# Patient Record
Sex: FEMALE | Race: WHITE | Employment: OTHER | URBAN - METROPOLITAN AREA
[De-identification: names, ages, dates, MRNs, and addresses within clinical notes are randomized per-mention and may not be internally consistent; named-entity substitution may affect disease eponyms.]

---

## 2017-01-06 ENCOUNTER — TELEPHONE (OUTPATIENT)
Dept: CASE MANAGEMENT | Age: 77
End: 2017-01-06

## 2017-01-06 NOTE — TELEPHONE ENCOUNTER
Heart Failure Care Coordinator contacted the patient by telephone to perform CHF bundle Follow Up. Spoke to Diaz Tidwell  regarding the following:  Have you been to an ER/Hospital since we last spoke: Denies    Mr. Beverly Almaraz stated Mrs. Beverly Almaraz was feeling under the weather and did not feel like participating in the call. Mr Beverly Almaraz stated they do have a PCP apt. Scheduled for Tuesday 1/10/17. Mr. Beverly Almaraz stated Mrs. Loera Citijocelynn weight is down from last week although he cannot remember the exact weight. CHF coordinator thanked Mr. Beverly Almaraz for his time and cut call short. Mr. Beverly Almaraz stated coordinator could call back next week after PCP apt. and follow up. Patient reminded that the physicians are on call 24 hours a day / 7 days a week should the patient have questions or concerns.  Patient reminded to call 911 if situation is emergent or patient feels the situation is emergent.     No Medications Reconciled at this time    Contact Information:    [address] [facility] [place] Coordinator  O [phone] [email or web address]

## 2017-03-07 ENCOUNTER — HOSPITAL ENCOUNTER (OUTPATIENT)
Dept: CT IMAGING | Age: 77
Discharge: HOME | End: 2017-03-07
Attending: SPECIALIST
Payer: MEDICARE

## 2017-03-07 DIAGNOSIS — H60.12 CELLULITIS OF LEFT EXTERNAL EAR: ICD-10-CM

## 2017-03-07 DIAGNOSIS — J38.01 PARALYSIS OF VOCAL CORDS AND LARYNX, UNILATERAL: ICD-10-CM

## 2017-03-07 DIAGNOSIS — H90.3 SENSORINEURAL HEARING LOSS, BILATERAL: ICD-10-CM

## 2017-03-07 PROCEDURE — 74011636320 HC RX REV CODE- 636/320: Performed by: SPECIALIST

## 2017-03-07 PROCEDURE — 74011250636 HC RX REV CODE- 250/636: Performed by: SPECIALIST

## 2017-03-07 PROCEDURE — 70491 CT SOFT TISSUE NECK W/DYE: CPT

## 2017-03-07 RX ORDER — SODIUM CHLORIDE 0.9 % (FLUSH) 0.9 %
10 SYRINGE (ML) INJECTION
Status: COMPLETED | OUTPATIENT
Start: 2017-03-07 | End: 2017-03-07

## 2017-03-07 RX ORDER — SODIUM CHLORIDE 9 MG/ML
50 INJECTION, SOLUTION INTRAVENOUS
Status: COMPLETED | OUTPATIENT
Start: 2017-03-07 | End: 2017-03-07

## 2017-03-07 RX ADMIN — IOPAMIDOL 100 ML: 612 INJECTION, SOLUTION INTRAVENOUS at 15:05

## 2017-03-07 RX ADMIN — Medication 10 ML: at 15:06

## 2017-03-07 RX ADMIN — SODIUM CHLORIDE 125 ML/HR: 900 INJECTION, SOLUTION INTRAVENOUS at 13:50

## 2017-10-02 ENCOUNTER — HOSPITAL ENCOUNTER (EMERGENCY)
Age: 77
Discharge: HOME | End: 2017-10-03
Attending: EMERGENCY MEDICINE
Payer: MEDICARE

## 2017-10-02 ENCOUNTER — APPOINTMENT (OUTPATIENT)
Dept: GENERAL RADIOLOGY | Age: 77
End: 2017-10-02
Attending: EMERGENCY MEDICINE
Payer: MEDICARE

## 2017-10-02 DIAGNOSIS — I50.33 ACUTE ON CHRONIC DIASTOLIC CONGESTIVE HEART FAILURE (HCC): Primary | ICD-10-CM

## 2017-10-02 DIAGNOSIS — Z91.14 NONCOMPLIANCE WITH MEDICATION REGIMEN: ICD-10-CM

## 2017-10-02 LAB
ALBUMIN SERPL-MCNC: 3.5 G/DL (ref 3.5–5)
ALBUMIN/GLOB SERPL: 0.9 {RATIO} (ref 1.1–2.2)
ALP SERPL-CCNC: 175 U/L (ref 45–117)
ALT SERPL-CCNC: 14 U/L (ref 12–78)
ANION GAP SERPL CALC-SCNC: 4 MMOL/L (ref 5–15)
AST SERPL-CCNC: 15 U/L (ref 15–37)
BASOPHILS # BLD: 0.1 K/UL (ref 0–0.1)
BASOPHILS NFR BLD: 1 % (ref 0–1)
BILIRUB SERPL-MCNC: 0.7 MG/DL (ref 0.2–1)
BNP SERPL-MCNC: 6180 PG/ML (ref 0–450)
BUN SERPL-MCNC: 16 MG/DL (ref 6–20)
BUN/CREAT SERPL: 12 (ref 12–20)
CALCIUM SERPL-MCNC: 8.8 MG/DL (ref 8.5–10.1)
CHLORIDE SERPL-SCNC: 103 MMOL/L (ref 97–108)
CK MB CFR SERPL CALC: 2.6 % (ref 0–2.5)
CK MB SERPL-MCNC: 1.2 NG/ML (ref 5–25)
CK SERPL-CCNC: 47 U/L (ref 26–192)
CO2 SERPL-SCNC: 33 MMOL/L (ref 21–32)
CREAT SERPL-MCNC: 1.36 MG/DL (ref 0.55–1.02)
EOSINOPHIL # BLD: 0.4 K/UL (ref 0–0.4)
EOSINOPHIL NFR BLD: 5 % (ref 0–7)
ERYTHROCYTE [DISTWIDTH] IN BLOOD BY AUTOMATED COUNT: 15.9 % (ref 11.5–14.5)
GLOBULIN SER CALC-MCNC: 4.1 G/DL (ref 2–4)
GLUCOSE SERPL-MCNC: 130 MG/DL (ref 65–100)
HCT VFR BLD AUTO: 42.9 % (ref 35–47)
HGB BLD-MCNC: 13 G/DL (ref 11.5–16)
INR PPP: 2.9 (ref 0.9–1.1)
LYMPHOCYTES # BLD: 1.3 K/UL (ref 0.8–3.5)
LYMPHOCYTES NFR BLD: 15 % (ref 12–49)
MCH RBC QN AUTO: 28.6 PG (ref 26–34)
MCHC RBC AUTO-ENTMCNC: 30.3 G/DL (ref 30–36.5)
MCV RBC AUTO: 94.3 FL (ref 80–99)
MONOCYTES # BLD: 0.8 K/UL (ref 0–1)
MONOCYTES NFR BLD: 10 % (ref 5–13)
NEUTS SEG # BLD: 5.8 K/UL (ref 1.8–8)
NEUTS SEG NFR BLD: 69 % (ref 32–75)
PLATELET # BLD AUTO: 188 K/UL (ref 150–400)
POTASSIUM SERPL-SCNC: 5.2 MMOL/L (ref 3.5–5.1)
PROT SERPL-MCNC: 7.6 G/DL (ref 6.4–8.2)
PROTHROMBIN TIME: 30 SEC (ref 9–11.1)
RBC # BLD AUTO: 4.55 M/UL (ref 3.8–5.2)
SODIUM SERPL-SCNC: 140 MMOL/L (ref 136–145)
TROPONIN I SERPL-MCNC: <0.04 NG/ML
WBC # BLD AUTO: 8.3 K/UL (ref 3.6–11)

## 2017-10-02 PROCEDURE — 93005 ELECTROCARDIOGRAM TRACING: CPT

## 2017-10-02 PROCEDURE — 99285 EMERGENCY DEPT VISIT HI MDM: CPT

## 2017-10-02 PROCEDURE — 71010 XR CHEST PORT: CPT

## 2017-10-02 PROCEDURE — 94640 AIRWAY INHALATION TREATMENT: CPT

## 2017-10-02 PROCEDURE — 74011250636 HC RX REV CODE- 250/636: Performed by: EMERGENCY MEDICINE

## 2017-10-02 PROCEDURE — 85610 PROTHROMBIN TIME: CPT | Performed by: EMERGENCY MEDICINE

## 2017-10-02 PROCEDURE — 84484 ASSAY OF TROPONIN QUANT: CPT | Performed by: EMERGENCY MEDICINE

## 2017-10-02 PROCEDURE — 36415 COLL VENOUS BLD VENIPUNCTURE: CPT | Performed by: EMERGENCY MEDICINE

## 2017-10-02 PROCEDURE — 96374 THER/PROPH/DIAG INJ IV PUSH: CPT

## 2017-10-02 PROCEDURE — 82550 ASSAY OF CK (CPK): CPT | Performed by: EMERGENCY MEDICINE

## 2017-10-02 PROCEDURE — 80053 COMPREHEN METABOLIC PANEL: CPT | Performed by: EMERGENCY MEDICINE

## 2017-10-02 PROCEDURE — 77030029684 HC NEB SM VOL KT MONA -A

## 2017-10-02 PROCEDURE — 74011000250 HC RX REV CODE- 250: Performed by: EMERGENCY MEDICINE

## 2017-10-02 PROCEDURE — 83880 ASSAY OF NATRIURETIC PEPTIDE: CPT | Performed by: EMERGENCY MEDICINE

## 2017-10-02 PROCEDURE — 85025 COMPLETE CBC W/AUTO DIFF WBC: CPT | Performed by: EMERGENCY MEDICINE

## 2017-10-02 RX ORDER — FUROSEMIDE 10 MG/ML
40 INJECTION INTRAMUSCULAR; INTRAVENOUS
Status: COMPLETED | OUTPATIENT
Start: 2017-10-02 | End: 2017-10-02

## 2017-10-02 RX ORDER — IPRATROPIUM BROMIDE AND ALBUTEROL SULFATE 2.5; .5 MG/3ML; MG/3ML
3 SOLUTION RESPIRATORY (INHALATION) ONCE
Status: COMPLETED | OUTPATIENT
Start: 2017-10-02 | End: 2017-10-02

## 2017-10-02 RX ADMIN — IPRATROPIUM BROMIDE AND ALBUTEROL SULFATE 3 ML: .5; 3 SOLUTION RESPIRATORY (INHALATION) at 20:15

## 2017-10-02 RX ADMIN — FUROSEMIDE 40 MG: 10 INJECTION, SOLUTION INTRAMUSCULAR; INTRAVENOUS at 21:15

## 2017-10-02 NOTE — ED TRIAGE NOTES
Pt has a history of CHF, a fib, HTN, and kidney disease. Pt reports that she slid out of the bed because she could not stand. Pt on the floor for an unknown length of time. Pt had wheezing per EMS from across the room. Pt is non-ambulatory. Pt can stand with assistance. Pt lives at home.  should be on the way. Pt has lymphademy in her legs. 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 Pt is a DNR, per EMS. Pt getting albuterol/atrovent breathing treatment. Pt does not have home oxygen.

## 2017-10-02 NOTE — ED NOTES
Attempted to help clean pt up. Some feces removed from pt's right side. Limited visibility to note any skin breakdown. Pt unable to roll very far.

## 2017-10-02 NOTE — ED NOTES
Per EMS, they tried to roll the patient over to get a sheet under her to help her up and they were unable to.

## 2017-10-03 VITALS
SYSTOLIC BLOOD PRESSURE: 110 MMHG | OXYGEN SATURATION: 98 % | HEART RATE: 109 BPM | DIASTOLIC BLOOD PRESSURE: 65 MMHG | HEIGHT: 70 IN | RESPIRATION RATE: 17 BRPM | WEIGHT: 293 LBS | BODY MASS INDEX: 41.95 KG/M2 | TEMPERATURE: 97.9 F

## 2017-10-03 LAB
ATRIAL RATE: 150 BPM
CALCULATED R AXIS, ECG10: -118 DEGREES
CALCULATED T AXIS, ECG11: 84 DEGREES
DIAGNOSIS, 93000: NORMAL
Q-T INTERVAL, ECG07: 348 MS
QRS DURATION, ECG06: 90 MS
QTC CALCULATION (BEZET), ECG08: 464 MS
VENTRICULAR RATE, ECG03: 107 BPM

## 2017-10-03 NOTE — ED NOTES
Bedside and Verbal shift change report given to JAROD/Elliot Curry 1106 (oncoming nurse) by Jaskaran Edge (offgoing nurse). Report included the following information SBAR, Kardex, ED Summary, [facility] and Recent Results. Assuming care of pt.

## 2017-10-03 NOTE — PROGRESS NOTES
CM Consult for discharge planning:    CM saw patient and her  in ED to discuss discharge needs and planning. 69 y.o. female with PMhx significant for CHF, CKD, arthritis, HTN, and lymphedema who presents via EMS to the ED with cc of gradually worsening SOB and a cough. Per pt, she presents to the ED today s/p a fall and evaluation for it alongside the aforementioned. Per pt, she was moving from her wheelchair to the \"potty chair\" when she slipped down onto the floor, urinating on, and defecating herself. Patient has had increased difficulty with ambulation and pivoting to [facility] over last couple of months and has become more and more immobile. Patient last saw PCP in August and cannot remember last appointment with cardiology or nephrology. She states she has not been taking her \"fluid pills\" because she has had so much trouble going to the bathroom. Patient was provided with options for increased personal care and Dr. Regan De La O has agreed with order for home health for OT/PT safety evaluation and nursing for congestive heart failure management. Patient has been offered [place] of choice and has chosen [facility]. Referral sent via [employer]. CM spoke to Dr. Nitza Krueger nurse (no nurse navigator in office) regarding patient's plan of care hand-off. Patient will need hospital bed and bariatric lift for assistance with home management. Patient's  provided with list of home health providers, SNF's, personal care private pay providers, [employer], caregiver websites for assistance with finding AL facility if needed. Patient and  will also plan to apply for Medicaid and/or review fiances for caregiver options. Palliative Care information provided for discussion with Dr. Regan De La O. Care Management Interventions  PCP Verified by CM: Yes (Dr. Lizz Lindsey)  Mode of Transport at Discharge:  Other (see comment) (Patient has motorized wheelchair, w/c van and ramp at home -  and patient  declined EMS stretcher transport)  Transition of Care Consult (CM Consult): Discharge Planning (List of resources provided to patient and her  - see notes)  Brot Signup: No  Discharge Durable Medical Equipment: No (Patient has motorized w/c, BSC, rollator and home ramp)  Physical Therapy Consult: No  Occupational Therapy Consult: No  Speech Therapy Consult: No  Current Support Network: Lives with Spouse, Own Home (One story home w/ 2 steps(ramp) to enter.   2 children live close by but may be unreliable w/ help)  Confirm Follow Up Transport: Wheelchair Joana Ban (Family has w/c Norma )  Plan discussed with Pt/Family/Caregiver: Yes (Patient and  discussed options)  Freedom of Choice Offered: Yes (Patient refused self-pay placement and requests to return home)  Discharge Location  Discharge Placement: Home with family assistance (Requested home health order from PCP - he will consider home health after office visit on Friday, 10/6.)    Curtis Almazan RN, BSN, ACM  ED Case Manager  [phone]

## 2017-10-03 NOTE — ED NOTES
Bedside shift change report given to Ignacia Alarcon (oncoming nurse) by John Salter (offgoing nurse). Report included the following information SBAR, ED Summary, MAR and Recent Results.

## 2017-10-03 NOTE — ED NOTES
Per  Sarina Ha RN pt became hypoxic while conversing and dropped to 77% on room air. Pt was placed on 2L NC and SpO2 came up to 92%. Pt taken off O2 again and remained at 89-90% while resting.

## 2017-10-03 NOTE — DISCHARGE INSTRUCTIONS
Heart Failure: Care Instructions  Your Care Instructions    Heart failure occurs when your heart does not pump as much blood as the body needs. Failure does not mean that the heart has stopped pumping but rather that it is not pumping as well as it should. Over time, this causes fluid buildup in your lungs and other parts of your body. Fluid buildup can cause shortness of breath, fatigue, swollen ankles, and other problems. By taking medicines regularly, reducing sodium (salt) in your diet, checking your weight every day, and making lifestyle changes, you can feel better and live longer. Follow-up care is a key part of your treatment and safety. Be sure to make and go to all appointments, and call your doctor if you are having problems. It's also a good idea to know your test results and keep a list of the medicines you take. How can you care for yourself at home? Medicines  · Be safe with medicines. Take your medicines exactly as prescribed. Call your doctor if you think you are having a problem with your medicine. · Do not take any vitamins, over-the-counter medicine, or herbal products without talking to your doctor first. Marie Meiers not take ibuprofen (Advil or Motrin) and naproxen (Aleve) without talking to your doctor first. They could make your heart failure worse. · You may be taking some of the following medicine. ¨ Beta-blockers can slow heart rate, decrease blood pressure, and improve your condition. Taking a beta-blocker may lower your chance of needing to be hospitalized. ¨ Angiotensin-converting enzyme inhibitors (ACEIs) reduce the heart's workload, lower blood pressure, and reduce swelling. Taking an ACEI may lower your chance of needing to be hospitalized again. ¨ Angiotensin II receptor blockers (ARBs) work like ACEIs. Your doctor may prescribe them instead of ACEIs. ¨ Diuretics, also called water pills, reduce swelling.   ¨ Potassium supplements replace this important mineral, which is sometimes lost with diuretics. ¨ Aspirin and other blood thinners prevent blood clots, which can cause a stroke or heart attack. You will get more details on the specific medicines your doctor prescribes. Diet  · Your doctor may suggest that you limit sodium to 2,000 milligrams (mg) a day or less. That is less than 1 teaspoon of salt a day, including all the salt you eat in cooking or in packaged foods. People get most of their sodium from processed foods. Fast food and restaurant meals also tend to be very high in sodium. · Ask your doctor how much liquid you can drink each day. You may have to limit liquids. Weight  · Weigh yourself without clothing at the same time each day. Record your weight. Call your doctor if you have a sudden weight gain, such as more than 2 to 3 pounds in a day or 5 pounds in a week. (Your doctor may suggest a different range of weight gain.) A sudden weight gain may mean that your heart failure is getting worse. Activity level  · Start light exercise (if your doctor says it is okay). Even if you can only do a small amount, exercise will help you get stronger, have more energy, and manage your weight and your stress. Walking is an easy way to get exercise. Start out by walking a little more than you did before. Bit by bit, increase the amount you walk. · When you exercise, watch for signs that your heart is working too hard. You are pushing yourself too hard if you cannot talk while you are exercising. If you become short of breath or dizzy or have chest pain, stop, sit down, and rest.  · If you feel \"wiped out\" the day after you exercise, walk slower or for a shorter distance until you can work up to a better pace. · Get enough rest at night. Sleeping with 1 or 2 pillows under your upper body and head may help you breathe easier. Lifestyle changes  · Do not smoke. Smoking can make a heart condition worse.  If you need help quitting, talk to your doctor about stop-smoking programs and medicines. These can increase your chances of quitting for good. Quitting smoking may be the most important step you can take to protect your heart. · Limit alcohol to 2 drinks a day for men and 1 drink a day for women. Too much alcohol can cause health problems. · Avoid getting sick from colds and the flu. Get a pneumococcal vaccine shot. If you have had one before, ask your doctor whether you need another dose. Get a flu shot each year. If you must be around people with colds or the flu, wash your hands often. When should you call for help? Call 911 if you have symptoms of sudden heart failure such as:  · You have severe trouble breathing. · You cough up pink, foamy mucus. · You have a new irregular or rapid heartbeat. Call your doctor now or seek immediate medical care if:  · You have new or increased shortness of breath. · You are dizzy or lightheaded, or you feel like you may faint. · You have sudden weight gain, such as more than 2 to 3 pounds in a day or 5 pounds in a week. (Your doctor may suggest a different range of weight gain.)  · You have increased swelling in your legs, ankles, or feet. · You are suddenly so tired or weak that you cannot do your usual activities. Watch closely for changes in your health, and be sure to contact your doctor if:  · You develop new symptoms. Where can you learn more? Go to http://ke-tawanna.info/. Enter W542 in the search box to learn more about \"Heart Failure: Care Instructions. \"  Current as of: April 3, 2017  Content Version: 11.3  © 4304-4066 [employer] Care instructions adapted under license by [employer] (which disclaims liability or warranty for this information). If you have questions about a medical condition or this instruction, always ask your healthcare professional. [address] any warranty or liability for your use of this information.

## 2017-10-03 NOTE — ED PROVIDER NOTES
[address]  EMERGENCY DEPARTMENT HISTORY AND PHYSICAL EXAM       Date of Service: 10/2/2017   Patient Name: Juan Soriano   YOB: 1940  Medical Record Number: [id number]    History of Presenting Illness     Chief Complaint   Patient presents with    Shortness of Breath      History Provided By:  patient    Additional History:   Juan Soriano is a 69 y.o. female with PMhx significant for CHF, CKD, arthritis, HTN, and lymphedema who presents via EMS to the ED with cc of gradually worsening SOB and a cough. Per pt, she presents to the ED today s/p a fall and evaluation for it alongside the aforementioned. Per pt, she was moving from her wheelchair to the \"potty chair\" when she slipped down onto the floor, urinating on, and defecating herself. Pt notes EMS arrived for assistance and they noted her SOB to be worse with active wheezing. Pt states her SOB has been present over the past three weeks with gradual worsening. Pt states they attempted to treat her SOB with her 's NC at home at 2L with mild relief. Pt notes an intermittent dry cough and wheezing with her SOB which have been increased from baseline. Of note, pt informs she lives a sedentary lifestyle and does not ambulate. Pt states she is supposed to be on 6 Lasix pills daily but has completely stopped taking them x three weeks secondary to having difficulty moving to urinate every several minutes. Pt informs she is on Coumadin. Pt specifically denies any nausea, vomiting, fevers, chills, abdominal pain, or chest pain. Social Hx: -  Tobacco, -  EtOH, -  Illicit Drugs    There are no other complaints, changes or physical findings at this time.     Primary Care Provider: Eva Jackson MD     Past History     Past Medical History:   Past Medical History:   Diagnosis Date    Arrhythmia     A Fib    Arthritis     Atrial fibrillation ([facility]     Cancer ([facility]     left breast cancer    Cardiomegaly     CKD (chronic kidney disease) stage 3, GFR 30-59 ml/min     judith    Clostridium difficile infection     Congestive [facility] failure, unspecified [facility]     COPD     Diarrhea     /constipation    Dyspepsia and other specified disorders of function of stomach     Elevated antinuclear antibody (MARYAM) level     Endocrine disease     hypothyroidism    GERD (gastroesophageal reflux disease)     Hearing loss     Heart failure (HCC)     Hypertension     Hypertensive heart disease with heart failure (HCC)     diastolic dysfunction    Joint pain     Leg swelling     Long term (current) use of anticoagulants     Lymphedema     Morbid obesity (HCC)     Obesity, unspecified     Other ill-defined conditions     lymph edema    Pleural effusion     s/p VATS    Shortness of breath     Thyroid disease     Unspecified sleep apnea     Does not use machine      Past Surgical History:   Past Surgical History:   Procedure Laterality Date    BREAST SURGERY PROCEDURE UNLISTED  13    LEFT BREAST MASTECTOMY SENTINEL NODE BIOPSY      HX BREAST BIOPSY      left    HX BREAST BIOPSY      fibroids removed right breast    HX  SECTION      times 2    HX KNEE ARTHROSCOPY      left knee     HX MODIFIED RADICAL MASTECTOMY  2013    BREAST MASTECTOMY MODIFIED RADICAL performed by Jaquelin Katz MD at [facility] MAIN OR    HX OTHER SURGICAL      pleural effusion    HX RHINOPLASTY      HX DOMI AND BSO      HX TONSILLECTOMY        Family History:   Family History   Problem Relation Age of Onset    Lung Disease Mother      Emphysema    Heart Disease Mother     Hypertension Mother     Cancer Father      Lung Cancer    Heart Disease Father     Hypertension Father     Cancer Paternal Aunt      colon      Social History:   Social History   Substance Use Topics    Smoking status: Never Smoker    Smokeless tobacco: Never Used    Alcohol use No      Allergies:    Allergies   Allergen Reactions    Latex Rash    Grass Pollen-[place], Standard Unknown (comments)    Sulfa (Sulfonamide Antibiotics) Unknown (comments)       Review of Systems   Review of Systems   Constitutional: Negative for chills, fatigue and fever. Eyes: Negative. Respiratory: Positive for cough, shortness of breath and wheezing. Cardiovascular: Negative for chest pain and leg swelling. Gastrointestinal: Negative for abdominal pain, blood in stool, constipation, diarrhea, nausea and vomiting. Endocrine: Negative. Genitourinary: Negative for difficulty urinating and dysuria. Musculoskeletal: Negative. Skin: Negative for rash. Allergic/Immunologic: Negative. Neurological: Negative for weakness and numbness. Hematological: Negative. Psychiatric/Behavioral: Negative. Physical Exam  Physical Exam   Constitutional: She is oriented to person, place, and time. She appears well-developed and well-nourished. No distress. Hoarse voice   HENT:   Head: Normocephalic and atraumatic. Mouth/Throat: Oropharynx is clear and moist.   Eyes: Conjunctivae and EOM are normal.   Neck: Neck supple. No JVD present. No tracheal deviation present. Cardiovascular: Intact distal pulses. An irregularly irregular rhythm present. Tachycardia present. Exam reveals no gallop and no friction rub. No murmur heard. Pulmonary/Chest: Effort normal. No stridor. No respiratory distress. She has wheezes (diffuse, BL, expiratory). Abdominal: Soft. Bowel sounds are normal. She exhibits no distension and no mass. There is no tenderness. There is no guarding. Musculoskeletal: Normal range of motion. She exhibits no tenderness. No deformity; 3+ pitting edema   Neurological: She is alert and oriented to person, place, and time. She has normal strength. No focal deficits   Skin: Skin is warm, dry and intact. No rash noted. Chronic, hardened skin changes to BLE   Psychiatric: She has a normal mood and affect.  Her behavior is normal. Judgment and thought content normal.   Nursing note and vitals reviewed. Medical Decision Making   I am the first provider for this patient. I reviewed the vital signs, available nursing notes, past medical history, past surgical history, family history and social history. Old Medical Records: Old medical records. Provider Notes:   Pt with a hx of CHF, has been noncompliant with her medications due to the difficulties of having to go to the bathroom. Pt with progressively worsening SOB, but patient has no increased O2 requirement at this time and is speaking in full sentences. Will check labs, pro-bnp, cardiac enzymes, ekg, CXR. DDx includes chf exacerbation, acs, arrhythmia, pneumonia, anemia. ED Course:  6:50 PM   Initial assessment performed. The patients presenting problems have been discussed, and they are in agreement with the care plan formulated and outlined with them. I have encouraged them to ask questions as they arise throughout their visit. Progress Notes:     Progress Note:   9:52 PM  Updated pt on returned results. In no respiratory distress; 97% O2 on RA. Written by CHYNA Hollins, as dictated by Tenzin Scott DO.     CONSULT NOTE:   10:38 PM  Tenzin Scott DO spoke with Dr. Forrest Lay,  Specialty: Hospitalist  Discussed pt's hx, disposition, and available diagnostic and imaging results. Reviewed care plans. Consultant agrees with plans as outlined. Not eligible for admission. Written by CHYNA Hollins, as dictated by Tenzin Scott DO. Progress Note:   11:59 PM  Updated pt on the further progression of care plan including case management in the morning. Pt expresses her understanding and agreement with items outline. Written by CHYNA Hollins, as dictated by Tenzin Scott DO. SIGN OUT:  12:04 AM  Patient's presentation, labs/imaging and plan of care was reviewed with Santa Dai MD as part of sign out.   They will follow up with case management in the AM as part of the plan discussed with the patient. Santa Dai MD's assistance in completion of this plan is greatly appreciated but it should be noted that I will be the provider of record for this patient. Patrick Rois DO    SIGN OUT:  7:44 AM  Patient's presentation, labs/imaging and plan of care was reviewed with Hiram Richard. Kaylee Thomas MD as part of sign out. They will dispo as part of the plan discussed with the patient. Hiram Richard. Kaylee Thomas MD's assistance in completion of this plan is greatly appreciated but it should be noted that I will be the provider of record for this patient. This note is prepared by Bharath Borden, acting as Scribe for Santa Cruz MD.    Krish Walker MD: The scribe's documentation has been prepared under my direction and personally reviewed by me in its entirety. I confirm that the note above accurately reflects all work, treatment, procedures, and medical decision making performed by me. Diagnostic Study Results     Radiologic Studies -  The following have been ordered and reviewed:  CXR Results  (Last 48 hours)               10/02/17 2036  XR CHEST PORT Final result    Impression:  IMPRESSION:       Mild pulmonary edema. No left pleural effusion. Left lung base atelectasis   versus edema versus pneumonia. Consider PA and lateral chest radiographs when   the patient can better tolerate. Narrative:  EXAM:  XR CHEST PORT       INDICATION:  Found down, wheezing. CHF. COMPARISON: Chest views on 12/2/2016. TECHNIQUE: Semiupright portable chest AP view       FINDINGS: Cardiac monitoring wires overlie the thorax. Cardiomegaly is   unchanged. Pulmonary vasculature is mildly prominent. Reticular interstitial opacities indicate interstitial pulmonary edema. Left   lung base opacity likely represents a combination of pleural effusion and   atelectasis versus edema versus pneumonia.         Surgical clips indicate left axillary dissection. Bones are osteopenic. Vital Signs-Reviewed the patient's vital signs. Patient Vitals for the past 12 hrs:   Pulse Resp BP SpO2   10/03/17 0612 (!) 109 17 - 98 %   10/03/17 0515 (!) 114 16 110/65 -   10/03/17 0500 (!) 113 17 123/58 -   10/03/17 0445 (!) 115 14 108/54 -     Medications Given in the ED:  Medications   albuterol-ipratropium (DUO-NEB) 2.5 MG-0.5 MG/3 ML (3 mL Nebulization Given 10/2/17 2015)   furosemide (LASIX) injection 40 mg (40 mg IntraVENous Given 10/2/17 2115)     Pulse Oximetry Analysis - Normal 100% on 2L     Cardiac Monitor:   Rate: 103  Rhythm: A-fib with RVR    EKG interpretation: (Preliminary) 1843  Rhythm: a-fib with RVR with PVC/PAV; and irregular. Rate (approx.): 107; Axis: right axis deviation; WA interval: none; QRS interval: normal ; ST/T wave: no ST changes;   Written by Marquis Vaughn ED Scribe, as dictated by Edilberto Degroot DO    Diagnosis   Clinical Impression:   1. Acute on chronic diastolic congestive heart failure ([address]    2. Noncompliance with medication regimen         Plan:  1:   Follow-up Information     Follow up With Details Comments [address], [place] Schedule an appointment as soon as possible for a visit  [address]  [id number]  [phone]      Your PCP Schedule an appointment as soon as possible for a visit      Derrick Velzi MD Go to [address] [place] [address]. [address] [place] [address]  [phone]          2:   Discharge Medication List as of 10/3/2017 11:22 AM      CONTINUE these medications which have NOT CHANGED    Details   levothyroxine (SYNTHROID) 112 mcg tablet Take 1 Tab by mouth Daily (before breakfast). , Print, Disp-30 Tab, R-0      metoprolol tartrate (LOPRESSOR) 50 mg tablet Take 1 Tab by mouth two (2) times a day., Print, Disp-60 Tab, R-0      allopurinol (ZYLOPRIM) 100 mg tablet Take 100 mg by mouth daily. , Historical Med      warfarin (COUMADIN) 5 mg tablet Take 5 mg by mouth nightly. m/tu/th/sat take 0.5 tablets for total of 2.5 mg, Historical Med      FUROSEMIDE PO Take 120 mg by mouth two (2) times a day., Historical Med      POTASSIUM CHLORIDE (KLOR-CON PO) Take 20 mEq by mouth two (2) times a day., Historical Med      FERROUS SULFATE, DRIED (IRON, DRIED, PO) Take 65 mg by mouth two (2) times a day., Historical Med      LACTOBACILLUS RHAMNOSUS GG (PROBIOTIC PO) Take  by mouth. 15 billion , Historical Med      venlafaxine (EFFEXOR) 75 mg tablet Take 75 mg by mouth two (2) times a day., Historical Med      loratadine (CLARITIN) 10 mg tablet Take 10 mg by mouth daily. , Historical Med      gabapentin (NEURONTIN) 400 mg capsule Take 400 mg by mouth nightly., Historical Med      pantoprazole (PROTONIX) 40 mg tablet Take 40 mg by mouth daily. , Historical Med           Return to ED if Worse    Disposition Note:  DISCHARGE NOTE:  11:35 AM  The patient is ready for discharge. The patients signs, symptoms, diagnosis, and instructions for discharge have been discussed and the pt has conveyed their understanding. The patient is to follow up as recommended or return to the ER should their symptoms worsen. Plan has been discussed and patient has conveyed their agreement.    _______________________________   Attestations: This note is prepared by Elkin Mcintosh, acting as Scribe for Liang Butler DO. Liang Butler DO: The scribe's documentation has been prepared under my direction and personally reviewed by me in its entirety.  I confirm that the note above accurately reflects all work, treatment, procedures, and medical decision making performed by me.  _______________________________

## 2017-10-06 ENCOUNTER — HOSPITAL ENCOUNTER (INPATIENT)
Age: 77
LOS: 10 days | Discharge: NURSING, REHAB OR LONG-TERM CARE | DRG: 291 | End: 2017-10-16
Attending: EMERGENCY MEDICINE | Admitting: INTERNAL MEDICINE
Payer: MEDICARE

## 2017-10-06 ENCOUNTER — APPOINTMENT (OUTPATIENT)
Dept: GENERAL RADIOLOGY | Age: 77
DRG: 291 | End: 2017-10-06
Attending: EMERGENCY MEDICINE
Payer: MEDICARE

## 2017-10-06 DIAGNOSIS — J90 PLEURAL EFFUSION: Primary | ICD-10-CM

## 2017-10-06 DIAGNOSIS — E87.70 HYPERVOLEMIA, UNSPECIFIED HYPERVOLEMIA TYPE: ICD-10-CM

## 2017-10-06 PROBLEM — I50.33 ACUTE ON CHRONIC DIASTOLIC HEART FAILURE (HCC): Status: ACTIVE | Noted: 2017-10-06

## 2017-10-06 LAB
ALBUMIN SERPL-MCNC: 3.4 G/DL (ref 3.5–5)
ALBUMIN/GLOB SERPL: 0.9 {RATIO} (ref 1.1–2.2)
ALP SERPL-CCNC: 135 U/L (ref 45–117)
ALT SERPL-CCNC: 15 U/L (ref 12–78)
ANION GAP SERPL CALC-SCNC: 9 MMOL/L (ref 5–15)
AST SERPL-CCNC: 17 U/L (ref 15–37)
BASOPHILS # BLD: 0 K/UL (ref 0–0.1)
BASOPHILS NFR BLD: 1 % (ref 0–1)
BILIRUB SERPL-MCNC: 1.2 MG/DL (ref 0.2–1)
BNP SERPL-MCNC: 6383 PG/ML (ref 0–450)
BUN SERPL-MCNC: 13 MG/DL (ref 6–20)
BUN/CREAT SERPL: 10 (ref 12–20)
CALCIUM SERPL-MCNC: 8.7 MG/DL (ref 8.5–10.1)
CHLORIDE SERPL-SCNC: 103 MMOL/L (ref 97–108)
CO2 SERPL-SCNC: 29 MMOL/L (ref 21–32)
CREAT SERPL-MCNC: 1.25 MG/DL (ref 0.55–1.02)
EOSINOPHIL # BLD: 0.5 K/UL (ref 0–0.4)
EOSINOPHIL NFR BLD: 7 % (ref 0–7)
ERYTHROCYTE [DISTWIDTH] IN BLOOD BY AUTOMATED COUNT: 15.8 % (ref 11.5–14.5)
GLOBULIN SER CALC-MCNC: 3.9 G/DL (ref 2–4)
GLUCOSE SERPL-MCNC: 106 MG/DL (ref 65–100)
HCT VFR BLD AUTO: 41.5 % (ref 35–47)
HGB BLD-MCNC: 13 G/DL (ref 11.5–16)
INR PPP: 2 (ref 0.9–1.1)
LYMPHOCYTES # BLD: 1.1 K/UL (ref 0.8–3.5)
LYMPHOCYTES NFR BLD: 14 % (ref 12–49)
MCH RBC QN AUTO: 28.8 PG (ref 26–34)
MCHC RBC AUTO-ENTMCNC: 31.3 G/DL (ref 30–36.5)
MCV RBC AUTO: 92 FL (ref 80–99)
MONOCYTES # BLD: 0.8 K/UL (ref 0–1)
MONOCYTES NFR BLD: 10 % (ref 5–13)
NEUTS SEG # BLD: 5.4 K/UL (ref 1.8–8)
NEUTS SEG NFR BLD: 68 % (ref 32–75)
PLATELET # BLD AUTO: 199 K/UL (ref 150–400)
POTASSIUM SERPL-SCNC: 4.3 MMOL/L (ref 3.5–5.1)
PROT SERPL-MCNC: 7.3 G/DL (ref 6.4–8.2)
PROTHROMBIN TIME: 20.8 SEC (ref 9–11.1)
RBC # BLD AUTO: 4.51 M/UL (ref 3.8–5.2)
SODIUM SERPL-SCNC: 141 MMOL/L (ref 136–145)
TROPONIN I SERPL-MCNC: <0.04 NG/ML
WBC # BLD AUTO: 7.9 K/UL (ref 3.6–11)

## 2017-10-06 PROCEDURE — 74011000250 HC RX REV CODE- 250: Performed by: EMERGENCY MEDICINE

## 2017-10-06 PROCEDURE — 36415 COLL VENOUS BLD VENIPUNCTURE: CPT | Performed by: EMERGENCY MEDICINE

## 2017-10-06 PROCEDURE — 84484 ASSAY OF TROPONIN QUANT: CPT | Performed by: EMERGENCY MEDICINE

## 2017-10-06 PROCEDURE — 96374 THER/PROPH/DIAG INJ IV PUSH: CPT

## 2017-10-06 PROCEDURE — 85610 PROTHROMBIN TIME: CPT | Performed by: EMERGENCY MEDICINE

## 2017-10-06 PROCEDURE — 80053 COMPREHEN METABOLIC PANEL: CPT | Performed by: EMERGENCY MEDICINE

## 2017-10-06 PROCEDURE — 85025 COMPLETE CBC W/AUTO DIFF WBC: CPT | Performed by: EMERGENCY MEDICINE

## 2017-10-06 PROCEDURE — 71010 XR CHEST PORT: CPT

## 2017-10-06 PROCEDURE — 93005 ELECTROCARDIOGRAM TRACING: CPT

## 2017-10-06 PROCEDURE — 83880 ASSAY OF NATRIURETIC PEPTIDE: CPT | Performed by: EMERGENCY MEDICINE

## 2017-10-06 PROCEDURE — 65660000000 HC RM CCU STEPDOWN

## 2017-10-06 PROCEDURE — 99284 EMERGENCY DEPT VISIT MOD MDM: CPT

## 2017-10-06 RX ORDER — WARFARIN 2.5 MG/1
2.5 TABLET ORAL
Status: ON HOLD | COMMUNITY
End: 2017-10-07

## 2017-10-06 RX ORDER — BUMETANIDE 0.25 MG/ML
1 INJECTION INTRAMUSCULAR; INTRAVENOUS
Status: COMPLETED | OUTPATIENT
Start: 2017-10-06 | End: 2017-10-06

## 2017-10-06 RX ORDER — WARFARIN SODIUM 5 MG/1
5 TABLET ORAL
Status: ON HOLD | COMMUNITY
End: 2017-10-07

## 2017-10-06 RX ORDER — ERGOCALCIFEROL 1.25 MG/1
50000 CAPSULE ORAL
COMMUNITY
End: 2019-11-15

## 2017-10-06 RX ORDER — GUAIFENESIN 600 MG/1
600 TABLET, EXTENDED RELEASE ORAL 2 TIMES DAILY
COMMUNITY

## 2017-10-06 RX ORDER — ACETAMINOPHEN 325 MG/1
650 TABLET ORAL
Status: DISCONTINUED | OUTPATIENT
Start: 2017-10-06 | End: 2017-10-16 | Stop reason: HOSPADM

## 2017-10-06 RX ADMIN — BUMETANIDE 1 MG: 0.25 INJECTION INTRAMUSCULAR; INTRAVENOUS at 21:56

## 2017-10-06 NOTE — IP AVS SNAPSHOT
[address] 
[phone] Patient: Lilibeth Gray MRN: [id number] FAX:0/28/7619 You are allergic to the following Allergen Reactions Latex Rash Grass Pollen-[place], Standard Unknown (comments) Sulfa (Sulfonamide Antibiotics) Unknown (comments) Recent Documentation Height Weight Breastfeeding? BMI OB Status Smoking Status 1.753 m (!) 166.6 kg No 54.24 kg/m2 Hysterectomy Never Smoker Emergency Contacts Name Discharge Info Relation Home Work Mobile Guanako Elizabeth DISCHARGE CAREGIVER [3] Spouse [3] [phone] About your hospitalization You were admitted on:  October 6, 2017 You last received care in the:  [facility] 4 IMCU 2 You were discharged on:  October 16, 2017 Unit phone number:  [phone] Why you were hospitalized Your primary diagnosis was:  Acute On Chronic Diastolic Heart Failure (Hcc) Providers Seen During Your Hospitalizations Provider Role Specialty Primary office phone Maile Olmedo DO Attending Provider Emergency Medicine [phone] Bill Brasher MD Attending Provider Internal Medicine [phone] Supriya Gonzales MD Attending Provider Hospitalist [phone] John Paul Foley MD Attending Provider Internal Medicine [phone] Kendra Magdaleno MD Attending Provider Internal Medicine [phone] Xi Wild MD Attending Provider Internal Medicine [phone] Your Primary Care Physician (PCP) Primary Care Physician Office Phone Office Fax Phyllis Tanner [phone] Follow-up Information Follow up With Details Comments Contact Info Chelle Garcia MD In 2 weeks Discharge follow up  [address] [place] [address] 
[phone] Cosme Lackey MD  week discharge follow up  [address] [facility] 
[address] [facility] 
[phone] [address] [address] [facility]   [address] [place] [address] [facility] 
[phone] Current Discharge Medication List  
  
START taking these medications Dose & Instructions Dispensing Information Comments Morning Noon Evening Bedtime  
 carvedilol 12.5 mg tablet Commonly known as:  Cole Banda Your last dose was: Your next dose is:    
   
   
 Dose:  12.5 mg Take 1 Tab by mouth two (2) times daily (with meals). Quantity:  60 Tab Refills:  1  
     
   
   
   
  
 isosorbide mononitrate ER 30 mg tablet Commonly known as:  IMDUR Your last dose was: Your next dose is:    
   
   
 Dose:  30 mg Take 1 Tab by mouth daily. Quantity:  30 Tab Refills:  1  
     
   
   
   
  
 nystatin powder Commonly known as:  MYCOSTATIN Your last dose was: Your next dose is:    
   
   
 Apply  to affected area three (3) times daily. APPLY TO bilateral groins and under both breasts  Indications: CUTANEOUS CANDIDIASIS Quantity:  1 Bottle Refills:  0  
     
   
   
   
  
 polyethylene glycol 17 gram packet Commonly known as:  Zonia Jackson Your last dose was: Your next dose is:    
   
   
 Dose:  17 g Take 1 Packet by mouth daily. Quantity:  30 Packet Refills:  1  
     
   
   
   
  
 spironolactone 25 mg tablet Commonly known as:  ALDACTONE Your last dose was: Your next dose is:    
   
   
 Dose:  25 mg Take 1 Tab by mouth daily. Quantity:  30 Tab Refills:  1 CONTINUE these medications which have CHANGED Dose & Instructions Dispensing Information Comments Morning Noon Evening Bedtime  
 furosemide 80 mg tablet Commonly known as:  LASIX What changed:   
- medication strength 
- how much to take - when to take this Your last dose was:     
   
Your next dose is:    
   
   
 Dose:  80 mg  
 Take 1 Tab by mouth two (2) times a day. Quantity:  60 Tab Refills:  1 CONTINUE these medications which have NOT CHANGED Dose & Instructions Dispensing Information Comments Morning Noon Evening Bedtime  
 allopurinol 100 mg tablet Commonly known as:  Krause Better Your last dose was: Your next dose is:    
   
   
 Dose:  100 mg Take 100 mg by mouth daily. Refills:  0 CLARITIN 10 mg tablet Generic drug:  loratadine Your last dose was: Your next dose is:    
   
   
 Dose:  10 mg Take 10 mg by mouth daily. Refills:  0  
     
   
   
   
  
 ergocalciferol 50,000 unit capsule Commonly known as:  ERGOCALCIFEROL Your last dose was: Your next dose is:    
   
   
 Dose:  51209 Units Take 50,000 Units by mouth. Refills:  0  
     
   
   
   
  
 gabapentin 400 mg capsule Commonly known as:  NEURONTIN Your last dose was: Your next dose is:    
   
   
 Dose:  400 mg Take 400 mg by mouth nightly. Refills:  0 IRON (DRIED) PO Your last dose was: Your next dose is:    
   
   
 Dose:  65 mg Take 65 mg by mouth two (2) times a day. Refills:  0 KLOR-CON PO Your last dose was: Your next dose is:    
   
   
 Dose:  20 mEq Take 20 mEq by mouth two (2) times a day. Refills:  0  
     
   
   
   
  
 levothyroxine 112 mcg tablet Commonly known as:  SYNTHROID Your last dose was: Your next dose is:    
   
   
 Dose:  112 mcg Take 1 Tab by mouth Daily (before breakfast). Quantity:  30 Tab Refills:  0 MUCINEX 600 mg ER tablet Generic drug:  guaiFENesin ER Your last dose was: Your next dose is:    
   
   
 Dose:  600 mg Take 600 mg by mouth two (2) times a day. Refills:  0 PROBIOTIC PO Your last dose was: Your next dose is: Take  by mouth. 15 billion Refills:  0 PROTONIX 40 mg tablet Generic drug:  pantoprazole Your last dose was: Your next dose is:    
   
   
 Dose:  40 mg Take 40 mg by mouth daily. Refills:  0  
     
   
   
   
  
 venlafaxine 75 mg tablet Commonly known as:  [facility] Your last dose was: Your next dose is:    
   
   
 Dose:  75 mg Take 75 mg by mouth two (2) times a day. Refills:  0  
     
   
   
   
  
 * warfarin 2.5 mg tablet Commonly known as:  COUMADIN Your last dose was: Your next dose is:    
   
   
 Dose:  2.5 mg Take 2.5 mg by mouth four (4) days a week. (2.5 mg on Ank-Ddu-Dotzi-Sat; 5 mg on Tues-Fri-Sun) Refills:  0  
     
   
   
   
  
 * warfarin 5 mg tablet Commonly known as:  COUMADIN Your last dose was: Your next dose is:    
   
   
 Dose:  5 mg Take 5 mg by mouth three (3) days a week. (2.5 mg on Mon-Wed-Thurs-Sat; 5 mg on Tues-Fri-Sun) Refills:  0  
     
   
   
   
  
 * Notice: This list has 2 medication(s) that are the same as other medications prescribed for you. Read the directions carefully, and ask your doctor or other care provider to review them with you. STOP taking these medications   
 metoprolol tartrate 50 mg tablet Commonly known as:  LOPRESSOR  
   
  
  
ASK your doctor about these medications Dose & Instructions Dispensing Information Comments Morning Noon Evening Bedtime  
 docusate sodium 50 mg capsule Commonly known as:  Lino Lucero Your last dose was: Your next dose is:    
   
   
 Dose:  50 mg Take 50 mg by mouth two (2) times daily as needed for Constipation. Refills:  0 Where to Get Your Medications Information on where to get these meds will be given to you by the nurse or doctor. ! Ask your nurse or doctor about these medications carvedilol 12.5 mg tablet  
 furosemide 80 mg tablet  
 isosorbide mononitrate ER 30 mg tablet  
 nystatin powder  
 polyethylene glycol 17 gram packet  
 spironolactone 25 mg tablet Discharge Instructions Discharging provider: Babita aMriano MD 
 
Primary care provider: Shalini Richard MD 
 
 
 
 
FINAL [address] [facility] COURSE: 
 
78-year-old woman with a past medical history significant for bilateral lower extremity edema, atrialfibrillation, obstructive sleep apnea, COPD, hypothyroidism, hypertension, depression, chronic kidney disease stage 3, morbid obesity, admitted for acute on chronic diastolic HF. Acute on chronic diastolic CHF NYHA IV on admission NYHA II at discharge - bumex 1mg IV bid changed to Lasix 80mg BID at discharge - c/w coreg, imdur and Aldactone - EF 45 %. There was possible moderate hypokinesis of the apical wall(s). - card's following 
- 10/13 d/c  Metolazone 
  
Left mod pleural effusion - improved s/p thoracentesis 
- 10/9 Cxr: with improvement 
- 10/11CXR 10/11: no improvement 
- 10/11 Vit K 2.5mg X 1 now, check INR in 6hr 
- 10/12 US guided thoracentesis, Cx Negative for 4 days 
   
A.fib 
- on coumadin and coreg 
- 10/12 restart coumdain, INR 1.7 at discharge, check daily INR until 2-3  
  
Sleep apnea 
- pt intolerant of BiPAP or CPAP 
   
Morbid obesity 
- encouraged lifestyle modification 
   
CKD-3 
- baseline Cr 1.7 
- 10/12 1.59 
- 10/13 1.70 
- 10/15  1.83   
-nephrology following 
-will have to accept higher creatinine to achieve better volume status - f/u with  at outpt 
  
Hypokalemia and hypo magnesemia: corrected 
  
Wound care for LE lymphedema with small ulcer. Wound care  
   
COPD: nebs and home meds. HTN coreg, lasix, aldactone FOLLOW-UP CARE RECOMMENDATIONS: 
 
APPOINTMENTS: 
Follow-up Information Follow up With Details Comments Contact Info Rochelle Stafford MD In 2 weeks Discharge follow up  [address] [place] [address] [phone] Paul Perez MD  week discharge follow up  [address] [facility]  
[address] [facility] 83. [phone] It is very important that you keep follow-up appointment(s). Bring discharge papers, medication list (and/or medication bottles) to follow-up appointments for review by outpatient provider(s). FOLLOW-UP TESTS RECOMMENDED:  
 
ONGOING TREATMENT PLAN:  
 
- DAILY INR UNTIL INR 2-3 
- REPEAT CHEM 8 IN 3 DAYS, SEND RESULTS TO DR.TRUDY FLYNN'S OFFICE PENDING TEST RESULTS: 
At the time of discharge the following test results are still pending: NONE Please review these results as they become available. Specific symptoms to watch for: chest pain, shortness of breath, fever, chills, nausea, vomiting, diarrhea, change in mentation, falling, weakness, bleeding. DIET:  Cardiac Diet ACTIVITY:  Activity as tolerated and PT/OT Eval and Treat WOUND CARE:  
Left lower leg wound- Every other day on ODD days, cleanse with normal saline, wipe wound bed clean and dry, apply a piece of Xeroform gauze that has been folded in half, cover with foam dressing. Moisturize leg with Aloe Vesta after foam dressing applied. 
  
Left posterior thigh wound- Every 12 hours cleanse with soap and water, blot dry, apply a thick coat of Sensicare Protective Barrier with zinc oxide. 
  
Aloe Vesta to bilateral lower legs every 12 hours 
  
Specialty bed: Compella ordered via [facility]. Use only flat sheet and one incontinence pad. Please call Chani Benson if not delivered. EQUIPMENT needed:  NONE INCIDENTAL FINDINGS:  NONE 
 
GOALS OF CARE: 
X  Eventual return to home/independent/assisted living Long term SNF Hospice No rehospitalization Patient condition at discharge:  
Functional status Poor X  Deconditioned Independent Cognition X  Lucid Forgetful (some sensescence) Dementia Catheters/lines (plus indication) Godinez PICC   
  PEG Code status X  Full code DNR Chloe Calderon . . . . . . . . . . . . . . . . . . . . . . . . . . . . . . . . . . . . . . . . . . . . . . . . . . . . . . . . . . . . . . . . . . . . . . Chloe Calderon CHRONIC MEDICAL CONDITIONS: 
Problem List as of 10/16/2017  Date Reviewed: 10/7/2017 Codes Class Noted - Resolved * (Principal)Acute on chronic diastolic heart failure (HCC) ICD-10-CM: I50.33 ICD-9-CM: 428.33  10/6/2017 - Present A-fib [facility]) ICD-10-CM: I48.91 
ICD-9-CM: 427.31  12/2/2016 - Present Physical deconditioning ICD-10-CM: R53.81 ICD-9-CM: 799.3  3/19/2014 - Present Breast CA ([facility] ICD-10-CM: C50.919 ICD-9-CM: 174.9  7/15/2013 - Present Recurrent epistaxis ICD-10-CM: R04.0 ICD-9-CM: 784.7  10/1/2012 - Present Diastolic CHF, chronic (HCC) ICD-10-CM: I50.32 
ICD-9-CM: 428.32, 428.0  10/1/2012 - Present Atrial fibrillation [facility]) ICD-10-CM: I48.91 
ICD-9-CM: 427.31  10/1/2012 - Present CKD (chronic kidney disease) stage 3, GFR 30-59 ml/min ICD-10-CM: N18.3 ICD-9-CM: 585.3  10/1/2012 - Present Hypovolemia ICD-10-CM: E86.1 ICD-9-CM: 276.52  10/1/2012 - Present Hypokalemia ICD-10-CM: E87.6 ICD-9-CM: 276.8  10/1/2012 - Present History of complete heart block ICD-10-CM: Z86.79 
ICD-9-CM: V12.59  10/1/2012 - Present History of Clostridium difficile infection ICD-10-CM: Z86.19 ICD-9-CM: V12.09  10/1/2012 - Present Diastolic CHF, acute on chronic (HCC) ICD-10-CM: I50.33 ICD-9-CM: 428.33, 428.0  5/4/2012 - Present Hypokalemia ICD-10-CM: E87.6 ICD-9-CM: 276.8  5/4/2012 - Present CKD (chronic kidney disease) stage 3, GFR 30-59 ml/min (Chronic) ICD-10-CM: N18.3 ICD-9-CM: 585.3  5/4/2012 - Present Sepsis(995.91) ICD-10-CM: A41.9 ICD-9-CM: 995.91  5/3/2012 - Present Intestinal infection due to Clostridium difficile ICD-10-CM: A04.72 
ICD-9-CM: 008.45  5/3/2012 - Present Complete heart block (HCC) ICD-10-CM: I44.2 ICD-9-CM: 426.0  4/5/2012 - Present ARF (acute renal failure) ([facility]) ICD-10-CM: N17.9 ICD-9-CM: 584.9  3/25/2012 - Present Diarrhea ICD-10-CM: R19.7 ICD-9-CM: 787.91  3/25/2012 - Present Anemia ICD-10-CM: D64.9 ICD-9-CM: 285.9  3/19/2012 - Present Acute on chronic renal failure (HCC) ICD-10-CM: N17.9, N18.9 ICD-9-CM: 584.9, 585.9  3/16/2012 - Present Unspecified hypothyroidism ICD-10-CM: E03.9 ICD-9-CM: 244.9  3/16/2012 - Present Chronic airway obstruction, not elsewhere classified ICD-10-CM: J44.9 ICD-9-CM: 852  3/16/2012 - Present Hy[facility] ICD-10-CM: I11.0 ICD-9-CM: 402.91, 428.9  Unknown - Present Overview Signed 1/1/2011  7:37 PM by Alexandre Huber MD  
  diastolic dysfunction Pleural effusion ICD-10-CM: J90 ICD-9-CM: 511.9  Unknown - Present Overview Signed 1/1/2011  7:37 PM by Alexandre Huber MD  
  s/p VATS Elevated antinuclear antibody (MARYAM) level ICD-10-CM: R76.8 ICD-9-CM: 795.79  Unknown - Present Other dyspnea and respiratory abnormality ICD-10-CM: R06.09, R09.89 ICD-9-CM: 786.09  Unknown - Present Other lymphedema ICD-10-CM: I89.0 ICD-9-CM: 457.1  Unknown - Present Morbid obesity with BMI of 50.0-59.9, adult (HCC) ICD-10-CM: E66.01, Z68.43 
ICD-9-CM: 278.01, V85.43  Unknown - Present Lymphedema (Chronic) ICD-10-CM: I89.0 ICD-9-CM: 457.1  Unknown - Present Atrial fibrillation (HCC) (Chronic) ICD-10-CM: I48.91 
ICD-9-CM: 427.31  Unknown - Present Discharge Orders None [facility] Announcement We are excited to announce that we are making your provider's discharge notes available to you in [employer].   You will see these notes when they are completed and signed by the physician that discharged you from your recent hospital stay. If you have any questions or concerns about any information you see in [employer], please call the Health Information Department where you were seen or reach out to your Primary Care Provider for more information about your plan of care. Introducing [facility]! Dear Halle Hood: Thank you for requesting a [employer] account. Our records indicate that you already have an active [employer] account. You can access your account anytime at [email or web address] [email or web address] Did you know that you can access your hospital and ER discharge instructions at any time in [employer]? You can also review all of your test results from your hospital stay or ER visit. Additional Information If you have questions, please visit the Frequently Asked Questions section of the [employer] website at [email or web address] Remember, [employer] is NOT to be used for urgent needs. For medical emergencies, dial 911. Now available from your iPhone and Android! General Information Please provide this summary of care documentation to your next provider. Patient Signature:  ____________________________________________________________ Date:  ____________________________________________________________  
  
Fer Bush Provider Signature:  ____________________________________________________________ Date:  ____________________________________________________________

## 2017-10-07 ENCOUNTER — APPOINTMENT (OUTPATIENT)
Dept: CT IMAGING | Age: 77
DRG: 291 | End: 2017-10-07
Attending: INTERNAL MEDICINE
Payer: MEDICARE

## 2017-10-07 LAB
ALBUMIN SERPL-MCNC: 3.3 G/DL (ref 3.5–5)
ALBUMIN/GLOB SERPL: 0.9 {RATIO} (ref 1.1–2.2)
ALP SERPL-CCNC: 130 U/L (ref 45–117)
ALT SERPL-CCNC: 13 U/L (ref 12–78)
ANION GAP SERPL CALC-SCNC: 9 MMOL/L (ref 5–15)
AST SERPL-CCNC: 17 U/L (ref 15–37)
BASOPHILS # BLD: 0 K/UL (ref 0–0.1)
BASOPHILS NFR BLD: 0 % (ref 0–1)
BILIRUB SERPL-MCNC: 1.6 MG/DL (ref 0.2–1)
BUN SERPL-MCNC: 12 MG/DL (ref 6–20)
BUN/CREAT SERPL: 9 (ref 12–20)
CALCIUM SERPL-MCNC: 8.8 MG/DL (ref 8.5–10.1)
CHLORIDE SERPL-SCNC: 102 MMOL/L (ref 97–108)
CK MB CFR SERPL CALC: 2.6 % (ref 0–2.5)
CK MB SERPL-MCNC: 1.5 NG/ML (ref 5–25)
CK SERPL-CCNC: 58 U/L (ref 26–192)
CO2 SERPL-SCNC: 29 MMOL/L (ref 21–32)
CREAT SERPL-MCNC: 1.29 MG/DL (ref 0.55–1.02)
EOSINOPHIL # BLD: 0.5 K/UL (ref 0–0.4)
EOSINOPHIL NFR BLD: 6 % (ref 0–7)
ERYTHROCYTE [DISTWIDTH] IN BLOOD BY AUTOMATED COUNT: 15.9 % (ref 11.5–14.5)
GLOBULIN SER CALC-MCNC: 3.6 G/DL (ref 2–4)
GLUCOSE BLD STRIP.AUTO-MCNC: 92 MG/DL (ref 65–100)
GLUCOSE BLD STRIP.AUTO-MCNC: 98 MG/DL (ref 65–100)
GLUCOSE SERPL-MCNC: 90 MG/DL (ref 65–100)
HCT VFR BLD AUTO: 40.4 % (ref 35–47)
HGB BLD-MCNC: 12.4 G/DL (ref 11.5–16)
LYMPHOCYTES # BLD: 1.2 K/UL (ref 0.8–3.5)
LYMPHOCYTES NFR BLD: 15 % (ref 12–49)
MAGNESIUM SERPL-MCNC: 1.8 MG/DL (ref 1.6–2.4)
MCH RBC QN AUTO: 28.7 PG (ref 26–34)
MCHC RBC AUTO-ENTMCNC: 30.7 G/DL (ref 30–36.5)
MCV RBC AUTO: 93.5 FL (ref 80–99)
MONOCYTES # BLD: 0.9 K/UL (ref 0–1)
MONOCYTES NFR BLD: 11 % (ref 5–13)
NEUTS SEG # BLD: 5.4 K/UL (ref 1.8–8)
NEUTS SEG NFR BLD: 68 % (ref 32–75)
PHOSPHATE SERPL-MCNC: 3 MG/DL (ref 2.6–4.7)
PLATELET # BLD AUTO: 192 K/UL (ref 150–400)
POTASSIUM SERPL-SCNC: 3.8 MMOL/L (ref 3.5–5.1)
PROT SERPL-MCNC: 6.9 G/DL (ref 6.4–8.2)
RBC # BLD AUTO: 4.32 M/UL (ref 3.8–5.2)
SERVICE CMNT-IMP: NORMAL
SERVICE CMNT-IMP: NORMAL
SODIUM SERPL-SCNC: 140 MMOL/L (ref 136–145)
TROPONIN I SERPL-MCNC: <0.04 NG/ML
TSH SERPL DL<=0.05 MIU/L-ACNC: 5.4 UIU/ML (ref 0.36–3.74)
WBC # BLD AUTO: 8 K/UL (ref 3.6–11)

## 2017-10-07 PROCEDURE — 84100 ASSAY OF PHOSPHORUS: CPT | Performed by: INTERNAL MEDICINE

## 2017-10-07 PROCEDURE — 74011000250 HC RX REV CODE- 250: Performed by: INTERNAL MEDICINE

## 2017-10-07 PROCEDURE — 97161 PT EVAL LOW COMPLEX 20 MIN: CPT

## 2017-10-07 PROCEDURE — 36415 COLL VENOUS BLD VENIPUNCTURE: CPT | Performed by: INTERNAL MEDICINE

## 2017-10-07 PROCEDURE — 83735 ASSAY OF MAGNESIUM: CPT | Performed by: INTERNAL MEDICINE

## 2017-10-07 PROCEDURE — 84484 ASSAY OF TROPONIN QUANT: CPT | Performed by: INTERNAL MEDICINE

## 2017-10-07 PROCEDURE — 84443 ASSAY THYROID STIM HORMONE: CPT | Performed by: INTERNAL MEDICINE

## 2017-10-07 PROCEDURE — 94640 AIRWAY INHALATION TREATMENT: CPT

## 2017-10-07 PROCEDURE — 65660000000 HC RM CCU STEPDOWN

## 2017-10-07 PROCEDURE — 74011250637 HC RX REV CODE- 250/637: Performed by: INTERNAL MEDICINE

## 2017-10-07 PROCEDURE — 82962 GLUCOSE BLOOD TEST: CPT

## 2017-10-07 PROCEDURE — 80053 COMPREHEN METABOLIC PANEL: CPT | Performed by: INTERNAL MEDICINE

## 2017-10-07 PROCEDURE — 82550 ASSAY OF CK (CPK): CPT | Performed by: INTERNAL MEDICINE

## 2017-10-07 PROCEDURE — 74011250636 HC RX REV CODE- 250/636: Performed by: INTERNAL MEDICINE

## 2017-10-07 PROCEDURE — 85025 COMPLETE CBC W/AUTO DIFF WBC: CPT | Performed by: INTERNAL MEDICINE

## 2017-10-07 PROCEDURE — 93306 TTE W/DOPPLER COMPLETE: CPT | Performed by: INTERNAL MEDICINE

## 2017-10-07 PROCEDURE — 97530 THERAPEUTIC ACTIVITIES: CPT

## 2017-10-07 RX ORDER — ONDANSETRON 2 MG/ML
4 INJECTION INTRAMUSCULAR; INTRAVENOUS
Status: DISCONTINUED | OUTPATIENT
Start: 2017-10-07 | End: 2017-10-16 | Stop reason: HOSPADM

## 2017-10-07 RX ORDER — METOLAZONE 5 MG/1
5 TABLET ORAL ONCE
Status: COMPLETED | OUTPATIENT
Start: 2017-10-07 | End: 2017-10-07

## 2017-10-07 RX ORDER — CARVEDILOL 12.5 MG/1
12.5 TABLET ORAL 2 TIMES DAILY WITH MEALS
Status: DISCONTINUED | OUTPATIENT
Start: 2017-10-07 | End: 2017-10-09

## 2017-10-07 RX ORDER — PANTOPRAZOLE SODIUM 40 MG/1
40 TABLET, DELAYED RELEASE ORAL DAILY
Status: DISCONTINUED | OUTPATIENT
Start: 2017-10-07 | End: 2017-10-16 | Stop reason: HOSPADM

## 2017-10-07 RX ORDER — ALLOPURINOL 100 MG/1
100 TABLET ORAL DAILY
Status: DISCONTINUED | OUTPATIENT
Start: 2017-10-07 | End: 2017-10-16 | Stop reason: HOSPADM

## 2017-10-07 RX ORDER — METOPROLOL TARTRATE 50 MG/1
50 TABLET ORAL 2 TIMES DAILY
Status: DISCONTINUED | OUTPATIENT
Start: 2017-10-07 | End: 2017-10-07

## 2017-10-07 RX ORDER — WARFARIN 7.5 MG/1
7.5 TABLET ORAL ONCE
Status: COMPLETED | OUTPATIENT
Start: 2017-10-07 | End: 2017-10-07

## 2017-10-07 RX ORDER — VENLAFAXINE 37.5 MG/1
75 TABLET ORAL 2 TIMES DAILY
Status: DISCONTINUED | OUTPATIENT
Start: 2017-10-07 | End: 2017-10-16 | Stop reason: HOSPADM

## 2017-10-07 RX ORDER — SODIUM CHLORIDE 0.9 % (FLUSH) 0.9 %
5-10 SYRINGE (ML) INJECTION EVERY 8 HOURS
Status: DISCONTINUED | OUTPATIENT
Start: 2017-10-07 | End: 2017-10-16 | Stop reason: HOSPADM

## 2017-10-07 RX ORDER — DOCUSATE SODIUM 50 MG/5ML
50 LIQUID ORAL
Status: DISCONTINUED | OUTPATIENT
Start: 2017-10-07 | End: 2017-10-16 | Stop reason: HOSPADM

## 2017-10-07 RX ORDER — LEVOTHYROXINE SODIUM 112 UG/1
112 TABLET ORAL
Status: DISCONTINUED | OUTPATIENT
Start: 2017-10-07 | End: 2017-10-16 | Stop reason: HOSPADM

## 2017-10-07 RX ORDER — BUMETANIDE 0.25 MG/ML
2 INJECTION INTRAMUSCULAR; INTRAVENOUS 2 TIMES DAILY
Status: DISCONTINUED | OUTPATIENT
Start: 2017-10-07 | End: 2017-10-11

## 2017-10-07 RX ORDER — FUROSEMIDE 10 MG/ML
80 INJECTION INTRAMUSCULAR; INTRAVENOUS EVERY 12 HOURS
Status: DISCONTINUED | OUTPATIENT
Start: 2017-10-07 | End: 2017-10-07

## 2017-10-07 RX ORDER — GUAIFENESIN 600 MG/1
600 TABLET, EXTENDED RELEASE ORAL 2 TIMES DAILY
Status: DISCONTINUED | OUTPATIENT
Start: 2017-10-07 | End: 2017-10-16 | Stop reason: HOSPADM

## 2017-10-07 RX ORDER — WARFARIN 2.5 MG/1
2.5 TABLET ORAL
COMMUNITY

## 2017-10-07 RX ORDER — GABAPENTIN 400 MG/1
400 CAPSULE ORAL
Status: DISCONTINUED | OUTPATIENT
Start: 2017-10-07 | End: 2017-10-16 | Stop reason: HOSPADM

## 2017-10-07 RX ORDER — NYSTATIN 100000 [USP'U]/G
POWDER TOPICAL 3 TIMES DAILY
Status: DISCONTINUED | OUTPATIENT
Start: 2017-10-07 | End: 2017-10-16 | Stop reason: HOSPADM

## 2017-10-07 RX ORDER — IPRATROPIUM BROMIDE AND ALBUTEROL SULFATE 2.5; .5 MG/3ML; MG/3ML
3 SOLUTION RESPIRATORY (INHALATION)
Status: DISCONTINUED | OUTPATIENT
Start: 2017-10-07 | End: 2017-10-16 | Stop reason: HOSPADM

## 2017-10-07 RX ORDER — SODIUM CHLORIDE 0.9 % (FLUSH) 0.9 %
5-10 SYRINGE (ML) INJECTION AS NEEDED
Status: DISCONTINUED | OUTPATIENT
Start: 2017-10-07 | End: 2017-10-16 | Stop reason: HOSPADM

## 2017-10-07 RX ORDER — POTASSIUM CHLORIDE 750 MG/1
20 TABLET, FILM COATED, EXTENDED RELEASE ORAL 2 TIMES DAILY
Status: DISCONTINUED | OUTPATIENT
Start: 2017-10-07 | End: 2017-10-09

## 2017-10-07 RX ORDER — LORATADINE 10 MG/1
10 TABLET ORAL DAILY
Status: DISCONTINUED | OUTPATIENT
Start: 2017-10-07 | End: 2017-10-16 | Stop reason: HOSPADM

## 2017-10-07 RX ORDER — IPRATROPIUM BROMIDE AND ALBUTEROL SULFATE 2.5; .5 MG/3ML; MG/3ML
3 SOLUTION RESPIRATORY (INHALATION)
Status: COMPLETED | OUTPATIENT
Start: 2017-10-07 | End: 2017-10-07

## 2017-10-07 RX ORDER — LOSARTAN POTASSIUM 25 MG/1
12.5 TABLET ORAL DAILY
Status: DISCONTINUED | OUTPATIENT
Start: 2017-10-07 | End: 2017-10-09

## 2017-10-07 RX ORDER — WARFARIN SODIUM 5 MG/1
5 TABLET ORAL
COMMUNITY

## 2017-10-07 RX ADMIN — POTASSIUM CHLORIDE 20 MEQ: 750 TABLET, FILM COATED, EXTENDED RELEASE ORAL at 09:51

## 2017-10-07 RX ADMIN — LOSARTAN POTASSIUM 12.5 MG: 25 TABLET ORAL at 12:31

## 2017-10-07 RX ADMIN — NITROGLYCERIN 0.5 INCH: 20 OINTMENT TOPICAL at 17:18

## 2017-10-07 RX ADMIN — WARFARIN SODIUM 7.5 MG: 7.5 TABLET ORAL at 17:17

## 2017-10-07 RX ADMIN — POTASSIUM CHLORIDE 20 MEQ: 750 TABLET, FILM COATED, EXTENDED RELEASE ORAL at 17:16

## 2017-10-07 RX ADMIN — GABAPENTIN 400 MG: 400 CAPSULE ORAL at 06:11

## 2017-10-07 RX ADMIN — NYSTATIN: 100000 POWDER TOPICAL at 03:08

## 2017-10-07 RX ADMIN — BUMETANIDE 2 MG: 0.25 INJECTION INTRAMUSCULAR; INTRAVENOUS at 17:20

## 2017-10-07 RX ADMIN — NYSTATIN: 100000 POWDER TOPICAL at 21:04

## 2017-10-07 RX ADMIN — IPRATROPIUM BROMIDE AND ALBUTEROL SULFATE 3 ML: .5; 3 SOLUTION RESPIRATORY (INHALATION) at 20:34

## 2017-10-07 RX ADMIN — GUAIFENESIN 600 MG: 600 TABLET, EXTENDED RELEASE ORAL at 09:51

## 2017-10-07 RX ADMIN — LEVOTHYROXINE SODIUM 112 MCG: 112 TABLET ORAL at 06:11

## 2017-10-07 RX ADMIN — Medication 10 ML: at 21:02

## 2017-10-07 RX ADMIN — VENLAFAXINE 75 MG: 37.5 TABLET ORAL at 09:50

## 2017-10-07 RX ADMIN — GUAIFENESIN 600 MG: 600 TABLET, EXTENDED RELEASE ORAL at 21:02

## 2017-10-07 RX ADMIN — CARVEDILOL 12.5 MG: 12.5 TABLET, FILM COATED ORAL at 17:16

## 2017-10-07 RX ADMIN — Medication 10 ML: at 09:50

## 2017-10-07 RX ADMIN — NYSTATIN: 100000 POWDER TOPICAL at 15:37

## 2017-10-07 RX ADMIN — Medication 10 ML: at 15:37

## 2017-10-07 RX ADMIN — METOPROLOL TARTRATE 50 MG: 50 TABLET ORAL at 09:51

## 2017-10-07 RX ADMIN — VENLAFAXINE 75 MG: 37.5 TABLET ORAL at 17:17

## 2017-10-07 RX ADMIN — Medication 10 ML: at 17:20

## 2017-10-07 RX ADMIN — LORATADINE 10 MG: 10 TABLET ORAL at 09:50

## 2017-10-07 RX ADMIN — NITROGLYCERIN 0.5 INCH: 20 OINTMENT TOPICAL at 23:03

## 2017-10-07 RX ADMIN — FUROSEMIDE 80 MG: 10 INJECTION, SOLUTION INTRAMUSCULAR; INTRAVENOUS at 09:50

## 2017-10-07 RX ADMIN — METOLAZONE 5 MG: 5 TABLET ORAL at 12:32

## 2017-10-07 RX ADMIN — ALLOPURINOL 100 MG: 100 TABLET ORAL at 09:51

## 2017-10-07 RX ADMIN — PANTOPRAZOLE SODIUM 40 MG: 40 TABLET, DELAYED RELEASE ORAL at 09:51

## 2017-10-07 RX ADMIN — NYSTATIN: 100000 POWDER TOPICAL at 09:51

## 2017-10-07 RX ADMIN — Medication 1 CAPSULE: at 09:50

## 2017-10-07 RX ADMIN — NITROGLYCERIN 0.5 INCH: 20 OINTMENT TOPICAL at 12:32

## 2017-10-07 RX ADMIN — Medication 10 ML: at 06:11

## 2017-10-07 RX ADMIN — IPRATROPIUM BROMIDE AND ALBUTEROL SULFATE 3 ML: .5; 3 SOLUTION RESPIRATORY (INHALATION) at 02:00

## 2017-10-07 RX ADMIN — GABAPENTIN 400 MG: 400 CAPSULE ORAL at 21:02

## 2017-10-07 NOTE — PROGRESS NOTES
Pharmacist Note  Warfarin Dosing  Consult provided for this 69 y. o.female to manage warfarin for Atrial Fibrillation    INR Goal: 2 - 3    Home regimen/ tablet size: 2.5 mg on Hir-Jci-Tiykl-Sat; 5 mg on Tues-Fri-Sun    Drugs that may increase INR: None  Drugs that may decrease INR: None  Other current anticoagulants/ drugs that may increase bleeding risk: SNRI-venlafaxine  Risk factors: Age > 65  Daily INR ordered: YES    Recent Labs      10/07/17   0611  10/06/17   2151   HGB  12.4  13.0   INR   --   2.0*     Date               INR                  Dose  10/6  2  --   10/7  --  7.5 mg                                                                                 Assessment/ Plan: Will order warfarin 7.5 mg PO x 1 dose as patient missed last night's dose. Patient usually takes 5 mg on Friday and 2.5 mg on Saturday. Pharmacy will continue to monitor daily and adjust therapy as indicated. Please contact the pharmacist at  for outpatient recommendations if needed.

## 2017-10-07 NOTE — ROUTINE PROCESS
TRANSFER - OUT REPORT:    Verbal report given to RN (name) on Jose Pillai  being transferred to CVSU (unit) for routine progression of care       Report consisted of patients Situation, Background, Assessment and   Recommendations(SBAR). Information from the following report(s) SBAR, ED Summary, Intake/Output, MAR and Cardiac Rhythm Afib was reviewed with the receiving nurse. Lines:   Peripheral IV 10/06/17 Right Antecubital (Active)   Site Assessment Clean, dry, & intact 10/6/2017  9:50 PM   Phlebitis Assessment 0 10/6/2017  9:50 PM   Infiltration Assessment 0 10/6/2017  9:50 PM   Dressing Status Clean, dry, & intact 10/6/2017  9:50 PM   Dressing Type Tape;Topical skin adhesive;Transparent 10/6/2017  9:50 PM   Hub Color/Line Status Green;Flushed;Patent 10/6/2017  9:50 PM   Action Taken Blood drawn 10/6/2017  9:50 PM        Opportunity for questions and clarification was provided.       Patient transported with:   Monitor  O2 @ 3 liters  Registered Nurse

## 2017-10-07 NOTE — ED PROVIDER NOTES
HPI Comments: 69 y.o. female with extensive past medical history, please see list, significant for lymphedema, hypertensive heart disease w/ HF, HTN, A-fib, COPD, CKD, hypothyroidism, and cardiomegaly who presents from home with chief complaint of mild SOB. Pt reports progressively worsening SOB for 1 week - worse with any exertion. She was seen for similar sx at [facility] ED 4 nights ago. She was recommended ED evaluation for admission during her follow up appointment today. See scanned letter from her PCP, Dr Gabbi Alvarez, requesting admission and noting that her  cannot care for her. Pt notes she has not taken her diuretic for 3-4 days, but has been taking all her other regular medications. Pt is on Coumadin. Pt denies chest pain and productive cough. There are no other acute medical concerns at this time. Noncompliant with diuretics bc it is difficult for her to urinate on a toilet. No vomiting or headache. Seen at [facility] several days ago - DC home. Note written by Jannell Severe, Scribe, as dictated by Regina Fair DO 7:44 PM    The history is provided by the patient.         Past Medical History:   Diagnosis Date    Arrhythmia     A Fib    Arthritis     Atrial fibrillation (HCC)     Cancer (HCC)     left breast cancer    Cardiomegaly     CKD (chronic kidney disease) stage 3, GFR 30-59 ml/min     judith    Clostridium difficile infection     Congestive heart failure, unspecified ([address]     COPD     Diarrhea     /constipation    Dyspepsia and other specified disorders of function of stomach     Elevated antinuclear antibody (MARYAM) level     Endocrine disease     hypothyroidism    GERD (gastroesophageal reflux disease)     Hearing loss     Heart failure (HCC)     Hypertension     Hypertensive heart disease with heart failure (HCC)     diastolic dysfunction    Joint pain     Leg swelling     Long term (current) use of anticoagulants     Lymphedema     Morbid obesity ([address]     Obesity, unspecified     Other ill-defined conditions     lymph edema    Pleural effusion     s/p VATS    Shortness of breath     Thyroid disease     Unspecified sleep apnea     Does not use machine       Past Surgical History:   Procedure Laterality Date    BREAST SURGERY PROCEDURE UNLISTED  13    LEFT BREAST MASTECTOMY SENTINEL NODE BIOPSY      HX BREAST BIOPSY      left    HX BREAST BIOPSY      fibroids removed right breast    HX  SECTION      times 2    HX KNEE ARTHROSCOPY      left knee     HX MODIFIED RADICAL MASTECTOMY  2013    BREAST MASTECTOMY MODIFIED RADICAL performed by Angi Tena MD at [facility] MAIN OR    HX OTHER SURGICAL      pleural effusion    HX RHINOPLASTY      HX DOMI AND BSO      HX TONSILLECTOMY           Family History:   Problem Relation Age of Onset    Lung Disease Mother      Emphysema    Heart Disease Mother     Hypertension Mother     Cancer Father      Lung Cancer    Heart Disease Father     Hypertension Father     Cancer Paternal Aunt      colon       Social History     Social History    Marital status:      Spouse name: N/A    Number of children: N/A    Years of education: N/A     Occupational History    Not on file. Social History Main Topics    Smoking status: Never Smoker    Smokeless tobacco: Never Used    Alcohol use No    Drug use: No    Sexual activity: Not on file     Other Topics Concern    Not on file     Social History Narrative    No narrative on file         ALLERGIES: Latex; Grass pollen-bermuda, standard; and Sulfa (sulfonamide antibiotics)    Review of Systems   Respiratory: Positive for shortness of breath. Negative for cough. Cardiovascular: Negative for chest pain. All other systems reviewed and are negative.       Vitals:    10/06/17 2245 10/06/17 2300 10/06/17 2315 10/06/17 2330   BP:       Pulse: (!) 101 99 95 96   Resp: 19 21 19 20   Temp:       SpO2: 96% 100% 99% 100%   Weight:       Height: Physical Exam      Constitutional: Pt is awake and alert. HENT:   Head: Normocephalic and atraumatic. Nose: Nose normal.   Mouth/Throat: Oropharynx is clear and moist. No oropharyngeal exudate. Eyes: Conjunctivae and extraocular motions are normal. Pupils are equal, round, and reactive to light. Right eye exhibits no discharge. Left eye exhibits no discharge. No scleral icterus. Neck: No tracheal deviation present. Supple neck. Cardiovascular: Normal rate, regular rhythm, normal heart sounds and intact distal pulses. Exam reveals no gallop and no friction rub. No murmur heard. Pulmonary/Chest: Inspiratory crackles and expiratory wheezes diffusely. Mild respiratory distress. Abdominal: Soft. Pt  exhibits no distension and no mass. No tenderness. Pt  has no rebound and no guarding. Musculoskeletal:   Ext: LE's extremely edematous, large consistent w/ severe lymphedema. Neurological:  Pt is alert. nonfocal neuro exam.  Skin: Skin is warm and dry. Pt  is not diaphoretic. Psychiatric:  Pt  has a normal mood and affect. Behavior is normal.     Note written by Rei Armstrong, as dictated by Jose Alejandromiarlene , DO 7:44 PM    [facility]  ED Course       Procedures    ED EKG interpretation:  Rhythm: atrial fib w/ PVC's; Rate (approx.): 100; Axis: normal; ST/T wave: no acute ST/T wave changes noted; normal intervals  Note written by Rei Armstrong, as dictated by [facility] Alford, DO 9:50 PM      cdr noted - worse effusion  D/w Dr Dorinda Bryan - he will see    Labs Reviewed   CBC WITH AUTOMATED DIFF - Abnormal; Notable for the following:        Result Value    RDW 15.8 (*)     ABS. EOSINOPHILS 0.5 (*)     All other components within normal limits   METABOLIC PANEL, COMPREHENSIVE - Abnormal; Notable for the following:     Glucose 106 (*)     Creatinine 1.25 (*)     BUN/Creatinine ratio 10 (*)     GFR est AA 51 (*)     GFR est non-AA 42 (*)     Bilirubin, total 1.2 (*)     Alk.  phosphatase 135 (*)     Albumin 3.4 (*)     A-G Ratio 0.9 (*)     All other components within normal limits   NT-PRO BNP - Abnormal; Notable for the following:     NT pro-BNP 6383 (*)     All other components within normal limits   PROTHROMBIN TIME + INR - Abnormal; Notable for the following:     INR 2.0 (*)     Prothrombin time 20.8 (*)     All other components within normal limits   TROPONIN I   SAMPLES BEING HELD

## 2017-10-07 NOTE — PROGRESS NOTES
Problem: Mobility Impaired (Adult and Pediatric)  Goal: *Acute Goals and Plan of Care (Insert Text)  Physical Therapy Goals  Initiated 10/7/2017  1. Patient will move from supine to sit and sit to supine , scoot up and down and roll side to side in bed with moderate assistance within 7 day(s). 2. Patient will transfer from bed to chair and chair to bed with maximal assistance using the least restrictive device within 7 day(s). 3. Patient will perform sit to stand with maximal assistance within 7 day(s). PHYSICAL THERAPY EVALUATION  Patient: Lupe Farrell (29 y.o. female)  Date: 10/7/2017  Primary Diagnosis: Acute on chronic diastolic heart failure [facility])        Precautions:   Fall      ASSESSMENT :  Based on the objective data described below, the patient presents with significantly decreased independence with mobility limited by poor respiratory status, decreased strength/ROM, severe lymphedema in B LEs. Patient reports fairly sedentary lifestyle and transfers to bedside commode with assist of family. Patient able to achieve EOB with mod A and use of bed rails. Knee pain also limiting factor. Patient interested in improving strength and mobility and she will benefit from rehab (SNF) at d/c. Patient will benefit from skilled intervention to address the above impairments.   Patients rehabilitation potential is considered to be Fair  Factors which may influence rehabilitation potential include:   [ ]         None noted  [ ]         Mental ability/status  [X]         Medical condition  [ ]         Home/family situation and support systems  [ ]         Safety awareness  [ ]         Pain tolerance/management  [ ]         Other:        PLAN :  Recommendations and Planned Interventions:  [X]           Bed Mobility Training             [ ]    Neuromuscular Re-Education  [X]           Transfer Training                   [ ]    Orthotic/Prosthetic Training  [ ]           Gait Training                         [ ] Modalities  [X]           Therapeutic Exercises           [ ]    Edema Management/Control  [ ]           Therapeutic Activities            [ ]    Patient and Family Training/Education  [ ]           Other (comment):     Frequency/Duration: Patient will be followed by physical therapy  3 times a week to address goals. Discharge Recommendations: Skilled Nursing Facility  Further Equipment Recommendations for Discharge: tbd       SUBJECTIVE:   Patient stated Id go to rehab.       OBJECTIVE DATA SUMMARY:   HISTORY:    Past Medical History:   Diagnosis Date    Arrhythmia       A Fib    Arthritis      Atrial fibrillation (HCC)      Cancer (HCC)       left breast cancer    Cardiomegaly      CKD (chronic kidney disease) stage 3, GFR 30-59 ml/min       judith    Clostridium difficile infection      Congestive heart failure, unspecified ([address]      COPD      Diarrhea       /constipation    Dyspepsia and other specified disorders of function of stomach      Elevated antinuclear antibody (MARYAM) level      Endocrine disease       hypothyroidism    GERD (gastroesophageal reflux disease)      Hearing loss      Heart failure (HCC)      Hypertension      Hypertensive heart disease with heart failure (HCC)       diastolic dysfunction    Joint pain      Leg swelling      Long term (current) use of anticoagulants      Lymphedema      Morbid obesity (HCC)      Obesity, unspecified      Other ill-defined conditions       lymph edema    Pleural effusion       s/p VATS    Shortness of breath      Thyroid disease      Unspecified sleep apnea       Does not use machine     Past Surgical History:   Procedure Laterality Date    BREAST SURGERY PROCEDURE UNLISTED   13     LEFT BREAST MASTECTOMY SENTINEL NODE BIOPSY      HX BREAST BIOPSY         left    HX BREAST BIOPSY         fibroids removed right breast    HX  SECTION         times 2    HX KNEE ARTHROSCOPY         left knee     HX MODIFIED RADICAL MASTECTOMY   9/19/2013     BREAST MASTECTOMY MODIFIED RADICAL performed by Isela Whitehead MD at [facility] MAIN OR    HX OTHER SURGICAL         pleural effusion    HX RHINOPLASTY        HX DOMI AND BSO        HX TONSILLECTOMY         Prior Level of Function/Home Situation: very limited mobility. Previous lymphedema therapy. Personal factors and/or comorbidities impacting plan of care:      Home Situation  Home Environment: Private residence  # Steps to Enter: 0  One/Two Story Residence: One story  Living Alone: No  Support Systems: Spouse/Significant Other/Partner, Family member(s)  Patient Expects to be Discharged to[de-identified] Rehabilitation facility  Current DME Used/Available at Home: Commode, bedside     EXAMINATION/PRESENTATION/DECISION MAKING:   Critical Behavior:              Hearing: Auditory  Auditory Impairment: Hard of hearing, bilateral, Hearing aid(s) (at home)  Hearing Aids/Status: At home     Range Of Motion:  AROM: Grossly decreased, non-functional           PROM: Grossly decreased, non-functional           Strength:    Strength: Generally decreased, functional                    Tone & Sensation:   Tone: Abnormal              Sensation: Impaired               Coordination:  Coordination: Generally decreased, functional  Vision:      Functional Mobility:  Bed Mobility:              Transfers:                             Balance:   Sitting: Intact; With support  Ambulation/Gait Training:                                                     Functional Measure:  Timed up and go:      Timed Get Up And Go Test: 0 (unable)      Timed Up and Go and G-code impairment scale:  Percentage of Impairment CH     0%    CI     1-19% CJ     20-39% CK     40-59% CL     60-79% CM     80-99% CN      100%   Timed   Score 0-56 10 11-12 13-14 15-16 17-18 19 20          < than 10 seconds=Normal  Greater then 13.5 seconds (in elderly)=Increased fall risk   Caitlin Cool [place] M.  Predicting the probability for falls in community dwelling older adults using the Timed Up and Go Test. Phys Ther. 2000;80:896-903. G codes: In compliance with CMSs Claims Based Outcome Reporting, the following G-code set was chosen for this patient based on their primary functional limitation being treated: The outcome measure chosen to determine the severity of the functional limitation was the TUG with a score of 0/unable which was correlated with the impairment scale. · Mobility - Walking and Moving Around:               - CURRENT STATUS:    CN - 100% impaired, limited or restricted               - GOAL STATUS:           CM - 80%-99% impaired, limited or restricted               - D/C STATUS:                       ---------------To be determined---------------      Physical Therapy Evaluation Charge Determination   History Examination Presentation Decision-Making   HIGH Complexity :3+ comorbidities / personal factors will impact the outcome/ POC  MEDIUM Complexity : 3 Standardized tests and measures addressing body structure, function, activity limitation and / or participation in recreation  LOW Complexity : Stable, uncomplicated  HIGH Complexity : FOTO score of 1- 25       Based on the above components, the patient evaluation is determined to be of the following complexity level: LOW      Pain:  Pain Scale 1: Numeric (0 - 10)  Pain Intensity 1: 0              Activity Tolerance:   Limited by resp status  Please refer to the flowsheet for vital signs taken during this treatment.   After treatment:   [ ]         Patient left in no apparent distress sitting up in chair  [X]         Patient left in no apparent distress in bed  [X]         Call bell left within reach  [X]         Nursing notified  [ ]         Caregiver present  [ ]         Bed alarm activated      COMMUNICATION/EDUCATION:   The patients plan of care was discussed with: Registered Nurse.  [X]         Fall prevention education was provided and the patient/caregiver indicated understanding. [X]         Patient/family have participated as able in goal setting and plan of care. [X]         Patient/family agree to work toward stated goals and plan of care. [ ]         Patient understands intent and goals of therapy, but is neutral about his/her participation. [ ]         Patient is unable to participate in goal setting and plan of care.      Thank you for this referral.  Acosta Hernandez, PT , DPT   Time Calculation: 18 mins

## 2017-10-07 NOTE — CONSULTS
[facility]         [address] [facility] [address], [place] [address]   [phone] fax [phone]    Name: Matias Cutler  [id number]  10/7/2017 10:23 AM    Assessment/Plan:  1. Acute on chronic diastolic CHF. NYHA Class IV on admission. Not able to do 6mwt. Not taking diuretics, fluid overloaded, edema. -ON Lasix 80IV, I/O 1L neg x 24h, will increase  -change to bumex, give metolazone today, add ntg and spironolactone  -change metoprolol to coreg  -proBNP 6383, trop neg  2. Morbid obesity Body mass index is 59.51 kg/(m^2). diet, weight loss  3. Afib not rate controlled, titrate up bblocker, optimize volume  -cont coumadin  -[id number]  4. CODP obestiy hypoventilation  5. HTN titrate agents today  6. CKD stage III  watch with diuresis  7. SOB multifactorial with obesity, copd, afib diastolic chf    gAdmit Date: 10/6/2017     Admit Diagnosis: Acute on chronic diastolic heart failure [facility])  Primary Care Physician:Eva Jackson MD     Attending Provider: Lidya Perez MD  CC/REASON FOR CONSULT:dCHF    Subjective:     Matias Cutler is a 69 y.o. female admitted for Acute on chronic diastolic heart failure ([facility] [address] Patient complains of  Increasing sob x 1 week. Does not walk. Increased edema, not able to say how much. Feels better after diuresis overnight. Not takign diuretics pta. bp uncontrolled, rate uncontrolled. Probably needs NH placement. Review of Symptoms:  A comprehensive review of systems was negative except for that written in the HPI.     Previous treatment/evaluation includes echocardiogram .  Cardiac risk factors: family history, dyslipidemia, diabetes mellitus, obesity, sedentary life style, hypertension, stress, post-menopausal.    Past Medical History:   Diagnosis Date    Arrhythmia     A Fib    Arthritis     Atrial fibrillation ([facility] [address] [facility]     Cancer (HCC)     left breast cancer    Cardiomegaly     CKD (chronic kidney disease) stage 3, GFR 30-59 ml/min     judith    Clostridium difficile infection     [address]estive heart failure, unspecified ([facility] [address] [facility]     COPD     Diarrhea     /constipation    Dyspepsia and other specified disorders of function of stomach     Elevated antinuclear antibody (MARYAM) level     Endocrine disease     hypothyroidism    GERD (gastroesophageal reflux disease)     Hearing loss     Heart failure (HCC)     Hypertension     Hypertensive heart disease with heart failure (HCC)     diastolic dysfunction    Joint pain     Leg swelling     Long term (current) use of anticoagulants     Lymphedema     Morbid obesity (HCC)     Obesity, unspecified     Other ill-defined conditions     lymph edema    Pleural effusion     s/p VATS    Shortness of breath     Thyroid disease     Unspecified sleep apnea     Does not use machine     Past Surgical History:   Procedure Laterality Date    BREAST SURGERY PROCEDURE UNLISTED  13    LEFT BREAST MASTECTOMY SENTINEL NODE BIOPSY      HX BREAST BIOPSY      left    HX BREAST BIOPSY      fibroids removed right breast    HX  SECTION      times 2    HX KNEE ARTHROSCOPY      left knee     HX MODIFIED RADICAL MASTECTOMY  2013    BREAST MASTECTOMY MODIFIED RADICAL performed by [facility] MA[facility] OR    HX OTHER SURGICAL      pleural effusion    HX RHINOPLASTY      HX DOMI AND BSO      HX TONSILLECTOMY       Current Facility-Administered Medications   Medication Dose Route Frequency    nystatin (MYCOSTATIN) 100,000 unit/gram powder   Topical TID    allopurinol (ZYLOPRIM) tablet 100 mg  100 mg Oral DAILY    docusate (COLACE) 50 mg/5 mL oral liquid 50 mg  50 mg Oral BID PRN    gabapentin (NEURONTIN) capsule 400 mg  400 mg Oral QHS    guaiFENesin ER (MUCINEX) tablet 600 mg  600 mg Oral BID    lactobac ac& pc-s.therm-b.anim (PHI Q/RISAQUAD)  1 Cap Oral DAILY    levothyroxine (SYNTHROID) tablet 112 mcg  112 mcg Oral ACB    loratadine (CLARITIN) tablet 10 mg  10 mg Oral DAILY    metoprolol tartrate (LOPRESSOR) tablet 50 mg  50 mg Oral BID    pantoprazole (PROTONIX) tablet 40 mg  40 mg Oral DAILY    potassium chloride SR (KLOR-CON 10) tablet 20 mEq  20 mEq Oral BID    venlafaxine (EFFEXOR) tablet 75 mg  75 mg Oral BID    sodium chloride (NS) flush 5-10 mL  5-10 mL IntraVENous Q8H    sodium chloride (NS) flush 5-10 mL  5-10 mL IntraVENous PRN    ondansetron (ZOFRAN) injection 4 mg  4 mg IntraVENous Q4H PRN    albuterol-ipratropium (DUO-NEB) 2.5 MG-0.5 MG/3 ML  3 mL Nebulization Q4H PRN    furosemide (LASIX) injection 80 mg  80 mg IntraVENous Q12H    acetaminophen (TYLENOL) tablet 650 mg  650 mg Oral Q4H PRN       Allergies   Allergen Reactions    Latex Rash    Grass Pollen-[place], Standard Unknown (comments)    Sulfa (Sulfonamide Antibiotics) Unknown (comments)      Family History   Problem Relation Age of Onset    Lung Disease Mother      Emphysema    Heart Disease Mother     Hypertension Mother     Cancer Father      Lung Cancer    Heart Disease Father     Hypertension Father     Cancer Paternal Aunt      colon      Social History     Social History    Marital status:      Spouse name: N/A    Number of children: N/A    Years of education: N/A     Social History Main Topics    Smoking status: Never Smoker    Smokeless tobacco: Never Used    Alcohol use No    Drug use: No    Sexual activity: Not on file     Other Topics Concern    Not on file     Social History Narrative    No narrative on file          Objective:      Physical Exam  Vitals:    10/07/17 0115 10/07/17 0201 10/07/17 0339 10/07/17 0736   BP: (!) 164/110  138/82 135/73   Pulse: (!) 103  97 (!) 104   Resp: 24  22 22   Temp: 97.5 °F (36.4 °C)  97.6 °F (36.4 °C) 97.7 °F (36.5 °C)   SpO2: 98% 100% 100% 95%   Weight: (!) 403 lb (182.8 kg)      Height: 5' 9\" (1.753 m)          General:  Alert, cooperative, no distress, appears stated age. Eyes:  Conjunctivae/corneas clear. PERRL, EOMs intact. Ears:  Normal external ear canals both ears. Nose:  No drainage or sinus tenderness. Mouth/Throat: Moist mucous membranes. Dentition normal.    Neck: Symmetrical, trachea midline, no carotid bruit and no JVD. Back:   No CVA tenderness   Lungs:   Clear to auscultation bilaterally. Heart:  Regular rate and rhythm, S1, S2 normal, no murmur, click, rub or gallop. Abdomen:   Soft, non-tender. Bowel sounds normal. No masses,  No organomegaly. Extremities: Extremities  atraumatic, no cyanosis or edema. Vascular: 2+ and symmetric UE/LE bilat   Skin: Skin color normal. No rashes or lesions   Lymph nodes: No Lymphadenopathy   Neurologic: CNII-XII intact. Normal strength        Telemetry: AFIB  ECG: atrial fibrillation, rate 110  Echocardiogram: Not done    Data Review:     Recent Labs      10/07/17   0611  10/06/17   2151   CPK  58   --    CKMB  1.5   --    TROIQ  <0.04  <0.04     Recent Labs      10/07/17   0611  10/06/17   2151   NA  140  141   K  3.8  4.3   CL  102  103   CO2  29  29   BUN  12  13   CREA  1.29*  1.25*   GLU  90  106*   PHOS  3.0   --    CA  8.8  8.7     Recent Labs      10/07/17   0611  10/06/17   2151   WBC  8.0  7.9   HGB  12.4  13.0   HCT  40.4  41.5   PLT  192  199     Recent Labs      10/07/17   0611  10/06/17   2151   PTP   --   20.8*   INR   --   2.0*   SGOT  17 17   AP  130*  135*     No results for input(s): CHOL, LDLC in the last 72 hours. No lab exists for component: TGL, HDLC,  HBA1C  Recent Labs      10/07/17   0611   TSH  5.40*     Thank you very much for this referral. I appreciate the opportunity to participate in this patient's care.       Ethel Verma MD  CC:Eva Jackson MD

## 2017-10-07 NOTE — PROGRESS NOTES
78-year-old woman with a past medical history significant for bilateral lower extremity edema, atrialfibrillation, obstructive sleep apnea, COPD, hypothyroidism, hypertension, depression, chronic kidney disease stage 3, morbid obesity, admitted for acute on chronic diastolic HF. Feels significantly better compared to last night. Denies chest pain, sob, abdominal pain. No acute events overnight. Acute on chronic diastolic CHF: Cardiology on consults. Changed her lasix to Bumex and metolazone. On Coreg, Nitro, spiranolactone. A.fib; on coumadin and coreg    Morbid obesity    CKD-3 : monitor renal function    COPD, HTN     DVT PPX on coumadin.     PE:    Gen: Not in acute distress  HEENT: atraumatic, normocephalic, moist mucous membranes  CVS: S1, S2 with in normal limits, tachy  RS; clear to auscultate  GI : obese, NT, ND bowel sounds present   Extremities: LE edematous, lymph edema, erythematous  Neuro Alert and oriented x3

## 2017-10-07 NOTE — H&P
[address] [facility] [address], [address]   HISTORY AND PHYSICAL       Name:  Yisel Hubre   MR#:  [id number]   :  1940   Account #:  [de-identified]        Date of Adm:  10/06/2017       PRIMARY CARE PHYSICIAN: Dr. Sky Rogers. SOURCE OF INFORMATION: The patient and the patient's spouse,   who was present at the bedside. CHIEF COMPLAINT: Shortness of breath. HISTORY OF PRESENT ILLNESS: This is a 78-year-old woman with   a past medical history significant for bilateral lower extremity edema,   atrial fibrillation, obstructive sleep apnea, COPD, hypothyroidism,   hypertension, depression, chronic kidney disease stage 3, morbid   obesity, who was in her usual state of health until about a week ago   when the patient developed shortness of breath which is progressive   and getting worse. The shortness of breath is with little or no exertion,   not associated with fever, cough or chills. The patient also denies   associated chest pain. The patient was seen 4 days ago at [facility]. She went for follow up with her primary care   physician today, she was sent from the primary care physician's office   to the emergency room to be evaluated for admission because of the   shortness of breath. It was also reported that the patient required   considerable care at home, which the  cannot provide. Primary care physician was requesting admission on that basis as well. When the patient arrived at the emergency room, chest x-ray was   obtained. The chest x-ray shows new left pleural effusion. The patient   has elevated BNP level. She was referred to the hospitalist service for   evaluation for admission. The patient has history of diastolic   congestive heart failure compliance with a diuretic at home, has not   been very good because it is difficult for the patient to get to the   bathroom after taking Lasix.     PAST MEDICAL HISTORY: Atrial fibrillation, left breast cancer, chronic   kidney disease, diastolic congestive heart failure, COPD,   hypothyroidism, hypertension, morbid obesity, bilateral lymphedema,   obstructive sleep apnea. ALLERGIES: THE PATIENT IS ALLERGIC TO SULFA. MEDICATIONS    1. Allopurinol 100 mg daily. 2. Colace 50 mg twice daily. 3. Ferrous sulfate one tablet twice daily. 4. Lasix 120 mg twice daily. 5. Neurontin 400 mg daily at bedtime. 6. Mucinex 600 mg twice daily. 7. Synthroid 112 mcg daily. 8. Claritin 10 mg daily. 9. Lopressor 50 mg twice daily. 10. Protonix 40 mg daily. 11. Potassium chloride 20 mEq twice daily. 12. Effexor 75 mg twice daily. 13. Coumadin, dosage as directed. FAMILY HISTORY: This was reviewed. Her mother had lung disease   and heart disease. Father had lung cancer and heart disease. PAST SURGICAL HISTORY: This is significant for left breast   mastectomy,  section, left knee arthroscopy, tonsillectomy. SOCIAL HISTORY: No history of alcohol or tobacco abuse. REVIEW OF SYSTEMS   HEAD, EYES, EARS, NOSE AND THROAT: No headache, no   dizziness, no blurring of vision, no photophobia. RESPIRATORY SYSTEM: This is positive for shortness of breath and   cough. No hemoptysis. CARDIOVASCULAR SYSTEM: This is positive for orthopnea. No   chest pain, no palpitation. GASTROINTESTINAL SYSTEM: No nausea or vomiting. No diarrhea,   no constipation. GENITOURINARY: No dysuria, no urgency, and no frequency. All   other systems are reviewed and they are negative. PHYSICAL EXAMINATION   GENERAL APPEARANCE: The patient appeared ill, in moderate   distress. VITAL SIGNS: On arrival at the emergency room, temperature 97.6,   pulse 115, respiratory rate 28, blood pressure 144/88, oxygen   saturation 96% on oxygen. HEAD: Normocephalic, atraumatic. EYES: Normal eye movements. No redness, no drainage, no   discharge. EARS: Normal external ears with no obvious drainage.    NOSE: No deformity, no drainage. MOUTH AND THROAT: No visible oral lesions. NECK: Neck is supple. Mild JVD, no thyromegaly. CHEST: Bilateral basilar crackles and few expiratory wheezing. HEART: Normal S1 and S2, irregularly irregular. No clinically   appreciable murmur. ABDOMEN: Soft, obese, nontender, normal bowel sounds. CENTRAL NERVOUS SYSTEM: Alert, oriented x3. Hearing   impairment noted. No gross focal neurological deficits. EXTREMITIES:   Bilateral lower extremity lymphedema noted. MUSCULOSKELETAL:   Bilateral lower extremity lymphedema noted. SKIN: Redness and swelling of the bilateral lower extremity   lymphedema noted. PSYCHIATRY: Normal mood but flat affect. LYMPHATIC: No cervical lymphadenopathy. DIAGNOSTIC DATA: Chest x-ray shows left pleural effusion. LABORATORY DATA: Chemistry - Sodium 141, potassium 4.3,   chloride 103, CO2 29, glucose 106, BUN 13, creatinine 1.25, calcium   8.7, bilirubin total 1.2, ALT 15, AST 17, alkaline phosphatase 135, total   protein 7.3, albumin level 3.4, globulin at 3.9. Cardiac profile: Troponin   less than 0.04. ProBNP 6383. Coagulation profile: INR 2.0, PT 20.8. Hematology: WBC 7.9, hemoglobin 13.0, hematocrit 41.5, platelets   705. ASSESSMENT    1. Acute on chronic diastolic congestive heart failure. 2. Bilateral lower extremity lymphedema. 3. Persistent atrial fibrillation. 4. Obstructive sleep apnea. 5. Chronic obstructive pulmonary disease. 6. Left pleural effusion. 7. Hypothyroidism. 8. Hypertension. 9. Depression. 10. Morbid obesity. 11. Chronic kidney disease, stage 3. PLAN    1. Acute on chronic diastolic congestive heart failure. We will admit the   patient for further evaluation and treatment. We will start the patient on   Lasix. We will obtain an echocardiogram to determine the ejection   fraction. We will check cardiac markers.  Cardiology consult will   requested to assist in evaluation and treatment of acute on chronic   diastolic congestive heart failure. 2. Bilateral lower extremity lymphedema. Wound care consult will be   requested. We will continue supportive therapy. 3. Persistent atrial fibrillation, will obtain an EKG, will resume   preadmission medication including Coumadin for anticoagulation. 4. Obstructive sleep apnea. The patient cannot tolerate CPAP or   BiPAP, will continue with supplemental oxygen. 5. Chronic obstructive pulmonary disease. We will resume her   preadmission medication including supplemental oxygen. 6. Left pleural effusion, will obtain a CT scan of the chest for further   evaluation of the pleural effusion. The patient may require a pulmonary   consult to assist in evaluation and treatment of the left pleural effusion. 7. Hypothyroidism, will continue with Synthroid, will check a TSH level. 8. Hypertension. We will resume her preadmission medication. Monitor   the patient's blood pressure closely. 9. Depression. We will continue with home medications. 10. Morbid obesity. Dietary consult will be requested for dietary   counseling. 11. Chronic kidney disease, stage 3, will monitor the patient's renal   function. 12. Generalized weakness. We will request physical   therapy/occupational therapy evaluation and treatment. OTHER ISSUES    CODE STATUS: THE PATIENT IS A FULL CODE. The patient is already on Coumadin with therapeutic INR. Because of   that, there is no need for deep venous thrombosis prophylaxis with   Lovenox.               Alba Leal MD      RE / RACHAEL   D:  10/07/2017   03:52   T:  10/07/2017   05:57   Job #:  [id number]

## 2017-10-07 NOTE — PROGRESS NOTES
Problem: Falls - Risk of  Goal: *Absence of Falls  Document Jacob Fall Risk and appropriate interventions in the flowsheet.   Outcome: Progressing Towards Goal  Fall Risk Interventions:

## 2017-10-07 NOTE — PROGRESS NOTES
TRANSFER - IN REPORT:    Verbal report received from ED on Mei Norton  being received from ED for routine progression of care      Report consisted of patients Situation, Background, Assessment and   Recommendations(SBAR). Information from the following report(s) SBAR, Intake/Output, MAR and Recent Results was reviewed with the receiving nurse. Opportunity for questions and clarification was provided. Assessment completed upon patients arrival to unit and care assumed.

## 2017-10-07 NOTE — PROGRESS NOTES
Admission Medication Reconciliation:    Information obtained from:  provided medication chart, patient, RX Query    Significant PMH/Disease States:   Past Medical History:   Diagnosis Date    Arrhythmia     A Fib    Arthritis     Atrial fibrillation (HCC)     Cancer (HCC)     left breast cancer    Cardiomegaly     CKD (chronic kidney disease) stage 3, GFR 30-59 ml/min     judith    Clostridium difficile infection     Congestive heart failure, unspecified ([facility]     COPD     Diarrhea     /constipation    Dyspepsia and other specified disorders of function of stomach     Elevated antinuclear antibody (MARYAM) level     Endocrine disease     hypothyroidism    GERD (gastroesophageal reflux disease)     Hearing loss     Heart failure (HCC)     Hypertension     Hypertensive heart disease with heart failure (HCC)     diastolic dysfunction    Joint pain     Leg swelling     Long term (current) use of anticoagulants     Lymphedema     Morbid obesity (HCC)     Obesity, unspecified     Other ill-defined conditions     lymph edema    Pleural effusion     s/p VATS    Shortness of breath     Thyroid disease     Unspecified sleep apnea     Does not use machine       Chief Complaint for this Admission:  SOB    Allergies:  Latex; Grass pollen-bermuda, standard; and Sulfa (sulfonamide antibiotics)    Prior to Admission Medications:   Prior to Admission Medications   Prescriptions Last Dose Informant Patient Reported? Taking? FERROUS SULFATE, DRIED (IRON, DRIED, PO) 10/6/2017 at am  Yes Yes   Sig: Take 65 mg by mouth two (2) times a day. FUROSEMIDE PO 10/5/2017 at pm  Yes Yes   Sig: Take 120 mg by mouth two (2) times a day. LACTOBACILLUS RHAMNOSUS GG (PROBIOTIC PO) 10/6/2017 at am  Yes Yes   Sig: Take  by mouth. 15 billion    POTASSIUM CHLORIDE (KLOR-CON PO) 10/5/2017 at pm  Yes Yes   Sig: Take 20 mEq by mouth two (2) times a day.    allopurinol (ZYLOPRIM) 100 mg tablet 10/6/2017 at am  Yes Yes Sig: Take 100 mg by mouth daily. docusate sodium (COLACE) 50 mg capsule   Yes Yes   Sig: Take 50 mg by mouth two (2) times daily as needed for Constipation. ergocalciferol (ERGOCALCIFEROL) 50,000 unit capsule 10/2/2017 at am  Yes Yes   Sig: Take 50,000 Units by mouth.   gabapentin (NEURONTIN) 400 mg capsule 10/5/2017 at pm Self Yes Yes   Sig: Take 400 mg by mouth nightly. guaiFENesin ER (MUCINEX) 600 mg ER tablet 10/6/2017 at am  Yes Yes   Sig: Take 600 mg by mouth two (2) times a day. levothyroxine (SYNTHROID) 112 mcg tablet 10/6/2017 at am  No Yes   Sig: Take 1 Tab by mouth Daily (before breakfast). loratadine (CLARITIN) 10 mg tablet 10/6/2017 at am Self Yes Yes   Sig: Take 10 mg by mouth daily. metoprolol tartrate (LOPRESSOR) 50 mg tablet 10/6/2017 at am  No Yes   Sig: Take 1 Tab by mouth two (2) times a day. pantoprazole (PROTONIX) 40 mg tablet 10/6/2017 at am  Yes Yes   Sig: Take 40 mg by mouth daily. venlafaxine (EFFEXOR) 75 mg tablet 10/6/2017 at am  Yes Yes   Sig: Take 75 mg by mouth two (2) times a day. warfarin (COUMADIN) 2.5 mg tablet 10/5/2017 at pm  Yes Yes   Sig: Take 2.5 mg by mouth every Monday, Thursday, Saturday. 2.5 mg: Monday, Wednesday, Thursday, Saturday   warfarin (COUMADIN) 2.5 mg tablet 10/2/2017 at pm  Yes Yes   Sig: Take 2.5 mg by mouth every Wednesday. 2.5 mg: Monday, Wednesday, Thursday, Saturday   warfarin (COUMADIN) 5 mg tablet 10/3/2017 at pm  Yes Yes   Sig: Take 5 mg by mouth every Tuesday and Friday. 5 mg: Sunday, Tuesday, Friday   warfarin (COUMADIN) 5 mg tablet 10/1/2017 at pm  Yes Yes   Sig: Take 5 mg by mouth every Sunday. 5 mg: Sunday, Tuesday, Friday      Facility-Administered Medications: None         Comments/Recommendations:    and patient are excellent historians. Allergies were reviewed and confirmed. Note:  1. Missed Wednesday Warfarin due to elevated INR (~4 per )  Schedule noted on each warfarin entry above.   2. Takes potassium with lasix    Added:  1. Docusate  2. Vitamin D2    Thank you for allowing me to participate in the care of your patient.     Eloy Mcdermott PharmD, RN #[id number]

## 2017-10-08 LAB
ANION GAP SERPL CALC-SCNC: 9 MMOL/L (ref 5–15)
ATRIAL RATE: 120 BPM
BASOPHILS # BLD: 0 K/UL (ref 0–0.1)
BASOPHILS # BLD: 0 K/UL (ref 0–0.1)
BASOPHILS NFR BLD: 0 % (ref 0–1)
BASOPHILS NFR BLD: 0 % (ref 0–1)
BUN SERPL-MCNC: 14 MG/DL (ref 6–20)
BUN/CREAT SERPL: 10 (ref 12–20)
CALCIUM SERPL-MCNC: 8.6 MG/DL (ref 8.5–10.1)
CALCULATED R AXIS, ECG10: 58 DEGREES
CALCULATED T AXIS, ECG11: 43 DEGREES
CHLORIDE SERPL-SCNC: 100 MMOL/L (ref 97–108)
CO2 SERPL-SCNC: 32 MMOL/L (ref 21–32)
CREAT SERPL-MCNC: 1.38 MG/DL (ref 0.55–1.02)
DIAGNOSIS, 93000: NORMAL
EOSINOPHIL # BLD: 0.4 K/UL (ref 0–0.4)
EOSINOPHIL # BLD: 0.5 K/UL (ref 0–0.4)
EOSINOPHIL NFR BLD: 6 % (ref 0–7)
EOSINOPHIL NFR BLD: 7 % (ref 0–7)
ERYTHROCYTE [DISTWIDTH] IN BLOOD BY AUTOMATED COUNT: 15.9 % (ref 11.5–14.5)
ERYTHROCYTE [DISTWIDTH] IN BLOOD BY AUTOMATED COUNT: 15.9 % (ref 11.5–14.5)
GLUCOSE SERPL-MCNC: 120 MG/DL (ref 65–100)
HCT VFR BLD AUTO: 37.9 % (ref 35–47)
HCT VFR BLD AUTO: 38.7 % (ref 35–47)
HGB BLD-MCNC: 11.5 G/DL (ref 11.5–16)
HGB BLD-MCNC: 12.1 G/DL (ref 11.5–16)
INR PPP: 2.3 (ref 0.9–1.1)
LYMPHOCYTES # BLD: 0.9 K/UL (ref 0.8–3.5)
LYMPHOCYTES # BLD: 1 K/UL (ref 0.8–3.5)
LYMPHOCYTES NFR BLD: 13 % (ref 12–49)
LYMPHOCYTES NFR BLD: 14 % (ref 12–49)
MAGNESIUM SERPL-MCNC: 1.6 MG/DL (ref 1.6–2.4)
MCH RBC QN AUTO: 28.3 PG (ref 26–34)
MCH RBC QN AUTO: 28.9 PG (ref 26–34)
MCHC RBC AUTO-ENTMCNC: 30.3 G/DL (ref 30–36.5)
MCHC RBC AUTO-ENTMCNC: 31.3 G/DL (ref 30–36.5)
MCV RBC AUTO: 92.4 FL (ref 80–99)
MCV RBC AUTO: 93.3 FL (ref 80–99)
MONOCYTES # BLD: 0.9 K/UL (ref 0–1)
MONOCYTES # BLD: 0.9 K/UL (ref 0–1)
MONOCYTES NFR BLD: 12 % (ref 5–13)
MONOCYTES NFR BLD: 13 % (ref 5–13)
NEUTS SEG # BLD: 4.7 K/UL (ref 1.8–8)
NEUTS SEG # BLD: 4.7 K/UL (ref 1.8–8)
NEUTS SEG NFR BLD: 67 % (ref 32–75)
NEUTS SEG NFR BLD: 68 % (ref 32–75)
PLATELET # BLD AUTO: 186 K/UL (ref 150–400)
PLATELET # BLD AUTO: 202 K/UL (ref 150–400)
POTASSIUM SERPL-SCNC: 3.5 MMOL/L (ref 3.5–5.1)
PROTHROMBIN TIME: 23.8 SEC (ref 9–11.1)
Q-T INTERVAL, ECG07: 318 MS
QRS DURATION, ECG06: 92 MS
QTC CALCULATION (BEZET), ECG08: 410 MS
RBC # BLD AUTO: 4.06 M/UL (ref 3.8–5.2)
RBC # BLD AUTO: 4.19 M/UL (ref 3.8–5.2)
SODIUM SERPL-SCNC: 141 MMOL/L (ref 136–145)
VENTRICULAR RATE, ECG03: 100 BPM
WBC # BLD AUTO: 7 K/UL (ref 3.6–11)
WBC # BLD AUTO: 7 K/UL (ref 3.6–11)

## 2017-10-08 PROCEDURE — 74011250637 HC RX REV CODE- 250/637: Performed by: INTERNAL MEDICINE

## 2017-10-08 PROCEDURE — 65660000000 HC RM CCU STEPDOWN

## 2017-10-08 PROCEDURE — 85025 COMPLETE CBC W/AUTO DIFF WBC: CPT | Performed by: HOSPITALIST

## 2017-10-08 PROCEDURE — 74011250637 HC RX REV CODE- 250/637: Performed by: HOSPITALIST

## 2017-10-08 PROCEDURE — 36415 COLL VENOUS BLD VENIPUNCTURE: CPT | Performed by: HOSPITALIST

## 2017-10-08 PROCEDURE — 83735 ASSAY OF MAGNESIUM: CPT | Performed by: HOSPITALIST

## 2017-10-08 PROCEDURE — 74011250636 HC RX REV CODE- 250/636: Performed by: INTERNAL MEDICINE

## 2017-10-08 PROCEDURE — 80048 BASIC METABOLIC PNL TOTAL CA: CPT | Performed by: HOSPITALIST

## 2017-10-08 PROCEDURE — 94640 AIRWAY INHALATION TREATMENT: CPT

## 2017-10-08 PROCEDURE — 74011000250 HC RX REV CODE- 250: Performed by: INTERNAL MEDICINE

## 2017-10-08 PROCEDURE — 77010033678 HC OXYGEN DAILY

## 2017-10-08 PROCEDURE — 85610 PROTHROMBIN TIME: CPT | Performed by: HOSPITALIST

## 2017-10-08 RX ORDER — MAGNESIUM SULFATE HEPTAHYDRATE 40 MG/ML
2 INJECTION, SOLUTION INTRAVENOUS ONCE
Status: COMPLETED | OUTPATIENT
Start: 2017-10-08 | End: 2017-10-08

## 2017-10-08 RX ORDER — METOLAZONE 5 MG/1
5 TABLET ORAL DAILY
Status: DISCONTINUED | OUTPATIENT
Start: 2017-10-08 | End: 2017-10-13

## 2017-10-08 RX ORDER — POTASSIUM CHLORIDE 750 MG/1
40 TABLET, FILM COATED, EXTENDED RELEASE ORAL
Status: COMPLETED | OUTPATIENT
Start: 2017-10-08 | End: 2017-10-08

## 2017-10-08 RX ORDER — WARFARIN SODIUM 5 MG/1
5 TABLET ORAL ONCE
Status: COMPLETED | OUTPATIENT
Start: 2017-10-08 | End: 2017-10-08

## 2017-10-08 RX ADMIN — CARVEDILOL 12.5 MG: 12.5 TABLET, FILM COATED ORAL at 09:04

## 2017-10-08 RX ADMIN — GUAIFENESIN 600 MG: 600 TABLET, EXTENDED RELEASE ORAL at 09:01

## 2017-10-08 RX ADMIN — ACETAMINOPHEN 650 MG: 325 TABLET, FILM COATED ORAL at 10:38

## 2017-10-08 RX ADMIN — PANTOPRAZOLE SODIUM 40 MG: 40 TABLET, DELAYED RELEASE ORAL at 09:03

## 2017-10-08 RX ADMIN — BUMETANIDE 2 MG: 0.25 INJECTION INTRAMUSCULAR; INTRAVENOUS at 17:46

## 2017-10-08 RX ADMIN — NITROGLYCERIN 0.5 INCH: 20 OINTMENT TOPICAL at 23:40

## 2017-10-08 RX ADMIN — IPRATROPIUM BROMIDE AND ALBUTEROL SULFATE 3 ML: .5; 3 SOLUTION RESPIRATORY (INHALATION) at 02:07

## 2017-10-08 RX ADMIN — MAGNESIUM SULFATE HEPTAHYDRATE 2 G: 40 INJECTION, SOLUTION INTRAVENOUS at 10:39

## 2017-10-08 RX ADMIN — POTASSIUM CHLORIDE 20 MEQ: 750 TABLET, FILM COATED, EXTENDED RELEASE ORAL at 17:39

## 2017-10-08 RX ADMIN — Medication 10 ML: at 05:35

## 2017-10-08 RX ADMIN — CARVEDILOL 12.5 MG: 12.5 TABLET, FILM COATED ORAL at 17:43

## 2017-10-08 RX ADMIN — ALLOPURINOL 100 MG: 100 TABLET ORAL at 09:03

## 2017-10-08 RX ADMIN — Medication 10 ML: at 15:27

## 2017-10-08 RX ADMIN — IPRATROPIUM BROMIDE AND ALBUTEROL SULFATE 3 ML: .5; 3 SOLUTION RESPIRATORY (INHALATION) at 22:42

## 2017-10-08 RX ADMIN — NYSTATIN: 100000 POWDER TOPICAL at 04:00

## 2017-10-08 RX ADMIN — POTASSIUM CHLORIDE 20 MEQ: 750 TABLET, FILM COATED, EXTENDED RELEASE ORAL at 09:01

## 2017-10-08 RX ADMIN — Medication 1 CAPSULE: at 09:01

## 2017-10-08 RX ADMIN — NITROGLYCERIN 0.5 INCH: 20 OINTMENT TOPICAL at 17:44

## 2017-10-08 RX ADMIN — IPRATROPIUM BROMIDE AND ALBUTEROL SULFATE 3 ML: .5; 3 SOLUTION RESPIRATORY (INHALATION) at 09:20

## 2017-10-08 RX ADMIN — NITROGLYCERIN 0.5 INCH: 20 OINTMENT TOPICAL at 10:38

## 2017-10-08 RX ADMIN — NYSTATIN: 100000 POWDER TOPICAL at 17:48

## 2017-10-08 RX ADMIN — GUAIFENESIN 600 MG: 600 TABLET, EXTENDED RELEASE ORAL at 22:01

## 2017-10-08 RX ADMIN — LOSARTAN POTASSIUM 12.5 MG: 25 TABLET ORAL at 09:03

## 2017-10-08 RX ADMIN — LEVOTHYROXINE SODIUM 112 MCG: 112 TABLET ORAL at 06:48

## 2017-10-08 RX ADMIN — VENLAFAXINE 75 MG: 37.5 TABLET ORAL at 17:47

## 2017-10-08 RX ADMIN — Medication 10 ML: at 22:01

## 2017-10-08 RX ADMIN — VENLAFAXINE 75 MG: 37.5 TABLET ORAL at 09:03

## 2017-10-08 RX ADMIN — GABAPENTIN 400 MG: 400 CAPSULE ORAL at 22:01

## 2017-10-08 RX ADMIN — NYSTATIN: 100000 POWDER TOPICAL at 22:01

## 2017-10-08 RX ADMIN — METOLAZONE 5 MG: 5 TABLET ORAL at 10:37

## 2017-10-08 RX ADMIN — LORATADINE 10 MG: 10 TABLET ORAL at 09:02

## 2017-10-08 RX ADMIN — WARFARIN SODIUM 5 MG: 5 TABLET ORAL at 17:43

## 2017-10-08 RX ADMIN — POTASSIUM CHLORIDE 40 MEQ: 750 TABLET, FILM COATED, EXTENDED RELEASE ORAL at 10:37

## 2017-10-08 RX ADMIN — BUMETANIDE 2 MG: 0.25 INJECTION INTRAMUSCULAR; INTRAVENOUS at 09:03

## 2017-10-08 RX ADMIN — ACETAMINOPHEN 650 MG: 325 TABLET, FILM COATED ORAL at 03:48

## 2017-10-08 NOTE — PROGRESS NOTES
Pharmacist Note  Warfarin Dosing  Consult provided for this 69 y. o.female to manage warfarin for h/o Atrial Fibrillation. INR Goal: 2 - 3    Home regimen/ tablet size: 2.5 mg on Vda-Tfb-Hxcoi-Sat; 5 mg on Tues-Fri-Sun    Drugs that may increase INR:  Allopurinol, Levothyroxine, Venlafaxine  Drugs that may decrease INR: None  Other current anticoagulants/ drugs that may increase bleeding risk: none  Risk factors: Age > 65  Daily INR ordered: YES    Recent Labs      10/08/17   0140  10/07/17   0611  10/06/17   2151   HGB  11.5  12.4  13.0   INR  2.3*   --   2.0*     Date               INR                  Dose  10/06            2.0      --   10/07  --  7.5 mg    10/08              2.3                    5 mg                                                                               Assessment/ Plan: Will order warfarin 5mg po x1 today. Pharmacy will continue to monitor daily and adjust therapy as indicated.

## 2017-10-08 NOTE — PROGRESS NOTES
Hospitalist Progress Note  Mari Foy MD  Office: [phone]        Date of Service:  10/8/2017  NAME:  Eva Vargas  :  1940  MRN:  [id number]      Admission Summary:   52-year-old woman with a past medical history significant for bilateral lower extremity edema, atrialfibrillation, obstructive sleep apnea, COPD, hypothyroidism, hypertension, depression, chronic kidney disease stage 3, morbid obesity, admitted for acute on chronic diastolic HF. Interval history / Subjective:     Feels significantly better. Denies chest pain, sob, abdominal pain. NSVT this AM.     Assessment & Plan:     Acute on chronic diastolic CHF: Diuresis as per cardiology. On Bumex and metolazone. On Coreg, Nitro, Losartan. Left mod pleural effusion on CXR: repeat imaging CXR or CT after diuresis to see improvement. If not then may need further evaluation.     A.fib: on coumadin and coreg    Sleep apnea: pt intolerant of BiPAP or CPAP.     Morbid obesity: discussed a about healthy lifestyle modification and comorbidities associated with it.     CKD-3 : monitor renal function with diuresis. monitor electrolytes and replace as needed.     COPD: nebs and home meds. HTN coreg, losartan  DVT PPX on coumadin. Care Plan discussed with: patient  Disposition: rehab after diuresis     Hospital Problems  Date Reviewed: 10/7/2017          Codes Class Noted POA    * (Principal)Acute on chronic diastolic heart failure ([facility] [address] ICD-10-CM: I50.33  ICD-9-CM: 428.33  10/6/2017 Yes                Review of Systems:   No sob, chest pain, head ache, dizziness. Vital Signs:    Last 24hrs VS reviewed since prior progress note.  Most recent are:  Visit Vitals    /80 (BP 1 Location: Right arm, BP Patient Position: At rest)    Pulse 99    Temp 97.5 °F (36.4 °C)    Resp 20    Ht 5' 9\" (1.753 m)    Wt (!) 180.9 kg (398 lb 14.4 oz)    SpO2 95%    Breastfeeding No  BMI 58.91 kg/m2         Intake/Output Summary (Last 24 hours) at 10/08/17 1301  Last data filed at 10/08/17 1229   Gross per 24 hour   Intake              400 ml   Output             4100 ml   Net            -3700 ml        Physical Examination:       Gen: Not in acute distress  HEENT: atraumatic, normocephalic, moist mucous membranes  CVS: S1, S2 with in normal limits, tachy  RS: wheezing positive  GI : obese, NT, ND bowel sounds present   Extremities: LE edematous, lymph edema, erythematous  Neuro Alert and oriented x3    Data Review:    Labs reviewed      Labs:     Recent Labs      10/08/17   0140  10/07/17   0611   WBC  7.0  8.0   HGB  11.5  12.4   HCT  37.9  40.4   PLT  186  192     Recent Labs      10/08/17   0140  10/07/17   0611  10/06/17   2151   NA  141  140  141   K  3.5  3.8  4.3   CL  100  102  103   CO2  32  29  29   BUN  14  12  13   CREA  1.38*  1.29*  1.25*   GLU  120*  90  106*   CA  8.6  8.8  8.7   MG  1.6  1.8   --    PHOS   --   3.0   --      Recent Labs      10/07/17   0611  10/06/17   2151   SGOT  17  17   ALT  13  15   AP  130*  135*   TBILI  1.6*  1.2*   TP  6.9  7.3   ALB  3.3*  3.4*   GLOB  3.6  3.9     Recent Labs      10/08/17   0140  10/06/17   2151   INR  2.3*  2.0*   PTP  23.8*  20.8*      No results for input(s): FE, TIBC, PSAT, FERR in the last 72 hours. No results found for: FOL, RBCF   No results for input(s): PH, PCO2, PO2 in the last 72 hours.   Recent Labs      10/07/17   0611  10/06/17   2151   CPK  58   --    CKNDX  2.6*   --    TROIQ  <0.04  <0.04     No results found for: CHOL, CHOLX, CHLST, CHOLV, HDL, LDL, LDLC, DLDLP, TGLX, TRIGL, TRIGP, CHHD, CHHDX  Lab Results   Component Value Date/Time    Glucose (POC) 98 10/07/2017 11:07 AM    Glucose (POC) 92 10/07/2017 06:50 AM    Glucose (POC) 169 12/05/2016 04:43 PM    Glucose (POC) 126 12/05/2016 12:15 PM    Glucose (POC) 112 12/05/2016 08:23 AM     Lab Results   Component Value Date/Time    Color DARK YELLOW 12/02/2016 05:46 PM    Appearance TURBID 12/02/2016 05:46 PM    Specific gravity 1.022 12/02/2016 05:46 PM    Specific gravity 1.015 03/16/2012 01:55 AM    pH (UA) 5.0 12/02/2016 05:46 PM    Protein 30 12/02/2016 05:46 PM    Glucose NEGATIVE  12/02/2016 05:46 PM    Ketone TRACE 12/02/2016 05:46 PM    Bilirubin NEGATIVE  07/08/2012 12:10 AM    Urobilinogen 1.0 12/02/2016 05:46 PM    Nitrites NEGATIVE  12/02/2016 05:46 PM    Leukocyte Esterase NEGATIVE  12/02/2016 05:46 PM    Epithelial cells MANY 12/02/2016 05:46 PM    Bacteria 1+ 12/02/2016 05:46 PM    WBC 0-4 12/02/2016 05:46 PM    RBC 0-5 12/02/2016 05:46 PM         Medications Reviewed:     Current Facility-Administered Medications   Medication Dose Route Frequency    metOLazone (ZAROXOLYN) tablet 5 mg  5 mg Oral DAILY    warfarin (COUMADIN) tablet 5 mg  5 mg Oral ONCE    nystatin (MYCOSTATIN) 100,000 unit/gram powder   Topical TID    allopurinol (ZYLOPRIM) tablet 100 mg  100 mg Oral DAILY    docusate (COLACE) 50 mg/5 mL oral liquid 50 mg  50 mg Oral BID PRN    gabapentin (NEURONTIN) capsule 400 mg  400 mg Oral QHS    guaiFENesin ER (MUCINEX) tablet 600 mg  600 mg Oral BID    lactobac ac& pc-s.therm-b.anim (PHI Q/RISAQUAD)  1 Cap Oral DAILY    levothyroxine (SYNTHROID) tablet 112 mcg  112 mcg Oral ACB    loratadine (CLARITIN) tablet 10 mg  10 mg Oral DAILY    pantoprazole (PROTONIX) tablet 40 mg  40 mg Oral DAILY    potassium chloride SR (KLOR-CON 10) tablet 20 mEq  20 mEq Oral BID    venlafaxine (EFFEXOR) tablet 75 mg  75 mg Oral BID    sodium chloride (NS) flush 5-10 mL  5-10 mL IntraVENous Q8H    sodium chloride (NS) flush 5-10 mL  5-10 mL IntraVENous PRN    ondansetron (ZOFRAN) injection 4 mg  4 mg IntraVENous Q4H PRN    albuterol-ipratropium (DUO-NEB) 2.5 MG-0.5 MG/3 ML  3 mL Nebulization Q4H PRN    carvedilol (COREG) tablet 12.5 mg  12.5 mg Oral BID WITH MEALS    bumetanide (BUMEX) injection 2 mg  2 mg IntraVENous BID    nitroglycerin (NITROBID) 2 % ointment 0.5 Inch  0.5 Inch Topical Q6H    losartan (COZAAR) tablet 12.5 mg  12.5 mg Oral DAILY    warfarin - pharmacy to dose   Other Rx Dosing/Monitoring    acetaminophen (TYLENOL) tablet 650 mg  650 mg Oral Q4H PRN     ______________________________________________________________________  EXPECTED LENGTH OF STAY: - - -  ACTUAL LENGTH OF STAY:          2                 Mayuri Chavez MD

## 2017-10-08 NOTE — PROGRESS NOTES
Kristine Jacobson Cardiology Consult         [facility], [address] [facility] [address] 200, [facility]   [phone] fax [phone]    Name: Mei Norton  1940 [id number]  10/8/2017 10:23 AM    Had 7 beat NSVT today. remains afib. Neg just one L after bumex, metolazone. Will repeat metolazone today. Assessment/Plan:  1. Acute on chronic diastolic CHF. NYHA Class IV on admission. Not able to do 6mwt. Not taking diuretics, fluid overloaded, edema. -ON Lasix 80IV, I/O 1L neg x 24h, will increase  -change to bumex, give metolazone today, add ntg and spironolactone  -change metoprolol to coreg  -proBNP 6383, trop neg  2. Morbid obesity Body mass index is 58.91 kg/(m^2). diet, weight loss  3. Afib not rate controlled, titrate up bblocker, optimize volume  -cont coumadin  -[id number]  4. CODP obestiy hypoventilation  5. HTN titrate agents today  6. CKD stage III  watch with diuresis  7. SOB multifactorial with obesity, copd, afib diastolic chf    gAdmit Date: 10/6/2017     Admit Diagnosis: Acute on chronic diastolic heart failure [facility])  Primary Care Physician:Eva Jackson MD     Attending Provider: Warden Shreya MD  CC/REASON FOR CONSULT:dCF    Subjective:     Mei Norton is a 69 y.o. female admitted for Acute on chronic diastolic heart failure ([facility] [address] Patient complains of  Increasing sob x 1 week. Does not walk. Increased edema, not able to say how much. Feels better after diuresis overnight. Not takign diuretics pta. bp uncontrolled, rate uncontrolled. Probably needs NH placement. Review of Symptoms:  A comprehensive review of systems was negative except for that written in the HPI.     Previous treatment/evaluation includes echocardiogram .  Cardiac risk factors: family history, dyslipidemia, diabetes mellitus, obesity, sedentary life style, hypertension, stress, post-menopausal.    Past Medical History:   Diagnosis Date    Arrhythmia     A Fib    Arthritis     Atrial fibrillation ([facility]     [facility] left [facility] cancer    Cardiomegaly     CKD (chronic kidney disease) stage 3, GFR 30-59 ml/min     judith    Clostridium difficile infection     Congestive heart failure, unspecified ([facility]     COPD     Diarrhea     /constipation    Dyspepsia and other specified disorders of function of stomach     Elevated antinuclear antibody (MARYAM) level     Endocrine disease     hypothyroidism    GERD (gastroesophageal reflux disease)     Hearing loss     Heart failure (HCC)     Hypertension     Hypertensive heart disease with heart failure (HCC)     diastolic dysfunction    Joint pain     Leg swelling     Long term (current) use of anticoagulants     Lymphedema     Morbid obesity (HCC)     Obesity, unspecified     Other ill-defined conditions     lymph edema    Pleural effusion     s/p VATS    Shortness of breath     Thyroid disease     Unspecified sleep apnea     Does not use machine     Past Surgical History:   Procedure Laterality Date    BREAST SURGERY PROCEDURE UNLISTED  13    LEFT BREAST MASTECTOMY SENTINEL NODE BIOPSY      HX BREAST BIOPSY      left    HX BREAST BIOPSY      fibroids removed right breast    HX  SECTION      times 2    HX KNEE ARTHROSCOPY      left knee     HX MODIFIED RADICAL MASTECTOMY  2013    BREAST MASTECTOMY MODIFIED RADICAL performed by Angi Tena MD at [facility] MAIN OR    HX OTHER SURGICAL      pleural effusion    HX RHINOPLASTY      HX DOMI AND BSO      HX TONSILLECTOMY       Current Facility-Administered Medications   Medication Dose Route Frequency    nystatin (MYCOSTATIN) 100,000 unit/gram powder   Topical TID    allopurinol (ZYLOPRIM) tablet 100 mg  100 mg Oral DAILY    docusate (COLACE) 50 mg/5 mL oral liquid 50 mg  50 mg Oral BID PRN    gabapentin (NEURONTIN) capsule 400 mg  400 mg Oral QHS    guaiFENesin ER (MUCINEX) tablet 600 mg  600 mg Oral BID    lactobac ac& pc-s.therm-b.anim (PHI Q/RISAQUAD)  1 Cap Oral DAILY    levothyroxine (SYNTHROID) tablet 112 mcg  112 mcg Oral ACB    loratadine (CLARITIN) tablet 10 mg  10 mg Oral DAILY    pantoprazole (PROTONIX) tablet 40 mg  40 mg Oral DAILY    potassium chloride SR (KLOR-CON 10) tablet 20 mEq  20 mEq Oral BID    venlafaxine (EFFEXOR) tablet 75 mg  75 mg Oral BID    sodium chloride (NS) flush 5-10 mL  5-10 mL IntraVENous Q8H    sodium chloride (NS) flush 5-10 mL  5-10 mL IntraVENous PRN    ondansetron (ZOFRAN) injection 4 mg  4 mg IntraVENous Q4H PRN    albuterol-ipratropium (DUO-NEB) 2.5 MG-0.5 MG/3 ML  3 mL Nebulization Q4H PRN    carvedilol (COREG) tablet 12.5 mg  12.5 mg Oral BID WITH MEALS    bumetanide (BUMEX) injection 2 mg  2 mg IntraVENous BID    nitroglycerin (NITROBID) 2 % ointment 0.5 Inch  0.5 Inch Topical Q6H    losartan (COZAAR) tablet 12.5 mg  12.5 mg Oral DAILY    warfarin - pharmacy to dose   Other Rx Dosing/Monitoring    acetaminophen (TYLENOL) tablet 650 mg  650 mg Oral Q4H PRN       Allergies   Allergen Reactions    Latex Rash    Grass Pollen-[place], Standard Unknown (comments)    Sulfa (Sulfonamide Antibiotics) Unknown (comments)      Family History   Problem Relation Age of Onset    Lung Disease Mother      Emphysema    Heart Disease Mother     Hypertension Mother     Cancer Father      Lung Cancer    Heart Disease Father     Hypertension Father     Cancer Paternal Aunt      colon      Social History     Social History    Marital status:      Spouse name: N/A    Number of children: N/A    Years of education: N/A     Social History Main Topics    Smoking status: Never Smoker    Smokeless tobacco: Never Used    Alcohol use No    Drug use: No    Sexual activity: Not on file     Other Topics Concern    Not on file     Social History Narrative    No narrative on file          Objective:      Physical Exam  Vitals:    10/08/17 0533 10/08/17 0753 10/08/17 0902 10/08/17 0920   BP: 103/57 136/72 144/54    Pulse: 90 96 (!) 108 Resp:  24     Temp:  98 °F (36.7 °C)     SpO2:  95%  93%   Weight:       Height:           General:  Alert, cooperative, no distress, appears stated age. Morbidly obese   Eyes:  Conjunctivae/corneas clear. PERRL, EOMs intact. Ears:  Normal external ear canals both ears. Nose:  No drainage or sinus tenderness. Mouth/Throat: Moist mucous membranes. Dentition fair   Neck: Symmetrical, trachea midline, no carotid bruit and no JVD. Back:   No CVA tenderness   Lungs:   Clear to auscultation bilaterally. Heart:  Regular rate and rhythm, S1, S2 normal, no murmur, click, rub or gallop. Abdomen:   Soft, non-tender. Bowel sounds normal. No masses,  No organomegaly. Extremities: Extremities  4+ BLE, brawny skin changes with scaling   Vascular: 2+ and symmetric UE/LE bilat   Skin: Chronic venous stasis changes. Lymph nodes: No Lymphadenopathy   Neurologic: CNII-XII intact. Weak, nonfocal       Telemetry: AFIB  ECG: atrial fibrillation, rate 110  Echocardiogram: Not done    Data Review:     Recent Labs      10/07/17   0611  10/06/17   2151   CPK  58   --    CKMB  1.5   --    TROIQ  <0.04  <0.04     Recent Labs      10/08/17   0140  10/07/17   0611   NA  141  140   K  3.5  3.8   CL  100  102   CO2  32  29   BUN  14  12   CREA  1.38*  1.29*   GLU  120*  90   PHOS   --   3.0   CA  8.6  8.8     Recent Labs      10/08/17   0140  10/07/17   0611   WBC  7.0  8.0   HGB  11.5  12.4   HCT  37.9  40.4   PLT  186  192     Recent Labs      10/08/17   0140  10/07/17   0611  10/06/17   2151   PTP  23.8*   --   20.8*   INR  2.3*   --   2.0*   SGOT   --   17 17   AP   --   130*  135*     No results for input(s): CHOL, LDLC in the last 72 hours. No lab exists for component: TGL, HDLC,  HBA1C  Recent Labs      10/07/17   0611   TSH  5.40*     Thank you very much for this referral. I appreciate the opportunity to participate in this patient's care.       Tamara Jain MD  CC:Eva Jackson MD

## 2017-10-09 ENCOUNTER — APPOINTMENT (OUTPATIENT)
Dept: GENERAL RADIOLOGY | Age: 77
DRG: 291 | End: 2017-10-09
Attending: NURSE PRACTITIONER
Payer: MEDICARE

## 2017-10-09 LAB
ANION GAP SERPL CALC-SCNC: 8 MMOL/L (ref 5–15)
BUN SERPL-MCNC: 13 MG/DL (ref 6–20)
BUN/CREAT SERPL: 9 (ref 12–20)
CALCIUM SERPL-MCNC: 8.4 MG/DL (ref 8.5–10.1)
CHLORIDE SERPL-SCNC: 95 MMOL/L (ref 97–108)
CO2 SERPL-SCNC: 34 MMOL/L (ref 21–32)
CREAT SERPL-MCNC: 1.44 MG/DL (ref 0.55–1.02)
GLUCOSE SERPL-MCNC: 105 MG/DL (ref 65–100)
INR PPP: 2.2 (ref 0.9–1.1)
MAGNESIUM SERPL-MCNC: 1.7 MG/DL (ref 1.6–2.4)
POTASSIUM SERPL-SCNC: 3.2 MMOL/L (ref 3.5–5.1)
PROTHROMBIN TIME: 22.5 SEC (ref 9–11.1)
SODIUM SERPL-SCNC: 137 MMOL/L (ref 136–145)

## 2017-10-09 PROCEDURE — 74011250637 HC RX REV CODE- 250/637: Performed by: NURSE PRACTITIONER

## 2017-10-09 PROCEDURE — G8987 SELF CARE CURRENT STATUS: HCPCS

## 2017-10-09 PROCEDURE — 85610 PROTHROMBIN TIME: CPT | Performed by: INTERNAL MEDICINE

## 2017-10-09 PROCEDURE — G8988 SELF CARE GOAL STATUS: HCPCS

## 2017-10-09 PROCEDURE — 74011250636 HC RX REV CODE- 250/636: Performed by: NURSE PRACTITIONER

## 2017-10-09 PROCEDURE — 77030011256 HC DRSG MEPILEX <16IN NO BORD MOLN -A

## 2017-10-09 PROCEDURE — 74011250637 HC RX REV CODE- 250/637: Performed by: INTERNAL MEDICINE

## 2017-10-09 PROCEDURE — 97530 THERAPEUTIC ACTIVITIES: CPT

## 2017-10-09 PROCEDURE — 94640 AIRWAY INHALATION TREATMENT: CPT

## 2017-10-09 PROCEDURE — 71010 XR CHEST PORT: CPT

## 2017-10-09 PROCEDURE — 65660000000 HC RM CCU STEPDOWN

## 2017-10-09 PROCEDURE — 74011000250 HC RX REV CODE- 250: Performed by: INTERNAL MEDICINE

## 2017-10-09 PROCEDURE — 80048 BASIC METABOLIC PNL TOTAL CA: CPT | Performed by: INTERNAL MEDICINE

## 2017-10-09 PROCEDURE — 97535 SELF CARE MNGMENT TRAINING: CPT

## 2017-10-09 PROCEDURE — 83735 ASSAY OF MAGNESIUM: CPT | Performed by: NURSE PRACTITIONER

## 2017-10-09 PROCEDURE — 97165 OT EVAL LOW COMPLEX 30 MIN: CPT

## 2017-10-09 PROCEDURE — 36415 COLL VENOUS BLD VENIPUNCTURE: CPT | Performed by: INTERNAL MEDICINE

## 2017-10-09 PROCEDURE — 97110 THERAPEUTIC EXERCISES: CPT

## 2017-10-09 RX ORDER — CARVEDILOL 12.5 MG/1
25 TABLET ORAL 2 TIMES DAILY WITH MEALS
Status: DISCONTINUED | OUTPATIENT
Start: 2017-10-09 | End: 2017-10-14

## 2017-10-09 RX ORDER — CARVEDILOL 12.5 MG/1
25 TABLET ORAL 2 TIMES DAILY WITH MEALS
Status: DISCONTINUED | OUTPATIENT
Start: 2017-10-09 | End: 2017-10-09

## 2017-10-09 RX ORDER — MAGNESIUM SULFATE HEPTAHYDRATE 40 MG/ML
2 INJECTION, SOLUTION INTRAVENOUS ONCE
Status: COMPLETED | OUTPATIENT
Start: 2017-10-09 | End: 2017-10-09

## 2017-10-09 RX ORDER — SPIRONOLACTONE 25 MG/1
25 TABLET ORAL DAILY
Status: DISCONTINUED | OUTPATIENT
Start: 2017-10-09 | End: 2017-10-16 | Stop reason: HOSPADM

## 2017-10-09 RX ORDER — POTASSIUM CHLORIDE 750 MG/1
20 TABLET, FILM COATED, EXTENDED RELEASE ORAL
Status: DISCONTINUED | OUTPATIENT
Start: 2017-10-09 | End: 2017-10-09

## 2017-10-09 RX ORDER — POTASSIUM CHLORIDE 750 MG/1
40 TABLET, FILM COATED, EXTENDED RELEASE ORAL 2 TIMES DAILY
Status: DISCONTINUED | OUTPATIENT
Start: 2017-10-09 | End: 2017-10-10

## 2017-10-09 RX ORDER — LOSARTAN POTASSIUM 25 MG/1
25 TABLET ORAL DAILY
Status: DISCONTINUED | OUTPATIENT
Start: 2017-10-09 | End: 2017-10-15

## 2017-10-09 RX ORDER — WARFARIN SODIUM 5 MG/1
5 TABLET ORAL ONCE
Status: COMPLETED | OUTPATIENT
Start: 2017-10-09 | End: 2017-10-09

## 2017-10-09 RX ORDER — ISOSORBIDE MONONITRATE 30 MG/1
30 TABLET, EXTENDED RELEASE ORAL DAILY
Status: DISCONTINUED | OUTPATIENT
Start: 2017-10-09 | End: 2017-10-16 | Stop reason: HOSPADM

## 2017-10-09 RX ORDER — POTASSIUM CHLORIDE 14.9 MG/ML
10 INJECTION INTRAVENOUS
Status: COMPLETED | OUTPATIENT
Start: 2017-10-09 | End: 2017-10-09

## 2017-10-09 RX ADMIN — POTASSIUM CHLORIDE 10 MEQ: 200 INJECTION, SOLUTION INTRAVENOUS at 09:00

## 2017-10-09 RX ADMIN — METOLAZONE 5 MG: 5 TABLET ORAL at 10:43

## 2017-10-09 RX ADMIN — CARVEDILOL 25 MG: 12.5 TABLET, FILM COATED ORAL at 11:11

## 2017-10-09 RX ADMIN — NYSTATIN: 100000 POWDER TOPICAL at 21:44

## 2017-10-09 RX ADMIN — POTASSIUM CHLORIDE 40 MEQ: 750 TABLET, FILM COATED, EXTENDED RELEASE ORAL at 11:10

## 2017-10-09 RX ADMIN — PANTOPRAZOLE SODIUM 40 MG: 40 TABLET, DELAYED RELEASE ORAL at 10:43

## 2017-10-09 RX ADMIN — LORATADINE 10 MG: 10 TABLET ORAL at 10:39

## 2017-10-09 RX ADMIN — BUMETANIDE 2 MG: 0.25 INJECTION INTRAMUSCULAR; INTRAVENOUS at 10:44

## 2017-10-09 RX ADMIN — POTASSIUM CHLORIDE 10 MEQ: 200 INJECTION, SOLUTION INTRAVENOUS at 10:00

## 2017-10-09 RX ADMIN — LEVOTHYROXINE SODIUM 112 MCG: 112 TABLET ORAL at 06:30

## 2017-10-09 RX ADMIN — NITROGLYCERIN 0.5 INCH: 20 OINTMENT TOPICAL at 06:29

## 2017-10-09 RX ADMIN — SPIRONOLACTONE 25 MG: 25 TABLET, FILM COATED ORAL at 10:40

## 2017-10-09 RX ADMIN — POTASSIUM CHLORIDE 40 MEQ: 750 TABLET, FILM COATED, EXTENDED RELEASE ORAL at 19:49

## 2017-10-09 RX ADMIN — IPRATROPIUM BROMIDE AND ALBUTEROL SULFATE 3 ML: .5; 3 SOLUTION RESPIRATORY (INHALATION) at 10:49

## 2017-10-09 RX ADMIN — CARVEDILOL 25 MG: 12.5 TABLET, FILM COATED ORAL at 19:52

## 2017-10-09 RX ADMIN — ISOSORBIDE MONONITRATE 30 MG: 30 TABLET ORAL at 11:11

## 2017-10-09 RX ADMIN — GABAPENTIN 400 MG: 400 CAPSULE ORAL at 21:44

## 2017-10-09 RX ADMIN — GUAIFENESIN 600 MG: 600 TABLET, EXTENDED RELEASE ORAL at 21:44

## 2017-10-09 RX ADMIN — Medication 10 ML: at 21:44

## 2017-10-09 RX ADMIN — VENLAFAXINE 75 MG: 37.5 TABLET ORAL at 10:39

## 2017-10-09 RX ADMIN — ALLOPURINOL 100 MG: 100 TABLET ORAL at 10:39

## 2017-10-09 RX ADMIN — NYSTATIN: 100000 POWDER TOPICAL at 10:46

## 2017-10-09 RX ADMIN — BUMETANIDE 2 MG: 0.25 INJECTION INTRAMUSCULAR; INTRAVENOUS at 19:49

## 2017-10-09 RX ADMIN — Medication 10 ML: at 17:57

## 2017-10-09 RX ADMIN — Medication 10 ML: at 06:30

## 2017-10-09 RX ADMIN — LOSARTAN POTASSIUM 25 MG: 25 TABLET ORAL at 10:39

## 2017-10-09 RX ADMIN — WARFARIN SODIUM 5 MG: 5 TABLET ORAL at 17:57

## 2017-10-09 RX ADMIN — GUAIFENESIN 600 MG: 600 TABLET, EXTENDED RELEASE ORAL at 10:39

## 2017-10-09 RX ADMIN — NYSTATIN: 100000 POWDER TOPICAL at 17:54

## 2017-10-09 RX ADMIN — Medication 1 CAPSULE: at 10:39

## 2017-10-09 RX ADMIN — MAGNESIUM SULFATE HEPTAHYDRATE 2 G: 40 INJECTION, SOLUTION INTRAVENOUS at 11:12

## 2017-10-09 RX ADMIN — VENLAFAXINE 75 MG: 37.5 TABLET ORAL at 19:49

## 2017-10-09 NOTE — PROGRESS NOTES
Problem: Discharge Planning  Goal: *Discharge to safe environment  Outcome: Progressing Towards Goal  See cm notes.  BRANDON Mark

## 2017-10-09 NOTE — NURSE NAVIGATOR
Chart reviewed by Heart Failure Nurse Navigator. Heart Failure database completed. EF 45%. ACEi/ARB: losartan 25 mg daily. BB: coreg 25 mg, twice daily. CRT not currently indicated. NYHA Functional Class IV, on admission. Heart Failure Teach Back in Patient Education. Heart Failure Avoiding Triggers on Discharge Instructions. Cardiologist:  Dr. Nanda Nelson ([facility])    KAMRON Powers ([facility]) notified of admission.

## 2017-10-09 NOTE — PROGRESS NOTES
0730: Bedside shift change report given to Armstead Bernheim  (oncoming nurse) by Franca Das, RN  (offgoing nurse). Report included the following information SBAR, Kardex, MAR, Accordion and Recent Results. Patient had uneventful shift, no complaints of pain. 1930: Bedside shift change report given to El Morales, [address] [place] [address]  (oncoming nurse) by Armstead Bernheim  (offgoing nurse). Report included the following information SBAR, Kardex, MAR, Accordion and Recent Results.

## 2017-10-09 NOTE — PROGRESS NOTES
Problem: Patient Education: Go to Patient Education Activity  Goal: Patient/Family Education      NUTRITION      Completed  Heart Healthy diet instruction with patient and spouse. Patient's  now does all the cooking and frequently brings home takeout for dinner. Encouraged couple to endeavor to menu plan, cook more at home and to try to keep meals as simple as possible. Discussed what makes a balanced meal that incorporates lean protein, starch and vegetables. Discussed ways to limit saturated fats and trans fats; limit total cholesterol; limit amount of sodium to less than 2,000 milligrams per day; include more heart healthy fats; and include more dietary fiber daily by eating fruits, vegetables, whole grains and dried beans. Provided informational handouts to take home and answered all questions fully.         [facility] [place]

## 2017-10-09 NOTE — PROGRESS NOTES
Pharmacist Note  Warfarin Dosing  Consult provided for this 69 y. o.female to manage warfarin for h/o Atrial Fibrillation. INR Goal: 2 - 3    Home regimen/ tablet size: 2.5 mg on Fsh-Aya-Ufqvn-Sat; 5 mg on Tues-Fri-Sun    Drugs that may increase INR:  No major interactions  Drugs that may decrease INR: None  Other current anticoagulants/ drugs that may increase bleeding risk: none  Risk factors: Age > 65, Acute on chronic diastolic CHF  Daily INR ordered: YES    Recent Labs      10/09/17   0407  10/08/17   2007  10/08/17   0140  10/07/17   0611  10/06/17   2151   HGB   --   12.1  11.5  12.4  13.0   INR  2.2*   --   2.3*   --   2.0*     Date               INR                  Dose  10/06            2.0      --   10/07  --  7.5 mg    10/08              2.3                    5 mg  10/09              2.2                    5 mg                                                                               Assessment/ Plan: Will order warfarin 5mg po x1 today. Pharmacy will continue to monitor daily and adjust therapy as indicated.

## 2017-10-09 NOTE — WOUND CARE
Wound Care Note:     New consult placed by physician request for lymphedema    Chart shows:  Admitted for acute on chronic diastolic heart failure  Past Medical History:   Diagnosis Date    Arrhythmia     A Fib    Arthritis     Atrial fibrillation (HCC)     Cancer (HCC)     left breast cancer    Cardiomegaly     CKD (chronic kidney disease) stage 3, GFR 30-59 ml/min     judith    Clostridium difficile infection     [facility]     COPD     Diarrhea     /constipation    Dyspepsia and other specified disorders of function of stomach     Elevated antinuclear antibody (MARYAM) level     Endocrine disease     hypothyroidism    GERD (gastroesophageal reflux disease)     Hearing loss     Heart failure ([facility] [address]     Hypertension     Hypertensive heart disease with heart failure (HCC)     diastolic dysfunction    Joint pain     Leg swelling     Long term (current) use of anticoagulants     Lymphedema     Morbid obesity (HCC)     Obesity, unspecified     Other ill-defined conditions     lymph edema    Pleural effusion     s/p VATS    Shortness of breath     Thyroid disease     Unspecified sleep apnea     Does not use machine     WBC = 7.0 on 10/8/17  Admitted from home    Assessment:   Patient is A&O x 4, communicative, incontinent with maximum assistance needed in repositioning. Bed: Total Care Bariatric; Compella bed ordered  Patient has a Pure Wick. Diet: Cardiac  Patient reports no pain    Bilateral heels, buttocks, and sacral skin intact and with blanchable erythema. Left lateral ankle where lower leg hangs over had some blanchable erythema. 1. POA left proximal lower leg wound measures 0.4 cm x 0.5 cm x 0.1 cm, wound bed is pink, scant sero/sang drainage, juliana-wound is edematous with nodules consistent with lymphedema.     2.  POA left distal lower leg wounds is an area with two small wounds, area measures 2.7 cm x 2 cm x 0.1 cm, wound bed is pink, scant sero/sang drainage, juliana-wound is edematous with nodules consistent with lymphedema. 3.  POA posterior left thigh has a wound created when she was picked up using a holger lift at another facility, wound is linear measuring 0.5 cm x 10 cm x 0.1 cm, wound bed is pink, no drainage noted. Spoke with Dr. Bessy Meek; notified her of wounds, orders obtained. Patient's upper posterior thighs are hyperpigmented. Patient repositioned supine. Heels offloaded on pillows. Recommendations:    Left lower leg wounds- Every other day on ODD days, cleanse with normal saline, wipe wound bed clean and dry, apply a piece of Xeroform gauze that has been folded in half, cover with foam dressing. Moisturize leg with Aloe Vesta after foam dressing applied. Left posterior thigh wound- Every 12 hours cleanse with soap and water, blot dry, apply a thick coat of Sensicare Protective Barrier with zinc oxide. Aloe Vesta to bilateral lower legs every 12 hours    Specialty bed: Compella ordered via [facility]. Use only flat sheet and one incontinence pad. Please call [facility] if not delivered. Skin Care & Pressure Prevention:  Minimize layers of linen/pads under patient to optimize support surface. Turn/reposition approximately every 2 hours and offload heels.   Manage incontinence / promote continence     Discussed above plan with patient &  HUDSON Carvajal    Transition of Care: Plan to follow as needed while admitted to hospital.    Jero Gutierrez RN, BSN, Wound Care Nurse  office [phone]  pager [phone] or call  to page

## 2017-10-09 NOTE — PROGRESS NOTES
and progressing toward goals  [ ]    Not making progress toward goals and plan of care will be adjusted       PLAN:  Patient continues to benefit from skilled intervention to address the above impairments. Continue treatment per established plan of care. Discharge Recommendations:  Skilled Nursing Facility  Further Equipment Recommendations for Discharge:  None needed       SUBJECTIVE:   Patient stated Once I start going you are not going to be able to stop me from sliding, no way ever.       OBJECTIVE DATA SUMMARY:   Critical Behavior:  Neurologic State: Alert  Orientation Level: Oriented X4  Cognition: Appropriate decision making, Appropriate for age attention/concentration, Appropriate safety awareness, Follows commands  Safety/Judgement: Awareness of environment, Good awareness of safety precautions  Functional Mobility Training:  Bed Mobility:  Rolling: Total assistance (education provided; physical A)  Supine to Sit: Moderate assistance;Assist x2; Additional time;Maximum assistance (Max A only d/t pt's size)  Sit to Supine: Maximum assistance;Assist x2 (bringing B LEs onto bed)  Scooting: Moderate assistance; Additional time        Transfers:                                   Balance:  Sitting: Intact; With support  Ambulation/Gait Training:                                               Therapeutic Exercises:   Nleknb14 reps bilateral, Chair pushups(in bed)x 15 reps,               Activity Tolerance:   Good  Please refer to the flowsheet for vital signs taken during this treatment.   After treatment:   [ ]    Patient left in no apparent distress sitting up in chair  [X]    Patient left in no apparent distress in bed  [X]    Call bell left within reach  [X]    Nursing notified  [ ]    Caregiver present  [ ]    Bed alarm activated      COMMUNICATION/COLLABORATION:   The patients plan of care was discussed with: Registered Nurse     Regina Sandoval, PT   Time Calculation: 41 mins

## 2017-10-09 NOTE — PROGRESS NOTES
1930: Bedside and Verbal shift change report given to Roberto Langston RN (oncoming nurse) by Luis Miguel Mcintyre RN (offgoing nurse). Report included the following information SBAR, Kardex, Intake/Output, MAR, Recent Results and Cardiac Rhythm AFib.   0630: Partial bath completed, nystatin powder applied. Purewick, tubing and canister changed. 0730: Bedside and Verbal shift change report given to HUDSON Carvajal (oncoming nurse) by Roberto Langston RN (offgoing nurse). Report included the following information SBAR, Kardex, Intake/Output, MAR, Recent Results and Cardiac Rhythm Afib    Problem: Pressure Injury - Risk of  Goal: *Prevention of pressure ulcer  Outcome: Progressing Towards Goal    10/08/17 2015   Wound Prevention and Protection Methods   Orientation of Wound Prevention Posterior   Location of Wound Prevention Sacrum/Coccyx   Dressing Present  No   Read Only, Retired: Wound Treatment (non-mechanical)   Wound Offloading (Prevention Methods) Bed, pressure redistribution/air;Bed, pressure reduction mattress;Pillows;Repositioning;Turning         Problem: Falls - Risk of  Goal: *Absence of Falls  Document Jacob Fall Risk and appropriate interventions in the flowsheet.    Outcome: Progressing Towards Goal  Fall Risk Interventions:              Medication Interventions: Evaluate medications/consider consulting pharmacy, Patient to call before getting OOB, Teach patient to arise slowly     Elimination Interventions: Call light in reach, Toileting schedule/hourly rounds     History of Falls Interventions: Consult care management for discharge planning, Door open when patient unattended, Evaluate medications/consider consulting pharmacy, Investigate reason for fall, Room close to nurse's station

## 2017-10-09 NOTE — PROGRESS NOTES
Speech pathology  Consult received for SLP speech bedside screen. Note RN documented part of nursing dysphagia screen, however PO challenge not completed. Patient placed on regular diet. Also note order placed per protocol, and SLP requires physician's order. If MD desires formal SLP evaluation, please re-consult with physician's order for SLP eval and treat as SLP does not complete \"screenings\" only evaluations or treatments. Thank you.     Gabriele Wilson, Margie Fitzpatrick., [facility]-SLP

## 2017-10-09 NOTE — PROGRESS NOTES
Patient reviewed chart - see recent CM ED note from [facility] on 10/03/2017- patient admitted for SOB and Chronic Diastolic HF - lives with  - has a motorized w/c- ramp, potty chair that is now too small for patient, and a rollator she has not been using - gets meds from [facility] [phone]- previous referral made to [facility] however patient was readmitted to hospital before patient was opened for home care services- patient is agreeable to rehab - has requested [facility] re[facility] and CM explained patient would need to be able to tolerate 3 hours of rehab a day - will ask [facility] to screen patient - awaiting OT notes - patient left with SNF rehab facility list and asked to choose top 3 choices of facilities and encouraged her to share with her  and she stated she would prefer to have liasons from facilities see her because her  and her may not agree on choices of facilities - when questioning about finances and need for medicaid LTC - patient stated her  would be the one to ask- CM will await input from [address] [place] [address] screening at this time. BRANDON Zuniga       CM received a call from Galo Mahajan with [facility] and patient does not meet criteria for IP rehab (inabilty to tolerate 3 hours of therapy a day). Will need to pursue SNF placement for rehab. BRANDON Zuniga       Nursing staff with patient at this time - met with  outside of room - explained that patient does not meet criteria for [facility] at this time - made  aware of listing left at bedside for patient to consider-  made aware of option of medicaid LTC screening that CM would need to complete if LTC medicaid is needed within 6 months then [facility] would need to be completed-  indicated would not need medicaid at this time. BRANDON Zuniga        Care Management Interventions  PCP Verified by CM:  Yes (Dr Lee Mcneil)  Transition of Care Consult (CM Consult): SNF (Rehab vs SNF - SNF list provided)  MyChart Signup: No  Discharge Durable Medical Equipment: No  Physical Therapy Consult: Yes  Occupational Therapy Consult: Yes  Speech Therapy Consult: No  Current Support Network: Lives with Spouse, Own Home  Confirm Follow Up Transport: Family  Discharge Location  Discharge Placement: Skilled nursing facility (inpt rehab vs SNF)

## 2017-10-09 NOTE — CARDIO/PULMONARY
Cardiac Wellness: Heart Failure education folder given to Gogo Garza. Met with the pt. and her  who is involved in the education. 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 She has been chair bound for several months due to bilateral lymphedema and unable to check daily weights. Educated using teach back method. Discussed diagnosis definition and assessed patient understanding. Reviewed importance of daily weight monitoring and low sodium diet (less than 1500 mg. Daily). Encouraged to keep a food diary to monitor sodium intake at home and to read food labels. She eats Heart Healthy soup but the sodium content is 450mg per 1/2 cup. Encouraged activity and rest periods within symptom limitations and as ordered by physician. Reviewed coreg, purpose of medication, potential side effects and what to do if dose is missed. Discussed importance of reporting signs and symptoms of exacerbation and when to report them to the doctor to prevent re-hospitalization. Gogo Garza was encouraged to keep all appointments with doctor. HF teach back questions answered by patient. Gogo Garza is not a candidate for Cardiac Rehab due to immobility issues and morbid obesity limitations.         Mireya Goodman RN

## 2017-10-09 NOTE — PROGRESS NOTES
Problem: Self Care Deficits Care Plan (Adult)  Goal: *Acute Goals and Plan of Care (Insert Text)  Occupational Therapy Goals  Initiated 10/9/2017  1. Patient will perform sitting unsupported 1 min during upper body ADLs with maximal assistance within 7 day(s). 2. Patient will perform bathing upper body to thighs with setup within 7 day(s). 3. Patient will perform rolling in bed toilet transfers with maximal assistance within 7 day(s). 4. Patient will perform all aspects of toileting with maximal assistance within 7 day(s). 5. Patient will participate in upper extremity therapeutic exercise/activities with light resistance supervision/set-up for 5 minutes within 7 day(s). OCCUPATIONAL THERAPY EVALUATION  Patient: Marci Gavin (53 y.o. female)  Date: 10/9/2017  Primary Diagnosis: Acute on chronic diastolic heart failure ([facility] [address]        Precautions:   Fall      ASSESSMENT :  Based on the objective data described below, the patient presents with modified independence to max A upper body ADLs, lower body ADLs total A, bed mobility total A. ADLs limited by arthritis throughout but with pain L knee > R, sitting balance poor, girth, lymphedema B LEs (can not wear wraps until back out of the hospital), urinary incontinence, on diuretic, cardiopulmonary tolerance (3L, -130). Deaf R ear and [place] L. Patient stating maybe able to go home if has hospital bed. Instruction on benefits of rehab to increase independence, safety, will need new w/c measured, BSC, and strengthening. Patient will benefit from skilled intervention to address the above impairments.   Patients rehabilitation potential is considered to be Fair  Factors which may influence rehabilitation potential include:   [X]             None noted  [ ]             Mental ability/status  [ ]             Medical condition  [ ]             Home/family situation and support systems  [ ]             Safety awareness  [ ]             Pain tolerance/management  [ ] Other: PLAN :  Recommendations and Planned Interventions:  [X]               Self Care Training                  [X]        Therapeutic Activities  [X]               Functional Mobility Training    [ ]        Cognitive Retraining  [X]               Therapeutic Exercises           [X]        Endurance Activities  [X]               Balance Training                   [ ]        Neuromuscular Re-Education  [ ]               Visual/Perceptual Training     [X]   Home Safety Training  [X]               Patient Education                 [X]        Family Training/Education  [ ]               Other (comment):     Frequency/Duration: Patient will be followed by occupational therapy 3 times a week to address goals. Discharge Recommendations: Rehab  Further Equipment Recommendations for Discharge: TBD       SUBJECTIVE:   Patient stated Tamar Gastelum am so big.       OBJECTIVE DATA SUMMARY:   HISTORY:   Past Medical History:   Diagnosis Date    Arrhythmia       A Fib    Arthritis      Atrial fibrillation (HCC)      Cancer (HCC)       left breast cancer    Cardiomegaly      CKD (chronic kidney disease) stage 3, GFR 30-59 ml/min       judith    Clostridium difficile infection      Congestive heart failure, unspecified ([facility] [address]      COPD      Diarrhea       /constipation    Dyspepsia and other specified disorders of function of stomach      Elevated antinuclear antibody (MARYAM) level      Endocrine disease       hypothyroidism    GERD (gastroesophageal reflux disease)      Hearing loss      Heart failure (HCC)      Hypertension      Hypertensive heart disease with heart failure (HCC)       diastolic dysfunction    Joint pain      Leg swelling      Long term (current) use of anticoagulants      Lymphedema      Morbid obesity (HCC)      Obesity, unspecified      Other ill-defined conditions       lymph edema    Pleural effusion       s/p VATS    Shortness of breath      Thyroid disease      Unspecified sleep apnea       Does not use machine     Past Surgical History:   Procedure Laterality Date    BREAST SURGERY PROCEDURE UNLISTED   13     LEFT BREAST MASTECTOMY SENTINEL NODE BIOPSY      HX BREAST BIOPSY         left    HX BREAST BIOPSY         fibroids removed right breast    HX  SECTION         times 2    HX KNEE ARTHROSCOPY         left knee     HX MODIFIED RADICAL MASTECTOMY   2013     BREAST MASTECTOMY MODIFIED RADICAL performed by B[facility] at [facility] MAIN OR    HX OTHER SURGICAL         pleural effusion    HX RHINOPLASTY        HX DOMI AND BSO        HX TONSILLECTOMY            Prior Level of Function/Home Situation: Patient completes recliner<>BSC<>w/c with A from spouse, unable to always feel when she needs to urinate until it is too late, does not always make it to the [facility] in time, w/c can not fit in any of the doors, patient does not walk because Rollator no longer safe at this time, patient performs all ADLs at recliner/BSC/wheelchair level, spouse brings patient meals, last lymphedema appointment was April here at [facility] (can not wear wraps while in the hospital for cardiopulmonary issues and will need to go back to lymph clinic s/p hospitalization to restart program/re-measure for accuracy of garments fitting). Expanded or extensive additional review of patient history:      Home Situation  Home Environment: Private residence  # Steps to Enter: 0  One/Two Story Residence: One story  Living Alone: No  Support Systems: Spouse/Significant Other/Partner, Family member(s)  Patient Expects to be Discharged to[de-identified] Rehabilitation facility  Current DME Used/Available at Home: Com[employer], bedside  [X]  Right hand dominant             [ ]  Left hand dominant     EXAMINATION OF PERFORMANCE DEFICITS:  Cognitive/Behavioral Status:  Neurologic State: Alert  Orientation Level: Oriented X4  Cognition: Appropriate decision making; Appropriate for age attention/concentration; Appropriate safety awareness; Follows commands  Perception: Appears intact  Perseveration: No perseveration noted  Safety/Judgement: Awareness of environment;Good awareness of safety precautions     Skin: bubbled, red and purple B LEs     Edema: poor - hard lymphedema B LEs     Hearing: Auditory  Auditory Impairment: Hard of hearing, bilateral (L ear better than R)  Hearing Aids/Status: At home     Vision/Perceptual:                           Acuity: Impaired near vision    Corrective Lenses: Reading glasses     Range of Motion:     AROM: Generally decreased, functional (shoulder flexion 120*, elbows-digits WWDL; LEs poor)                          Strength:     Strength: Generally decreased, functional (4/5 B UEs; LEs poor)                 Coordination:  Coordination: Within functional limits (B UEs WFL; B LEs poor)  Fine Motor Skills-Upper: Left Intact; Right Intact    Gross Motor Skills-Upper: Left Intact; Right Intact     Tone & Sensation:     Tone: Normal  Sensation: Impaired (poor B LEs)                       Balance:  Sitting: Intact; With support (B hands pulling self from bed/chair position, bed rail use)     Functional Mobility and Transfers for ADLs:  Bed Mobility:  Rolling: Total assistance (education provided; physical A LE and buttock when rolling to L and then R, patient utilizing bed rail). Scooting: Total assistance (trendelenburg; physical A)  Patient instruction on benefits bed/chair position. Patient tolerated but she is not sure how long she can remain. Attempted with foot board off but not safe with one person only. Transfers: Toilet Transfer : Total assistance  Tub Transfer: Total assistance  Shower Transfer:  Total assistance     ADL Assessment:  Feeding: Modified independent     Oral Facial Hygiene/Grooming: Supervision (setup on beside tray, sitting up in bed)     Bathing: Maximum assistance (able to complete upper body with setup)     Upper Body Dressing: Maximum assistance (gown)     Lower Body Dressing: Total assistance     Toileting: Total assistance ([place], brief)                 ADL Intervention and task modifications:                                            Cognitive Retraining  Safety/Judgement: Awareness of environment;Good awareness of safety precautions     Therapeutic Exercise:     Functional Measure:  Barthel Index:      Bathin  Bladder: 0  Bowels: 0  Groomin  Dressin  Feeding: 10  Mobility: 0  Stairs: 0  Toilet Use: 0  Transfer (Bed to Chair and Back): 0  Total: 15         Barthel and G-code impairment scale:  Percentage of impairment CH  0% CI  1-19% CJ  20-39% CK  40-59% CL  60-79% CM  80-99% CN  100%   Barthel Score 0-100 100 99-80 79-60 59-40 20-39 1-19    0   Barthel Score 0-20 20 17-19 13-16 9-12 5-8 1-4 0      The Barthel ADL Index: Guidelines  1. The index should be used as a record of what a patient does, not as a record of what a patient could do. 2. The main aim is to establish degree of independence from any help, physical or verbal, however minor and for whatever reason. 3. The need for supervision renders the patient not independent. 4. A patient's performance should be established using the best available evidence. Asking the patient, friends/relatives and nurses are the usual sources, but direct observation and common sense are also important. However direct testing is not needed. 5. Usually the patient's performance over the preceding 24-48 hours is important, but occasionally longer periods will be relevant. 6. Middle categories imply that the patient supplies over 50 per cent of the effort. 7. Use of aids to be independent is allowed. Harlee Cowden., Barthel, D.W. (1604). Functional evaluation: the Barthel Index. [facility] (14)2. KARLA Wilks, Asad Kerr, Noam Melgar, Mark, [address] [place] [address] ().  Measuring the change indisability after inpatient rehabilitation; comparison of the responsiveness of the Barthel Index and Functional [place] Measure. Journal of Neurology, Neurosurgery, and Psychiatry, 66(4), 701-350. Sharon Marie, N.J.A, EFRAÍN Reddy, & Meena Dickey M.A. (2004.) Assessment of post-stroke quality of life in cost-effectiveness studies: The usefulness of the Barthel Index and the EuroQoL-5D. Quality of Life Research, 13, 423-60            G codes: In compliance with CMSs Claims Based Outcome Reporting, the following G-code set was chosen for this patient based on their primary functional limitation being treated: The outcome measure chosen to determine the severity of the functional limitation was the Barthel INdex with a score of 15/100 which was correlated with the impairment scale. · Self Care:               - CURRENT STATUS:    CM - 80%-99% impaired, limited or restricted               - GOAL STATUS:           CK - 40%-59% impaired, limited or restricted               - D/C STATUS:                       ---------------To be determined---------------        Pain:  Pain Scale 1: Numeric (0 - 10)  Pain Intensity 1: 0              Activity Tolerance:   -130  Please refer to the flowsheet for vital signs taken during this treatment. After treatment:   [X] Patient left in no apparent distress sitting up in bed/chair position  [ ] Patient left in no apparent distress in bed  [X] Call bell left within reach  [X] Nursing notified  [ ] Caregiver present  [ ] Bed alarm activated      COMMUNICATION/EDUCATION:   The patients plan of care was discussed with: Physical Therapist and Registered Nurse.  [X] Home safety education was provided and the patient/caregiver indicated understanding. [X] Patient/family have participated as able in goal setting and plan of care. [X] Patient/family agree to work toward stated goals and plan of care. [ ] Patient understands intent and goals of therapy, but is neutral about his/her participation.   [ ] Patient is unable to participate in goal setting and plan of care. This patients plan of care is appropriate for delegation to [facility].      Thank you for this referral.  Anamika Mclaughlin  Time Calculation: 37 mins

## 2017-10-09 NOTE — PROGRESS NOTES
Hospitalist Progress Note  Bhavana Alvarez MD  Office: [phone]        Date of Service:  10/9/2017  NAME:  Thomas Bond  :  1940  MRN:  [id number]      Admission Summary:   78-year-old woman with a past medical history significant for bilateral lower extremity edema, atrialfibrillation, obstructive sleep apnea, COPD, hypothyroidism, hypertension, depression, chronic kidney disease stage 3, morbid obesity, admitted for acute on chronic diastolic HF. Interval history / Subjective:   Denies chest pain, but feels little wheezy and sob, received nebs and CXR. Assessment & Plan:     Acute on chronic diastolic CHF: Diuresis as per cardiology. On Bumex and metolazone. On Coreg, Losartan, Imdur, spironolactone. Left mod pleural effusion on CXR: CXR this AM little improvement. May need CT chest after complete diuresis (H/O VATS)     A.fib: on coumadin and coreg    Sleep apnea: pt intolerant of BiPAP or CPAP, overnight pulse ox.     Morbid obesity: discussed a about healthy lifestyle modification and comorbidities associated with it.     CKD-3 : monitor renal function with diuresis. monitor electrolytes and replace as needed. Hypokalemia and hypo magnesemia: replaced    Wound care for LE lymphedema with small ulcer.     COPD: nebs and home meds. HTN coreg, losartan  DVT PPX on coumadin. Care Plan discussed with: patient  Disposition: rehab after diuresis     Hospital Problems  Date Reviewed: 10/7/2017          Codes Class Noted POA    * (Principal)Acute on chronic diastolic heart failure ([facility] [address] ICD-10-CM: I50.33  ICD-9-CM: 428.33  10/6/2017 Yes                Review of Systems:   No chest pain, head ache, dizziness. Vital Signs:    Last 24hrs VS reviewed since prior progress note.  Most recent are:  Visit Vitals    /61 (BP 1 Location: Right arm, BP Patient Position: At rest)    Pulse (!) 120    Temp 97.7 °F (36.5 °C)    Resp 20    Ht 5' 9\" (1.753 m)    Wt (!) 180.1 kg (397 lb)    SpO2 98%    Breastfeeding No    BMI 58.63 kg/m2         Intake/Output Summary (Last 24 hours) at 10/09/17 1759  Last data filed at 10/09/17 1354   Gross per 24 hour   Intake             1300 ml   Output             3000 ml   Net            -1700 ml        Physical Examination:       Gen: Not in acute distress  HEENT: atraumatic, normocephalic, moist mucous membranes  CVS: S1, S2 with in normal limits, tachy  RS: wheezing positive  GI : obese, NT, ND bowel sounds present   Extremities: LE edematous, lymph edema, erythematous, weeping  Neuro Alert and oriented x3    Data Review:    Labs reviewed      Labs:     Recent Labs      10/08/17   2007  10/08/17   0140   WBC  7.0  7.0   HGB  12.1  11.5   HCT  38.7  37.9   PLT  202  186     Recent Labs      10/09/17   0407  10/08/17   0140  10/07/17   0611   NA  137  141  140   K  3.2*  3.5  3.8   CL  95*  100  102   CO2  34*  32  29   BUN  13  14  12   CREA  1.44*  1.38*  1.29*   GLU  105*  120*  90   CA  8.4*  8.6  8.8   MG  1.7  1.6  1.8   PHOS   --    --   3.0     Recent Labs      10/07/17   0611  10/06/17   2151   SGOT  17  17   ALT  13  15   AP  130*  135*   TBILI  1.6*  1.2*   TP  6.9  7.3   ALB  3.3*  3.4*   GLOB  3.6  3.9     Recent Labs      10/09/17   0407  10/08/17   0140  10/06/17   2151   INR  2.2*  2.3*  2.0*   PTP  22.5*  23.8*  20.8*      No results for input(s): FE, TIBC, PSAT, FERR in the last 72 hours. No results found for: FOL, RBCF   No results for input(s): PH, PCO2, PO2 in the last 72 hours.   Recent Labs      10/07/17   0611  10/06/17   2151   CPK  58   --    CKNDX  2.6*   --    TROIQ  <0.04  <0.04     No results found for: CHOL, CHOLX, CHLST, CHOLV, HDL, LDL, LDLC, DLDLP, TGLX, TRIGL, TRIGP, CHHD, CHHDX  Lab Results   Component Value Date/Time    Glucose (POC) 98 10/07/2017 11:07 AM    Glucose (POC) 92 10/07/2017 06:50 AM    Glucose (POC) 169 12/05/2016 04:43 PM    Glucose (POC) 126 12/05/2016 12:15 PM    Glucose (POC) 112 12/05/2016 08:23 AM     Lab Results   Component Value Date/Time    Color DARK YELLOW 12/02/2016 05:46 PM    Appearance TURBID 12/02/2016 05:46 PM    Specific gravity 1.022 12/02/2016 05:46 PM    Specific gravity 1.015 03/16/2012 01:55 AM    pH (UA) 5.0 12/02/2016 05:46 PM    Protein 30 12/02/2016 05:46 PM    Glucose NEGATIVE  12/02/2016 05:46 PM    Ketone TRACE 12/02/2016 05:46 PM    Bilirubin NEGATIVE  07/08/2012 12:10 AM    Urobilinogen 1.0 12/02/2016 05:46 PM    Nitrites NEGATIVE  12/02/2016 05:46 PM    Leukocyte Esterase NEGATIVE  12/02/2016 05:46 PM    Epithelial cells MANY 12/02/2016 05:46 PM    Bacteria 1+ 12/02/2016 05:46 PM    WBC 0-4 12/02/2016 05:46 PM    RBC 0-5 12/02/2016 05:46 PM         Medications Reviewed:     Current Facility-Administered Medications   Medication Dose Route Frequency    spironolactone (ALDACTONE) tablet 25 mg  25 mg Oral DAILY    isosorbide mononitrate ER (IMDUR) tablet 30 mg  30 mg Oral DAILY    potassium chloride SR (KLOR-CON 10) tablet 40 mEq  40 mEq Oral BID    losartan (COZAAR) tablet 25 mg  25 mg Oral DAILY    influenza vaccine 2017-18 (3 yrs+)(PF) (FLUZONE QUAD/FLUARIX QUAD) injection 0.5 mL  0.5 mL IntraMUSCular PRIOR TO DISCHARGE    carvedilol (COREG) tablet 25 mg  25 mg Oral BID WITH MEALS    metOLazone (ZAROXOLYN) tablet 5 mg  5 mg Oral DAILY    nystatin (MYCOSTATIN) 100,000 unit/gram powder   Topical TID    allopurinol (ZYLOPRIM) tablet 100 mg  100 mg Oral DAILY    docusate (COLACE) 50 mg/5 mL oral liquid 50 mg  50 mg Oral BID PRN    gabapentin (NEURONTIN) capsule 400 mg  400 mg Oral QHS    guaiFENesin ER (MUCINEX) tablet 600 mg  600 mg Oral BID    lactobac ac& pc-s.therm-b.anim (PHI Q/RISAQUAD)  1 Cap Oral DAILY    levothyroxine (SYNTHROID) tablet 112 mcg  112 mcg Oral ACB    loratadine (CLARITIN) tablet 10 mg  10 mg Oral DAILY    pantoprazole (PROTONIX) tablet 40 mg  40 mg Oral DAILY    venlafaxine (EFFEXOR) tablet 75 mg  75 mg Oral BID    sodium chloride (NS) flush 5-10 mL  5-10 mL IntraVENous Q8H    sodium chloride (NS) flush 5-10 mL  5-10 mL IntraVENous PRN    ondansetron (ZOFRAN) injection 4 mg  4 mg IntraVENous Q4H PRN    albuterol-ipratropium (DUO-NEB) 2.5 MG-0.5 MG/3 ML  3 mL Nebulization Q4H PRN    bumetanide (BUMEX) injection 2 mg  2 mg IntraVENous BID    warfarin - pharmacy to dose   Other Rx Dosing/Monitoring    acetaminophen (TYLENOL) tablet 650 mg  650 mg Oral Q4H PRN     ______________________________________________________________________  EXPECTED LENGTH OF STAY: 4d 12h  ACTUAL LENGTH OF STAY:          3                 Lidya Perez MD

## 2017-10-09 NOTE — PROGRESS NOTES
[facility] Daily Progress Note          [address] [facility], [address]   [phone] fax [phone]    Name: Parmjit Ball  1940 [id number]  10/9/2017 8:00 AM     Assessment/Plan:  1. Acute on chronic diastolic CHF. NYHA Class IV on admission and again today. . Not taking diuretics PTA, fluid overloaded, massive edema, pro-BNP 6383, CXR showed new moderate left pleural effusion and cardiomegaly   -will repeat CXR today to assess effusion, hx of pleural effusion s/p VATS in the past   -continue Bumex 2mg IV BID and daily metolazone, negative 2.2 L overnight  -will add spironolactone today, continue losartan and coreg, stopped NTG paste today  -awaiting TTE results  -not able to do 6 minute walk test at discharge, primary team working on placement at discharge  2. Morbid obesity Body mass index is 58.63 kg/(m^2). needs to work on diet, exercise and weight loss  3. Afib  - not rate controlled, continue Coreg 12.5mg BID - will discuss uptitration today, [id number] on coumadin, INR 2.2 today, coumadin dosing per pharmacy  4. COPD - obesity/hypoventilation - management per IM  5. HTN - well controlled on current regimen  6. CKD stage III - creatinine stable at 1.4, continue to monitor with diuresis  7. SOB - multifactorial with obesity, copd, afib, diastolic chf  8. NSVT - none on telemetry overnight, continue Coreg, keep K 4-4.5 and Mg 2-2.5  9. Hypokalemia - K 3.2, will give KCL 20meq PO once now, continue scheduled KCL 20meq BID, recheck BMP in AM   10. Hx of GIANNA - does not use CPAP, will order overnight oximetry to assess  11.   Hx of left breast cancer    Echo 12/16 - LVEF 55 %, no WMA, dilated LA, mild MR, mild TR  Echo 3/12 - LVEF 60 % to 65 %, no WMA, wall thickness was mildly to moderately increased, mild cLVH, RV mildly dilated, wall thickness was mildly increased with systolic pressure moderately increased (RVSP 45mmHG), mod dilated LA, markedly dilated RA, mild TR, mod TR    Subjective:    Chanell Marsh is a 69 y.o. female admitted for Acute on chronic diastolic heart failure ([facility] She presented to ER with c/o increasing SOB x 1 week. She does not walk and had increased edema but not able to say how much. Not taking diuretics PTA and BP uncontrolled on admission. Afib rate uncontrolled on admission as well. Feels better after diuresis but still wheezing considerably. Has orthopnea and described feeling a choking feeling overnight. Still appears dyspneic at rest.  Denies any chest pain or palpitations.        Past Medical History:   Diagnosis Date    Arrhythmia     A Fib    Arthritis     Atrial fibrillation (HCC)     Cancer (HCC)     left breast cancer    Cardiomegaly     CKD (chronic kidney disease) stage 3, GFR 30-59 ml/min     judith    Clostridium difficile infection     Congestive heart failure, unspecified ([facility]     COPD     Diarrhea     /constipation    Dyspepsia and other specified disorders of function of stomach     Elevated antinuclear antibody (MARYAM) level     Endocrine disease     hypothyroidism    GERD (gastroesophageal reflux disease)     Hearing loss     Heart failure (HCC)     Hypertension     Hypertensive heart disease with heart failure (HCC)     diastolic dysfunction    Joint pain     Leg swelling     Long term (current) use of anticoagulants     Lymphedema     Morbid obesity (HCC)     Obesity, unspecified     Other ill-defined conditions     lymph edema    Pleural effusion     s/p VATS    Shortness of breath     Thyroid disease     Unspecified sleep apnea     Does not use machine     Past Surgical History:   Procedure Laterality Date    BREAST SURGERY PROCEDURE UNLISTED  13    LEFT BREAST MASTECTOMY SENTINEL NODE BIOPSY      HX BREAST BIOPSY      left    HX BREAST BIOPSY      fibroids removed right breast    HX  SECTION      times 2    HX KNEE ARTHROSCOPY      left knee     HX MODIFIED RADICAL MASTECTOMY 9/19/2013    BREAST MASTECTOMY MODIFIED RADICAL performed [facility] MAIN OR    HX OTHER SURGICAL      pleural effusion    HX RHINOPLASTY      HX DOMI AND BSO      HX TONSILLECTOMY       Current Facility-Administered Medications   Medication Dose Route Frequency    spironolactone (ALDACTONE) tablet 25 mg  25 mg Oral DAILY    isosorbide mononitrate ER (IMDUR) tablet 30 mg  30 mg Oral DAILY    potassium chloride SR (KLOR-CON 10) tablet 40 mEq  40 mEq Oral BID    potassium chloride 10 mEq in 50 ml IVPB  10 mEq IntraVENous Q1H    carvedilol (COREG) tablet 25 mg  25 mg Oral BID WITH MEALS    losartan (COZAAR) tablet 25 mg  25 mg Oral DAILY    metOLazone (ZAROXOLYN) tablet 5 mg  5 mg Oral DAILY    nystatin (MYCOSTATIN) 100,000 unit/gram powder   Topical TID    allopurinol (ZYLOPRIM) tablet 100 mg  100 mg Oral DAILY    docusate (COLACE) 50 mg/5 mL oral liquid 50 mg  50 mg Oral BID PRN    gabapentin (NEURONTIN) capsule 400 mg  400 mg Oral QHS    guaiFENesin ER (MUCINEX) tablet 600 mg  600 mg Oral BID    lactobac ac& pc-s.therm-b.anim (PHI Q/RISAQUAD)  1 Cap Oral DAILY    levothyroxine (SYNTHROID) tablet 112 mcg  112 mcg Oral ACB    loratadine (CLARITIN) tablet 10 mg  10 mg Oral DAILY    pantoprazole (PROTONIX) tablet 40 mg  40 mg Oral DAILY    venlafaxine (EFFEXOR) tablet 75 mg  75 mg Oral BID    sodium chloride (NS) flush 5-10 mL  5-10 mL IntraVENous Q8H    sodium chloride (NS) flush 5-10 mL  5-10 mL IntraVENous PRN    ondansetron (ZOFRAN) injection 4 mg  4 mg IntraVENous Q4H PRN    albuterol-ipratropium (DUO-NEB) 2.5 MG-0.5 MG/3 ML  3 mL Nebulization Q4H PRN    bumetanide (BUMEX) injection 2 mg  2 mg IntraVENous BID    warfarin - pharmacy to dose   Other Rx Dosing/Monitoring    acetaminophen (TYLENOL) tablet 650 mg  650 mg Oral Q4H PRN       Allergies   Allergen Reactions    Latex Rash    Grass Pollen-[place], Standard Unknown (comments)    Sulfa (Sulfonamide Antibiotics) Unknown (comments)      Family History   Problem Relation Age of Onset    Lung Disease Mother      Emphysema    Heart Disease Mother     Hypertension Mother     Cancer Father      Lung Cancer    Heart Disease Father     Hypertension Father     Cancer Paternal Aunt      colon      Social History     Social History    Marital status:      Spouse name: N/A    Number of children: N/A    Years of education: N/A     Social History Main Topics    Smoking status: Never Smoker    Smokeless tobacco: Never Used    Alcohol use No    Drug use: No    Sexual activity: Not on file     Other Topics Concern    Not on file     Social History Narrative    No narrative on file          Objective:      Physical Exam  Vitals:    10/08/17 2327 10/09/17 0219 10/09/17 0348 10/09/17 0829   BP: 148/62  116/61 119/68   Pulse: (!) 105  (!) 105 (!) 108   Resp: 20  20 20   Temp: 97.6 °F (36.4 °C)  97.7 °F (36.5 °C) 98 °F (36.7 °C)   SpO2: 98%  97% 98%   Weight:  (!) 397 lb (180.1 kg)     Height:           General:  Alert, cooperative, no distress, appears stated age. Morbidly obese   Eyes:  Conjunctivae/corneas clear. Ears:  Normal external ear canals both ears. Nose: No drainage   Mouth/Throat: Moist mucous membranes. Dentition fair   Neck: Symmetrical, trachea midline, no carotid bruit and no JVD. Back:   No CVA tenderness   Lungs:   Wheezing bilaterally    Heart:  Tachycardic, irregular rate and rhythm, S1, S2 normal, 1/6 ZACHERY   Abdomen:   Soft, non-tender. Bowel sounds normal. Obese    Extremities: Extremities  4+ BLE, brawny skin changes with scaling   Vascular: Unable to palpate pedal pulses    Skin: Chronic venous stasis changes. Lymph nodes: Not assessed    Neurologic: CNII-XII intact.  Weak, nonfocal       Telemetry: AFIB with -110bpm, PVCs    Data Review:     Recent Labs      10/07/17   0611  10/06/17   2151   CPK  58   --    CKMB  1.5   --    TROIQ  <0.04  <0.04     Recent Labs 10/09/17   0407  10/08/17   0140  10/07/17   0611   NA  137  141  140   K  3.2*  3.5  3.8   CL  95*  100  102   CO2  34*  32  29   BUN  13  14  12   CREA  1.44*  1.38*  1.29*   GLU  105*  120*  90   PHOS   --    --   3.0   CA  8.4*  8.6  8.8     Recent Labs      10/08/17   2007  10/08/17   0140   WBC  7.0  7.0   HGB  12.1  11.5   HCT  38.7  37.9   PLT  202  186     Recent Labs      10/09/17   0407  10/08/17   0140  10/07/17   0611  10/06/17   2151   PTP  22.5*  23.8*   --   20.8*   INR  2.2*  2.3*   --   2.0*   SGOT   --    --   17 17   AP   --    --   130*  135*     No results for input(s): CHOL, LDLC in the last 72 hours. No lab exists for component: TGL, HDLC,  HBA1C  Recent Labs      10/07/17   0611   TSH  5.40*     Thank you very much for this referral. I appreciate the opportunity to participate in this patient's care.       Messi Mclain MD  CC:Eva Jackson MD

## 2017-10-10 LAB
ANION GAP SERPL CALC-SCNC: 3 MMOL/L (ref 5–15)
BASOPHILS # BLD: 0 K/UL (ref 0–0.1)
BASOPHILS NFR BLD: 1 % (ref 0–1)
BUN SERPL-MCNC: 14 MG/DL (ref 6–20)
BUN/CREAT SERPL: 9 (ref 12–20)
CALCIUM SERPL-MCNC: 8.8 MG/DL (ref 8.5–10.1)
CHLORIDE SERPL-SCNC: 93 MMOL/L (ref 97–108)
CO2 SERPL-SCNC: 38 MMOL/L (ref 21–32)
CREAT SERPL-MCNC: 1.49 MG/DL (ref 0.55–1.02)
EOSINOPHIL # BLD: 0.4 K/UL (ref 0–0.4)
EOSINOPHIL NFR BLD: 6 % (ref 0–7)
ERYTHROCYTE [DISTWIDTH] IN BLOOD BY AUTOMATED COUNT: 15.9 % (ref 11.5–14.5)
GLUCOSE SERPL-MCNC: 99 MG/DL (ref 65–100)
HCT VFR BLD AUTO: 37.5 % (ref 35–47)
HGB BLD-MCNC: 12.1 G/DL (ref 11.5–16)
INR PPP: 2.2 (ref 0.9–1.1)
LYMPHOCYTES # BLD: 1.2 K/UL (ref 0.8–3.5)
LYMPHOCYTES NFR BLD: 18 % (ref 12–49)
MAGNESIUM SERPL-MCNC: 1.9 MG/DL (ref 1.6–2.4)
MCH RBC QN AUTO: 29.1 PG (ref 26–34)
MCHC RBC AUTO-ENTMCNC: 32.3 G/DL (ref 30–36.5)
MCV RBC AUTO: 90.1 FL (ref 80–99)
MONOCYTES # BLD: 0.8 K/UL (ref 0–1)
MONOCYTES NFR BLD: 13 % (ref 5–13)
NEUTS SEG # BLD: 4 K/UL (ref 1.8–8)
NEUTS SEG NFR BLD: 62 % (ref 32–75)
PLATELET # BLD AUTO: 261 K/UL (ref 150–400)
POTASSIUM SERPL-SCNC: 4.8 MMOL/L (ref 3.5–5.1)
PROTHROMBIN TIME: 23.1 SEC (ref 9–11.1)
RBC # BLD AUTO: 4.16 M/UL (ref 3.8–5.2)
SODIUM SERPL-SCNC: 134 MMOL/L (ref 136–145)
WBC # BLD AUTO: 6.4 K/UL (ref 3.6–11)

## 2017-10-10 PROCEDURE — 74011000250 HC RX REV CODE- 250: Performed by: INTERNAL MEDICINE

## 2017-10-10 PROCEDURE — 74011250637 HC RX REV CODE- 250/637: Performed by: INTERNAL MEDICINE

## 2017-10-10 PROCEDURE — 65660000000 HC RM CCU STEPDOWN

## 2017-10-10 PROCEDURE — 74011250637 HC RX REV CODE- 250/637: Performed by: NURSE PRACTITIONER

## 2017-10-10 PROCEDURE — 80048 BASIC METABOLIC PNL TOTAL CA: CPT | Performed by: NURSE PRACTITIONER

## 2017-10-10 PROCEDURE — 85025 COMPLETE CBC W/AUTO DIFF WBC: CPT | Performed by: NURSE PRACTITIONER

## 2017-10-10 PROCEDURE — 85610 PROTHROMBIN TIME: CPT | Performed by: HOSPITALIST

## 2017-10-10 PROCEDURE — 74011250637 HC RX REV CODE- 250/637: Performed by: HOSPITALIST

## 2017-10-10 PROCEDURE — 94640 AIRWAY INHALATION TREATMENT: CPT

## 2017-10-10 PROCEDURE — 83735 ASSAY OF MAGNESIUM: CPT | Performed by: NURSE PRACTITIONER

## 2017-10-10 PROCEDURE — 51798 US URINE CAPACITY MEASURE: CPT

## 2017-10-10 PROCEDURE — 36415 COLL VENOUS BLD VENIPUNCTURE: CPT | Performed by: NURSE PRACTITIONER

## 2017-10-10 RX ORDER — POTASSIUM CHLORIDE 750 MG/1
20 TABLET, FILM COATED, EXTENDED RELEASE ORAL 2 TIMES DAILY
Status: DISCONTINUED | OUTPATIENT
Start: 2017-10-10 | End: 2017-10-12

## 2017-10-10 RX ORDER — BISACODYL 5 MG
5 TABLET, DELAYED RELEASE (ENTERIC COATED) ORAL DAILY
Status: DISCONTINUED | OUTPATIENT
Start: 2017-10-10 | End: 2017-10-16 | Stop reason: HOSPADM

## 2017-10-10 RX ORDER — WARFARIN SODIUM 5 MG/1
5 TABLET ORAL ONCE
Status: ACTIVE | OUTPATIENT
Start: 2017-10-10 | End: 2017-10-11

## 2017-10-10 RX ADMIN — Medication 10 ML: at 14:45

## 2017-10-10 RX ADMIN — VENLAFAXINE 75 MG: 37.5 TABLET ORAL at 17:38

## 2017-10-10 RX ADMIN — CARVEDILOL 25 MG: 12.5 TABLET, FILM COATED ORAL at 09:02

## 2017-10-10 RX ADMIN — VENLAFAXINE 75 MG: 37.5 TABLET ORAL at 09:02

## 2017-10-10 RX ADMIN — GUAIFENESIN 600 MG: 600 TABLET, EXTENDED RELEASE ORAL at 09:02

## 2017-10-10 RX ADMIN — BISACODYL 5 MG: 5 TABLET, COATED ORAL at 14:45

## 2017-10-10 RX ADMIN — SPIRONOLACTONE 25 MG: 25 TABLET, FILM COATED ORAL at 09:01

## 2017-10-10 RX ADMIN — ACETAMINOPHEN 650 MG: 325 TABLET, FILM COATED ORAL at 09:09

## 2017-10-10 RX ADMIN — LEVOTHYROXINE SODIUM 112 MCG: 112 TABLET ORAL at 07:46

## 2017-10-10 RX ADMIN — GUAIFENESIN 600 MG: 600 TABLET, EXTENDED RELEASE ORAL at 21:50

## 2017-10-10 RX ADMIN — IPRATROPIUM BROMIDE AND ALBUTEROL SULFATE 3 ML: .5; 3 SOLUTION RESPIRATORY (INHALATION) at 01:25

## 2017-10-10 RX ADMIN — Medication 10 ML: at 06:59

## 2017-10-10 RX ADMIN — Medication 10 ML: at 21:50

## 2017-10-10 RX ADMIN — ACETAMINOPHEN 650 MG: 325 TABLET, FILM COATED ORAL at 01:20

## 2017-10-10 RX ADMIN — POTASSIUM CHLORIDE 20 MEQ: 750 TABLET, FILM COATED, EXTENDED RELEASE ORAL at 17:38

## 2017-10-10 RX ADMIN — CARVEDILOL 25 MG: 12.5 TABLET, FILM COATED ORAL at 17:38

## 2017-10-10 RX ADMIN — NYSTATIN: 100000 POWDER TOPICAL at 17:38

## 2017-10-10 RX ADMIN — ALLOPURINOL 100 MG: 100 TABLET ORAL at 09:02

## 2017-10-10 RX ADMIN — BUMETANIDE 1 MG: 0.25 INJECTION INTRAMUSCULAR; INTRAVENOUS at 18:00

## 2017-10-10 RX ADMIN — GABAPENTIN 400 MG: 400 CAPSULE ORAL at 21:50

## 2017-10-10 RX ADMIN — NYSTATIN: 100000 POWDER TOPICAL at 21:51

## 2017-10-10 RX ADMIN — LORATADINE 10 MG: 10 TABLET ORAL at 09:02

## 2017-10-10 RX ADMIN — NYSTATIN: 100000 POWDER TOPICAL at 09:16

## 2017-10-10 RX ADMIN — Medication 1 CAPSULE: at 09:00

## 2017-10-10 RX ADMIN — BUMETANIDE 2 MG: 0.25 INJECTION INTRAMUSCULAR; INTRAVENOUS at 09:02

## 2017-10-10 RX ADMIN — METOLAZONE 5 MG: 5 TABLET ORAL at 09:01

## 2017-10-10 RX ADMIN — PANTOPRAZOLE SODIUM 40 MG: 40 TABLET, DELAYED RELEASE ORAL at 09:02

## 2017-10-10 NOTE — PROGRESS NOTES
in today at bedside let me know that she does seen [facility] as an outpatient. Will as them to consult for further fluid mgmt. Also requests nephrology input.

## 2017-10-10 NOTE — PROGRESS NOTES
10/10/17 0125   Oxygen Therapy   O2 Sat (%) 99 %   Pulse via Oximetry 97 beats per minute   O2 Device Nasal cannula   O2 Flow Rate (L/min) 2.5 l/min   Pre-Treatment   Breathing Pattern Regular   Cough Non-productive; Congested   Breath Sounds Bilateral Diminished   Pulse 97   SpO2 99 %   Respirations 16   Procedures   $$ Subsequent Procedure Aerosol   Delivery Source Breath Actuated Nebulizer;Mask   Aerosolized Medications DuoNeb   Called by RN to give patient a PRN treatment because patient SOB and wheezing. When I arrive patient is not in distress but SOB when she talks and coughs. Her wheezing is more in her throat than in her lower airways, she has a cough and keeps trying to clear her throat. Treatment given. I asked patient during the treatment how she was feeling and if it was any better she stated that she felt the same.

## 2017-10-10 NOTE — PROGRESS NOTES
Problem: Pressure Injury - Risk of  Goal: *Prevention of pressure ulcer  Outcome: Progressing Towards Goal  Pt on turn team. Turned Q2H. No s/sx of pressure ulcer noted. Problem: Falls - Risk of  Goal: *Absence of Falls  Document Jacob Fall Risk and appropriate interventions in the flowsheet. Outcome: Progressing Towards Goal  Pt's bed in low/locked position. All personal items and call bell within reach. Side rails up x 3. Bathroom light on at all times. Pt bed ridden and unable to get up without max assist.      Fall Risk Interventions:              Medication Interventions: Bed/chair exit alarm, Evaluate medications/consider consulting pharmacy, Patient to call before getting OOB     Elimination Interventions: Call light in reach, Elevated toilet seat, Patient to call for help with toileting needs     History of Falls Interventions: Consult care management for discharge planning, Door open when patient unattended, Evaluate medications/consider consulting pharmacy           Problem: Heart Failure: Day 4  Goal: *Oxygen saturation within defined limits  Outcome: Progressing Towards Goal  Pt's O2 saturation remains above 95% on 1.5 L of O2. Will try to wean patient today. Goal: *Hemodynamically stable  Outcome: Progressing Towards Goal  Pt's VSS. A Fib controlled in the 90s. Goal: *Optimal pain control at patients stated goal  Outcome: Progressing Towards Goal  Pt has minimal back pain rated 1-2/10. Goal: *Anxiety reduced or absent  Outcome: Progressing Towards Goal  Pt has no s/sx of anxiety at this time. Goal: *Demonstrates progressive activity  Outcome: Not Progressing Towards Goal  Pt unable to perform progressive activity as patient is wheelchair bound/bed ridden.

## 2017-10-10 NOTE — PROGRESS NOTES
0730: Bedside shift change report given to HUDSON MONTESINOS (oncoming nurse) by Lita Fuchs RN (offgoing nurse). Report included the following information SBAR, Kardex, Intake/Output, MAR, Recent Results, Med Rec Status and Cardiac Rhythm A Fib.   1930: Bedside and Verbal shift change report given to Sonya Romero RN (oncoming nurse) by HUDSON MONTESINOS (offgoing nurse). Report included the following information SBAR, Kardex, Intake/Output, MAR, Recent Results, Med Rec Status and Cardiac Rhythm A Fib.

## 2017-10-10 NOTE — PROGRESS NOTES
Hospitalist Progress Note  German Viera MD  Office: [phone]        Date of Service:  10/10/2017  NAME:  Henrique Britt  :  1940  MRN:  [id number]      Admission Summary:   69-year-old woman with a past medical history significant for bilateral lower extremity edema, atrialfibrillation, obstructive sleep apnea, COPD, hypothyroidism, hypertension, depression, chronic kidney disease stage 3, morbid obesity, admitted for acute on chronic diastolic HF. Interval history / Subjective:   Pt seen and examined  Breathing seems to be better  Had back pain earlier and was bladder scanned and straight cath for urine        Assessment & Plan:     Acute on chronic diastolic CHF: Diuresis as per cardiology. On Bumex and metolazone. On Coreg, Losartan, Imdur, spironolactone. Left mod pleural effusion  - Cxr: 10/9 with improvement  - repeat CXR in am, no improvement will place for US guided thoracentesis     A.fib: on coumadin and coreg  - Inr 2.2    Sleep apnea: pt intolerant of BiPAP or CPAP, overnight pulse ox.     Morbid obesity: discussed a about healthy lifestyle modification and comorbidities associated with it.     CKD-3 : monitor renal function with diuresis. monitor electrolytes and replace as needed. Hypokalemia and hypo magnesemia: replaced    Wound care for LE lymphedema with small ulcer.     COPD: nebs and home meds. HTN coreg, losartan  DVT PPX on coumadin. Care Plan discussed with: patient  Disposition: rehab after diuresis     Hospital Problems  Date Reviewed: 10/7/2017          Codes Class Noted POA    * (Principal)Acute on chronic diastolic heart failure ([facility] ICD-10-CM: I50.33  ICD-9-CM: 428.33  10/6/2017 Yes                Review of Systems:   No chest pain, head ache, dizziness. Vital Signs:    Last 24hrs VS reviewed since prior progress note.  Most recent are:  Visit Vitals    /48 (BP 1 Location: Right arm, BP Patient Position: At rest)    Pulse 99    Temp 97.5 °F (36.4 °C)    Resp 22    Ht 5' 9\" (1.753 m)    Wt (!) 176.7 kg (389 lb 8.9 oz)    SpO2 97%    Breastfeeding No    BMI 57.53 kg/m2         Intake/Output Summary (Last 24 hours) at 10/10/17 1254  Last data filed at 10/10/17 1113   Gross per 24 hour   Intake              800 ml   Output             2825 ml   Net            -2025 ml        Physical Examination:       Gen: Not in acute distress  HEENT: atraumatic, normocephalic, moist mucous membranes  CVS: Irregularly irregular   RS: fair air entry, no wheezing   GI : obese, NT, ND bowel sounds present   Extremities: LE edematous, lymph edema,  Neuro Alert and oriented x3    Data Review:    Labs reviewed      Labs:     Recent Labs      10/10/17   0508  10/08/17   2007   WBC  6.4  7.0   HGB  12.1  12.1   HCT  37.5  38.7   PLT  261  202     Recent Labs      10/10/17   0508  10/09/17   0407  10/08/17   0140   NA  134*  137  141   K  4.8  3.2*  3.5   CL  93*  95*  100   CO2  38*  34*  32   BUN  14  13  14   CREA  1.49*  1.44*  1.38*   GLU  99  105*  120*   CA  8.8  8.4*  8.6   MG  1.9  1.7  1.6     No results for input(s): SGOT, GPT, ALT, AP, TBIL, TBILI, TP, ALB, GLOB, GGT, AML, LPSE in the last 72 hours. No lab exists for component: AMYP, HLPSE  Recent Labs      10/10/17   0612  10/09/17   0407  10/08/17   0140   INR  2.2*  2.2*  2.3*   PTP  23.1*  22.5*  23.8*      No results for input(s): FE, TIBC, PSAT, FERR in the last 72 hours. No results found for: FOL, RBCF   No results for input(s): PH, PCO2, PO2 in the last 72 hours. No results for input(s): CPK, CKNDX, TROIQ in the last 72 hours.     No lab exists for component: CPKMB  No results found for: CHOL, CHOLX, CHLST, CHOLV, HDL, LDL, LDLC, DLDLP, TGLX, TRIGL, TRIGP, CHHD, CHHDX  Lab Results   Component Value Date/Time    Glucose (POC) 98 10/07/2017 11:07 AM    Glucose (POC) 92 10/07/2017 06:50 AM    Glucose (POC) 169 12/05/2016 04:43 PM Glucose (POC) 126 12/05/2016 12:15 PM    Glucose (POC) 112 12/05/2016 08:23 AM     Lab Results   Component Value Date/Time    Color DARK YELLOW 12/02/2016 05:46 PM    Appearance TURBID 12/02/2016 05:46 PM    Specific gravity 1.022 12/02/2016 05:46 PM    Specific gravity 1.015 03/16/2012 01:55 AM    pH (UA) 5.0 12/02/2016 05:46 PM    Protein 30 12/02/2016 05:46 PM    Glucose NEGATIVE  12/02/2016 05:46 PM    Ketone TRACE 12/02/2016 05:46 PM    Bilirubin NEGATIVE  07/08/2012 12:10 AM    Urobilinogen 1.0 12/02/2016 05:46 PM    Nitrites NEGATIVE  12/02/2016 05:46 PM    Leukocyte Esterase NEGATIVE  12/02/2016 05:46 PM    Epithelial cells MANY 12/02/2016 05:46 PM    Bacteria 1+ 12/02/2016 05:46 PM    WBC 0-4 12/02/2016 05:46 PM    RBC 0-5 12/02/2016 05:46 PM         Medications Reviewed:     Current Facility-Administered Medications   Medication Dose Route Frequency    potassium chloride SR (KLOR-CON 10) tablet 20 mEq  20 mEq Oral BID    warfarin (COUMADIN) tablet 5 mg  5 mg Oral ONCE    bisacodyl (DULCOLAX) tablet 5 mg  5 mg Oral DAILY    spironolactone (ALDACTONE) tablet 25 mg  25 mg Oral DAILY    isosorbide mononitrate ER (IMDUR) tablet 30 mg  30 mg Oral DAILY    losartan (COZAAR) tablet 25 mg  25 mg Oral DAILY    influenza vaccine 2017-18 (3 yrs+)(PF) (FLUZONE QUAD/FLUARIX QUAD) injection 0.5 mL  0.5 mL IntraMUSCular PRIOR TO DISCHARGE    carvedilol (COREG) tablet 25 mg  25 mg Oral BID WITH MEALS    metOLazone (ZAROXOLYN) tablet 5 mg  5 mg Oral DAILY    nystatin (MYCOSTATIN) 100,000 unit/gram powder   Topical TID    allopurinol (ZYLOPRIM) tablet 100 mg  100 mg Oral DAILY    docusate (COLACE) 50 mg/5 mL oral liquid 50 mg  50 mg Oral BID PRN    gabapentin (NEURONTIN) capsule 400 mg  400 mg Oral QHS    guaiFENesin ER (MUCINEX) tablet 600 mg  600 mg Oral BID    lactobac ac& pc-s.therm-b.anim (PHI Q/RISAQUAD)  1 Cap Oral DAILY    levothyroxine (SYNTHROID) tablet 112 mcg  112 mcg Oral ACB    loratadine (CLARITIN) tablet 10 mg  10 mg Oral DAILY    pantoprazole (PROTONIX) tablet 40 mg  40 mg Oral DAILY    venlafaxine (EFFEXOR) tablet 75 mg  75 mg Oral BID    sodium chloride (NS) flush 5-10 mL  5-10 mL IntraVENous Q8H    sodium chloride (NS) flush 5-10 mL  5-10 mL IntraVENous PRN    ondansetron (ZOFRAN) injection 4 mg  4 mg IntraVENous Q4H PRN    albuterol-ipratropium (DUO-NEB) 2.5 MG-0.5 MG/3 ML  3 mL Nebulization Q4H PRN    bumetanide (BUMEX) injection 2 mg  2 mg IntraVENous BID    warfarin - pharmacy to dose   Other Rx Dosing/Monitoring    acetaminophen (TYLENOL) tablet 650 mg  650 mg Oral Q4H PRN     ______________________________________________________________________  EXPECTED LENGTH OF STAY: 4d 12h  ACTUAL LENGTH OF STAY:          89 Narda Andrews MD

## 2017-10-10 NOTE — PROGRESS NOTES
Problem: Heart Failure: Day 1  Goal: *Hemodynamically stable  Outcome: Progressing Towards Goal  VSS   Visit Vitals    /74 (BP 1 Location: Right arm, BP Patient Position: At rest)    Pulse 100    Temp 97.6 °F (36.4 °C)    Resp 20    Ht 5' 9\" (1.753 m)    Wt (!) 180.1 kg (397 lb)    SpO2 99%    Breastfeeding No    BMI 58.63 kg/m2           Problem: Pressure Injury - Risk of  Goal: *Prevention of pressure ulcer  Outcome: Progressing Towards Goal  No pressure ulcer noted. Problem: Falls - Risk of  Goal: *Absence of Falls  Document Jacob Fall Risk and appropriate interventions in the flowsheet. Outcome: Progressing Towards Goal  Fall Risk Interventions:              Medication Interventions: Evaluate medications/consider consulting pharmacy     Elimination Interventions: Call light in reach     History of Falls Interventions: Consult care management for discharge planning     0800: Bedside shift change report given to JAGUAR (oncoming nurse) by Cuauhtemoc Lundy (offgoing nurse). Report included the following information SBAR, Kardex, Intake/Output, MAR and Recent Results.

## 2017-10-10 NOTE — PROGRESS NOTES
Spiritual Care Partner Volunteer visited patient in Rm [id number] on 10/10/17.   Documented by:  Chaplain Garzon MDiv, MS, [address] ([id number])

## 2017-10-10 NOTE — PROGRESS NOTES
[facility] Daily Progress Note          [address] [facility] [address], [address]   [phone] fax [phone]    Name: Sheridan Escalante  1940 [id number]  10/10/2017 8:00 AM     Assessment/Plan:  1. Acute on chronic diastolic CHF. NYHA Class IV on admission and again today. . Not taking diuretics PTA, fluid overloaded, massive edema, pro-BNP 6383, CXR showed moderate left pleural effusion and cardiomegaly   -hx of pleural effusion s/p VATS in the past, persistent left pleural effusion - will consider thoracentesis in AM if persists on CXR in AM, IR will not do thoracentesis today due to INR 2.2, they would like INR < 2.0, discussed with hospitalist - will repeat CXR in AM and if pleural effusion persists will hold coumadin for thoracentesis  -continue Bumex 2mg IV BID and daily metolazone, negative 1.9 L overnight  -continue spironolactone, losartan and coreg  -LVEF 45% by TTE with valve disease (see below)  -not able to do 6 minute walk test at discharge, primary team working on placement at discharge  2. Morbid obesity Body mass index is 57.53 kg/(m^2). needs to work on diet, exercise and weight loss  3. Afib  - rate controlled on Coreg  25mg BID, [id number] on coumadin, INR 2.2 today, coumadin dosing per pharmacy  4. COPD - obesity/hypoventilation - management per IM  5. HTN - well controlled on current regimen  6. CKD stage III - creatinine stable at 1.4, continue to monitor with diuresis  7. SOB - multifactorial with obesity, copd, afib, diastolic chf  8. NSVT - none on telemetry overnight, continue Coreg, keep K 4-4.5 and Mg 2-2.5  9. Hypokalemia - K 4.8 today, will reduce KCL to 20meq BID and hold dose this AM, recheck BMP in AM   10. Hx of GIANNA - does not use CPAP, ordered overnight oximetry to assess  11. Hx of left breast cancer  12.   Valve disease (mild to mod MR, mod AI, mild TR) - continue with diuresis    Echo 10/17 - LVEF 45 %, possible moderate hypokinesis of the apical wall(s), mild to mod MR, mod AI, mild TR  Echo 12/16 - LVEF 55 %, no WMA, dilated LA, mild MR, mild TR  Echo 3/12 - LVEF 60 % to 65 %, no WMA, wall thickness was mildly to moderately increased, mild cLVH, RV mildly dilated, wall thickness was mildly increased with systolic pressure moderately increased (RVSP 45mmHG), mod dilated LA, markedly dilated RA, mild TR, mod TR    Subjective:    Alejandra Quijano is a 69 y.o. female admitted for [facility] on [facility] She presented to ER with c/o increasing SOB x 1 week. She does not walk and had increased edema but not able to say how much. Not taking diuretics PTA and BP uncontrolled on admission. Afib rate uncontrolled on admission as well. Feels better after diuresis, has less wheezing today on exam and able to speak better with less breathlessness. Has orthopnea. Denies any chest pain or palpitations. She c/o having trouble with urination this morning.      Past Medical History:   Diagnosis Date    Arrhythmia     A Fib    Arthritis     Atrial fibrillation (HCC)     Cancer (HCC)     left breast cancer    Cardiomegaly     CKD (chronic kidney disease) stage 3, GFR 30-59 ml/min     judith    Clostridium difficile infection     Congestive heart failure, unspecified ([facility]     COPD     Diarrhea     /constipation    Dyspepsia and other specified disorders of function of stomach     Elevated antinuclear antibody (MARYAM) level     Endocrine disease     hypothyroidism    GERD (gastroesophageal reflux disease)     Hearing loss     Heart failure (HCC)     Hypertension     Hypertensive heart disease with heart failure (HCC)     diastolic dysfunction    Joint pain     Leg swelling     Long term (current) use of anticoagulants     Lymphedema     Morbid obesity (HCC)     Obesity, unspecified     Other ill-defined conditions     lymph edema    Pleural effusion     s/p VATS    Shortness of breath     Thyroid disease     Unspecified sleep apnea     Does not use machine     Past Surgical History:   Procedure Laterality Date    BREAST SURGERY PROCEDURE UNLISTED  13    LEFT BREAST MASTECTOMY SENTINEL NODE BIOPSY      HX BREAST BIOPSY      left    HX BREAST BIOPSY      fibroids removed right breast    HX  SECTION      times 2    HX KNEE ARTHROSCOPY      left knee     HX MODIFIED RADICAL MASTECTOMY  2013    BREAST MASTECTOMY MODIFIED RADICAL performed by Tanner Marin MD at [facility] MAIN OR    HX OTHER SURGICAL      pleural effusion    HX RHINOPLASTY      HX DOMI AND BSO      HX TONSILLECTOMY       Current Facility-Administered Medications   Medication Dose Route Frequency    potassium chloride SR (KLOR-CON 10) tablet 20 mEq  20 mEq Oral BID    warfarin (COUMADIN) tablet 5 mg  5 mg Oral ONCE    bisacodyl (DULCOLAX) tablet 5 mg  5 mg Oral DAILY    spironolactone (ALDACTONE) tablet 25 mg  25 mg Oral DAILY    isosorbide mononitrate ER (IMDUR) tablet 30 mg  30 mg Oral DAILY    losartan (COZAAR) tablet 25 mg  25 mg Oral DAILY    influenza vaccine  (3 yrs+)(PF) (FLUZONE QUAD/FLUARIX QUAD) injection 0.5 mL  0.5 mL IntraMUSCular PRIOR TO DISCHARGE    carvedilol (COREG) tablet 25 mg  25 mg Oral BID WITH MEALS    metOLazone (ZAROXOLYN) tablet 5 mg  5 mg Oral DAILY    nystatin (MYCOSTATIN) 100,000 unit/gram powder   Topical TID    allopurinol (ZYLOPRIM) tablet 100 mg  100 mg Oral DAILY    docusate (COLACE) 50 mg/5 mL oral liquid 50 mg  50 mg Oral BID PRN    gabapentin (NEURONTIN) capsule 400 mg  400 mg Oral QHS    guaiFENesin ER (MUCINEX) tablet 600 mg  600 mg Oral BID    lactobac ac& pc-s.therm-b.anim (PHI Q/RISAQUAD)  1 Cap Oral DAILY    levothyroxine (SYNTHROID) tablet 112 mcg  112 mcg Oral ACB    loratadine (CLARITIN) tablet 10 mg  10 mg Oral DAILY    pantoprazole (PROTONIX) tablet 40 mg  40 mg Oral DAILY    venlafaxine (EFFEXOR) tablet 75 mg  75 mg Oral BID    sodium chloride (NS) flush 5-10 mL 5-10 mL IntraVENous Q8H    sodium chloride (NS) flush 5-10 mL  5-10 mL IntraVENous PRN    ondansetron (ZOFRAN) injection 4 mg  4 mg IntraVENous Q4H PRN    albuterol-ipratropium (DUO-NEB) 2.5 MG-0.5 MG/3 ML  3 mL Nebulization Q4H PRN    bumetanide (BUMEX) injection 2 mg  2 mg IntraVENous BID    warfarin - pharmacy to dose   Other Rx Dosing/Monitoring    acetaminophen (TYLENOL) tablet 650 mg  650 mg Oral Q4H PRN       Allergies   Allergen Reactions    Latex Rash    Grass Pollen-[place], Standard Unknown (comments)    Sulfa (Sulfonamide Antibiotics) Unknown (comments)      Family History   Problem Relation Age of Onset    Lung Disease Mother      Emphysema    Heart Disease Mother     Hypertension Mother     Cancer Father      Lung Cancer    Heart Disease Father     Hypertension Father     Cancer Paternal Aunt      colon      Social History     Social History    Marital status:      Spouse name: N/A    Number of children: N/A    Years of education: N/A     Social History Main Topics    Smoking status: Never Smoker    Smokeless tobacco: Never Used    Alcohol use No    Drug use: No    Sexual activity: Not on file     Other Topics Concern    Not on file     Social History Narrative    No narrative on file          Objective:      Physical Exam  Vitals:    10/10/17 0514 10/10/17 0905 10/10/17 1113 10/10/17 1519   BP:  106/46 103/48 99/65   Pulse:  93 99 98   Resp:  18 22 20   Temp:  97.6 °F (36.4 °C) 97.5 °F (36.4 °C) 97.6 °F (36.4 °C)   SpO2:  96% 97% 94%   Weight: (!) 389 lb 8.9 oz (176.7 kg)      Height:           General:  Alert, cooperative, no distress, appears stated age. Morbidly obese   Eyes:  Conjunctivae/corneas clear. Ears:  Normal external ear canals both ears. Nose: No drainage   Mouth/Throat: Moist mucous membranes. Dentition fair   Neck: Symmetrical, trachea midline, no carotid bruit and no JVD.    Back:   No CVA tenderness   Lungs:   Scattered wheezing bilaterally but less today   Heart:  Irregular rate and rhythm, S1, S2 normal, 2/6 ZACHERY   Abdomen:   Soft, non-tender. Bowel sounds normal. Obese    Extremities: Extremities  4+ BLE, brawny skin changes with scaling   Vascular: Unable to palpate pedal pulses    Skin: Chronic venous stasis changes. Lymph nodes: Not assessed    Neurologic: CNII-XII intact. Weak, nonfocal       Telemetry: AFIB with VR 90-110bpm, PVCs    Data Review:     No results for input(s): CPK, CKMB, TROIQ in the last 72 hours. No lab exists for component: CKQMB, CPKMB, BMPP  Recent Labs      10/10/17   0508  10/09/17   0407   NA  134*  137   K  4.8  3.2*   CL  93*  95*   CO2  38*  34*   BUN  14  13   CREA  1.49*  1.44*   GLU  99  105*   CA  8.8  8.4*     Recent Labs      10/10/17   0508  10/08/17   2007   WBC  6.4  7.0   HGB  12.1  12.1   HCT  37.5  38.7   PLT  261  202     Recent Labs      10/10/17   0612  10/09/17   0407   PTP  23.1*  22.5*   INR  2.2*  2.2*     No results for input(s): CHOL, LDLC in the last 72 hours. No lab exists for component: TGL, HDLC,  HBA1C  No results for input(s): CRP, TSH, TSHEXT, TSHEXT in the last 72 hours. No lab exists for component: ESR  Thank you very much for this referral. I appreciate the opportunity to participate in this patient's care.       Flavio Collins MD  CC:Eva Jackson MD

## 2017-10-10 NOTE — PROGRESS NOTES
Visited with patient and she states her and her  are still reviewing SNF facilities to decide which rehab places they wish to consider - she will try to call her  this afternoon and make choices and let CM know.  BRANDON Sandoval

## 2017-10-11 ENCOUNTER — APPOINTMENT (OUTPATIENT)
Dept: GENERAL RADIOLOGY | Age: 77
DRG: 291 | End: 2017-10-11
Attending: HOSPITALIST
Payer: MEDICARE

## 2017-10-11 LAB
ANION GAP SERPL CALC-SCNC: 6 MMOL/L (ref 5–15)
BUN SERPL-MCNC: 17 MG/DL (ref 6–20)
BUN/CREAT SERPL: 11 (ref 12–20)
CALCIUM SERPL-MCNC: 8.8 MG/DL (ref 8.5–10.1)
CHLORIDE SERPL-SCNC: 92 MMOL/L (ref 97–108)
CO2 SERPL-SCNC: 39 MMOL/L (ref 21–32)
CREAT SERPL-MCNC: 1.5 MG/DL (ref 0.55–1.02)
GLUCOSE SERPL-MCNC: 97 MG/DL (ref 65–100)
INR PPP: 1.7 (ref 0.9–1.1)
INR PPP: 2.2 (ref 0.9–1.1)
MAGNESIUM SERPL-MCNC: 1.8 MG/DL (ref 1.6–2.4)
POTASSIUM SERPL-SCNC: 3.6 MMOL/L (ref 3.5–5.1)
PROTHROMBIN TIME: 17.4 SEC (ref 9–11.1)
PROTHROMBIN TIME: 22 SEC (ref 9–11.1)
SODIUM SERPL-SCNC: 137 MMOL/L (ref 136–145)

## 2017-10-11 PROCEDURE — 97530 THERAPEUTIC ACTIVITIES: CPT

## 2017-10-11 PROCEDURE — 80048 BASIC METABOLIC PNL TOTAL CA: CPT | Performed by: NURSE PRACTITIONER

## 2017-10-11 PROCEDURE — 74011000258 HC RX REV CODE- 258: Performed by: HOSPITALIST

## 2017-10-11 PROCEDURE — 36415 COLL VENOUS BLD VENIPUNCTURE: CPT | Performed by: INTERNAL MEDICINE

## 2017-10-11 PROCEDURE — 74011250637 HC RX REV CODE- 250/637: Performed by: HOSPITALIST

## 2017-10-11 PROCEDURE — 74011250636 HC RX REV CODE- 250/636: Performed by: HOSPITALIST

## 2017-10-11 PROCEDURE — 74011250637 HC RX REV CODE- 250/637: Performed by: INTERNAL MEDICINE

## 2017-10-11 PROCEDURE — 85610 PROTHROMBIN TIME: CPT | Performed by: INTERNAL MEDICINE

## 2017-10-11 PROCEDURE — 97535 SELF CARE MNGMENT TRAINING: CPT

## 2017-10-11 PROCEDURE — 71010 XR CHEST PORT: CPT

## 2017-10-11 PROCEDURE — 74011250637 HC RX REV CODE- 250/637: Performed by: NURSE PRACTITIONER

## 2017-10-11 PROCEDURE — 74011000250 HC RX REV CODE- 250: Performed by: INTERNAL MEDICINE

## 2017-10-11 PROCEDURE — 74011000250 HC RX REV CODE- 250: Performed by: HOSPITALIST

## 2017-10-11 PROCEDURE — 65660000000 HC RM CCU STEPDOWN

## 2017-10-11 PROCEDURE — 83735 ASSAY OF MAGNESIUM: CPT | Performed by: NURSE PRACTITIONER

## 2017-10-11 RX ORDER — BUMETANIDE 0.25 MG/ML
1 INJECTION INTRAMUSCULAR; INTRAVENOUS 2 TIMES DAILY
Status: DISCONTINUED | OUTPATIENT
Start: 2017-10-11 | End: 2017-10-16

## 2017-10-11 RX ADMIN — Medication 1 CAPSULE: at 08:48

## 2017-10-11 RX ADMIN — GUAIFENESIN 600 MG: 600 TABLET, EXTENDED RELEASE ORAL at 21:28

## 2017-10-11 RX ADMIN — Medication 10 ML: at 14:45

## 2017-10-11 RX ADMIN — NYSTATIN: 100000 POWDER TOPICAL at 16:00

## 2017-10-11 RX ADMIN — PANTOPRAZOLE SODIUM 40 MG: 40 TABLET, DELAYED RELEASE ORAL at 08:48

## 2017-10-11 RX ADMIN — POTASSIUM CHLORIDE 20 MEQ: 750 TABLET, FILM COATED, EXTENDED RELEASE ORAL at 08:48

## 2017-10-11 RX ADMIN — NYSTATIN: 100000 POWDER TOPICAL at 08:49

## 2017-10-11 RX ADMIN — VENLAFAXINE 75 MG: 37.5 TABLET ORAL at 08:48

## 2017-10-11 RX ADMIN — NYSTATIN: 100000 POWDER TOPICAL at 21:32

## 2017-10-11 RX ADMIN — GABAPENTIN 400 MG: 400 CAPSULE ORAL at 21:28

## 2017-10-11 RX ADMIN — PHYTONADIONE 2.5 MG: 10 INJECTION, EMULSION INTRAMUSCULAR; INTRAVENOUS; SUBCUTANEOUS at 09:50

## 2017-10-11 RX ADMIN — POTASSIUM CHLORIDE 20 MEQ: 750 TABLET, FILM COATED, EXTENDED RELEASE ORAL at 17:44

## 2017-10-11 RX ADMIN — BUMETANIDE 1 MG: 0.25 INJECTION INTRAMUSCULAR; INTRAVENOUS at 17:45

## 2017-10-11 RX ADMIN — ISOSORBIDE MONONITRATE 30 MG: 30 TABLET ORAL at 08:48

## 2017-10-11 RX ADMIN — LEVOTHYROXINE SODIUM 112 MCG: 112 TABLET ORAL at 08:13

## 2017-10-11 RX ADMIN — METOLAZONE 5 MG: 5 TABLET ORAL at 08:48

## 2017-10-11 RX ADMIN — CARVEDILOL 25 MG: 12.5 TABLET, FILM COATED ORAL at 17:44

## 2017-10-11 RX ADMIN — SPIRONOLACTONE 25 MG: 25 TABLET, FILM COATED ORAL at 08:48

## 2017-10-11 RX ADMIN — CARVEDILOL 25 MG: 12.5 TABLET, FILM COATED ORAL at 08:48

## 2017-10-11 RX ADMIN — ALLOPURINOL 100 MG: 100 TABLET ORAL at 08:48

## 2017-10-11 RX ADMIN — VENLAFAXINE 75 MG: 37.5 TABLET ORAL at 17:44

## 2017-10-11 RX ADMIN — LORATADINE 10 MG: 10 TABLET ORAL at 08:48

## 2017-10-11 RX ADMIN — BISACODYL 5 MG: 5 TABLET, COATED ORAL at 08:55

## 2017-10-11 RX ADMIN — GUAIFENESIN 600 MG: 600 TABLET, EXTENDED RELEASE ORAL at 08:49

## 2017-10-11 RX ADMIN — BUMETANIDE 2 MG: 0.25 INJECTION INTRAMUSCULAR; INTRAVENOUS at 08:48

## 2017-10-11 RX ADMIN — LOSARTAN POTASSIUM 25 MG: 25 TABLET ORAL at 08:48

## 2017-10-11 RX ADMIN — Medication 10 ML: at 22:00

## 2017-10-11 NOTE — ACP (ADVANCE CARE PLANNING)
is responding to request for AMD with pt in room 456. Pt states she has an AMD on file at home. Her concern is making sure she is not a DNR, and would be full code.  confirmed this as reflected in her chart. She wants to make sure she'd have resuscitating efforts, initially, if something happened to her, and then wants to make sure her Living Will is respected honoring her wishes for no heroic measures, if it is determined that she is imminently dying without a hope of recovery. She will have her  retrieve her AMD and bring to the hospital to have on file.  followed up with  for discussion about pt's AMD status.      [id number] Samantha Holliday M.Div, M.S, [address] available at [facility]([id number])

## 2017-10-11 NOTE — PROGRESS NOTES
Pharmacist Note  Warfarin Dosing  Consult provided for this 69 y. o.female to manage warfarin for h/o Atrial Fibrillation. INR Goal: 2 - 3    Home regimen/ tablet size: 2.5 mg on M/W/TH/SA; 5 mg on T/F/Sun    Drugs that may increase INR:  Allopurinol  Drugs that may decrease INR: None  Other current anticoagulants/ drugs that may increase bleeding risk: none  Risk factors: Age > 65, Acute on chronic diastolic CHF  Daily INR ordered: YES    Recent Labs      10/11/17   0334  10/10/17   0612  10/10/17   0508  10/09/17   0407  10/08/17   2007   HGB   --    --   12.1   --   12.1   INR  2.2*  2.2*   --   2.2*   --      Date               INR                  Dose  10/06            2.0      --   10/07  --  7.5 mg    10/08              2.3                    5 mg  10/09              2.2                    5 mg  10/10              2.2                    5 mg                                                                                 Assessment/ Plan: Will Hold warfarin today in preparation for planned thoracentesis 10/12/17    Pharmacy will continue to monitor daily and adjust therapy as indicated.

## 2017-10-11 NOTE — PROGRESS NOTES
Hospitalist Progress Note  Silvestre Prather MD  Office: [phone]        Date of Service:  10/11/2017  NAME:  Maryana Chinchilla  :  1940  MRN:  [id number]      Admission Summary:   43-year-old woman with a past medical history significant for bilateral lower extremity edema, atrialfibrillation, obstructive sleep apnea, COPD, hypothyroidism, hypertension, depression, chronic kidney disease stage 3, morbid obesity, admitted for acute on chronic diastolic HF. Interval history / Subjective:   Pt seen and examined  Sob stable  Still requiring oxygen and appears dyspneic during converstaion         Assessment & Plan:     Acute on chronic diastolic CHF: Diuresis as per cardiology. On Bumex and metolazone. On Coreg, Losartan, Imdur, spironolactone. Left mod pleural effusion  - Cxr: 10/9 with improvement  - repeat CXR 10/11: no improvement  - Will give Vit K 2.5mg X 1 now, check INR in 6hr  - US guided thoracentesis in AM     A.fib: on coumadin and coreg  - Inr 2.2  - will give Vit K for Planned thoracentesis     Sleep apnea: pt intolerant of BiPAP or CPAP, overnight pulse ox.     Morbid obesity: discussed a about healthy lifestyle modification and comorbidities associated with it.     CKD-3 : monitor renal function with diuresis. monitor electrolytes and replace as needed. Hypokalemia and hypo magnesemia: replaced    Wound care for LE lymphedema with small ulcer.     COPD: nebs and home meds. HTN coreg, losartan  DVT PPX on coumadin. Care Plan discussed with: patient  Disposition: rehab after diuresis     Hospital Problems  Date Reviewed: 10/7/2017          Codes Class Noted POA    * (Principal)Acute on chronic diastolic heart failure ([facility] ICD-10-CM: I50.33  ICD-9-CM: 428.33  10/6/2017 Yes                Review of Systems:   No chest pain, head ache, dizziness. Vital Signs:    Last 24hrs VS reviewed since prior progress note.  Most recent are:  Visit Vitals    /59 (BP 1 Location: Right arm, BP Patient Position: At rest)    Pulse (!) 103    Temp 97.6 °F (36.4 °C)    Resp 18    Ht 5' 9\" (1.753 m)    Wt (!) 175.2 kg (386 lb 3.9 oz)    SpO2 93%    Breastfeeding No    BMI 57.04 kg/m2         Intake/Output Summary (Last 24 hours) at 10/11/17 1115  Last data filed at 10/11/17 0931   Gross per 24 hour   Intake              660 ml   Output             3400 ml   Net            -2740 ml        Physical Examination:       Gen: Not in acute distress  HEENT: atraumatic, normocephalic, moist mucous membranes  CVS: Irregularly irregular   RS: decreased BS on left , good air entry on Rt  GI : obese, NT, ND bowel sounds present   Extremities: LE edematous, lymph edema,  Neuro Alert and oriented x3    Data Review:    Labs reviewed      Labs:     Recent Labs      10/10/17   0508  10/08/17   2007   WBC  6.4  7.0   HGB  12.1  12.1   HCT  37.5  38.7   PLT  261  202     Recent Labs      10/11/17   0334  10/10/17   0508  10/09/17   0407   NA  137  134*  137   K  3.6  4.8  3.2*   CL  92*  93*  95*   CO2  39*  38*  34*   BUN  17  14  13   CREA  1.50*  1.49*  1.44*   GLU  97  99  105*   CA  8.8  8.8  8.4*   MG  1.8  1.9  1.7     No results for input(s): SGOT, GPT, ALT, AP, TBIL, TBILI, TP, ALB, GLOB, GGT, AML, LPSE in the last 72 hours. No lab exists for component: AMYP, HLPSE  Recent Labs      10/11/17   0334  10/10/17   0612  10/09/17   0407   INR  2.2*  2.2*  2.2*   PTP  22.0*  23.1*  22.5*      No results for input(s): FE, TIBC, PSAT, FERR in the last 72 hours. No results found for: FOL, RBCF   No results for input(s): PH, PCO2, PO2 in the last 72 hours. No results for input(s): CPK, CKNDX, TROIQ in the last 72 hours.     No lab exists for component: CPKMB  No results found for: CHOL, CHOLX, CHLST, CHOLV, HDL, LDL, LDLC, DLDLP, TGLX, TRIGL, TRIGP, CHHD, CHHDX  Lab Results   Component Value Date/Time    Glucose (POC) 98 10/07/2017 11:07 AM Glucose (POC) 92 10/07/2017 06:50 AM    Glucose (POC) 169 12/05/2016 04:43 PM    Glucose (POC) 126 12/05/2016 12:15 PM    Glucose (POC) 112 12/05/2016 08:23 AM     Lab Results   Component Value Date/Time    Color DARK YELLOW 12/02/2016 05:46 PM    Appearance TURBID 12/02/2016 05:46 PM    Specific gravity 1.022 12/02/2016 05:46 PM    Specific gravity 1.015 03/16/2012 01:55 AM    pH (UA) 5.0 12/02/2016 05:46 PM    Protein 30 12/02/2016 05:46 PM    Glucose NEGATIVE  12/02/2016 05:46 PM    Ketone TRACE 12/02/2016 05:46 PM    Bilirubin NEGATIVE  07/08/2012 12:10 AM    Urobilinogen 1.0 12/02/2016 05:46 PM    Nitrites NEGATIVE  12/02/2016 05:46 PM    Leukocyte Esterase NEGATIVE  12/02/2016 05:46 PM    Epithelial cells MANY 12/02/2016 05:46 PM    Bacteria 1+ 12/02/2016 05:46 PM    WBC 0-4 12/02/2016 05:46 PM    RBC 0-5 12/02/2016 05:46 PM         Medications Reviewed:     Current Facility-Administered Medications   Medication Dose Route Frequency    Warfarin-Hold dose today   Other ONCE    potassium chloride SR (KLOR-CON 10) tablet 20 mEq  20 mEq Oral BID    bisacodyl (DULCOLAX) tablet 5 mg  5 mg Oral DAILY    spironolactone (ALDACTONE) tablet 25 mg  25 mg Oral DAILY    isosorbide mononitrate ER (IMDUR) tablet 30 mg  30 mg Oral DAILY    losartan (COZAAR) tablet 25 mg  25 mg Oral DAILY    influenza vaccine 2017-18 (3 yrs+)(PF) (FLUZONE QUAD/FLUARIX QUAD) injection 0.5 mL  0.5 mL IntraMUSCular PRIOR TO DISCHARGE    carvedilol (COREG) tablet 25 mg  25 mg Oral BID WITH MEALS    metOLazone (ZAROXOLYN) tablet 5 mg  5 mg Oral DAILY    nystatin (MYCOSTATIN) 100,000 unit/gram powder   Topical TID    allopurinol (ZYLOPRIM) tablet 100 mg  100 mg Oral DAILY    docusate (COLACE) 50 mg/5 mL oral liquid 50 mg  50 mg Oral BID PRN    gabapentin (NEURONTIN) capsule 400 mg  400 mg Oral QHS    guaiFENesin ER (MUCINEX) tablet 600 mg  600 mg Oral BID    lactobac ac& pc-s.therm-b.anim (PHI Q/RISAQUAD)  1 Cap Oral DAILY    levothyroxine (SYNTHROID) tablet 112 mcg  112 mcg Oral ACB    loratadine (CLARITIN) tablet 10 mg  10 mg Oral DAILY    pantoprazole (PROTONIX) tablet 40 mg  40 mg Oral DAILY    venlafaxine (EFFEXOR) tablet 75 mg  75 mg Oral BID    sodium chloride (NS) flush 5-10 mL  5-10 mL IntraVENous Q8H    sodium chloride (NS) flush 5-10 mL  5-10 mL IntraVENous PRN    ondansetron (ZOFRAN) injection 4 mg  4 mg IntraVENous Q4H PRN    albuterol-ipratropium (DUO-NEB) 2.5 MG-0.5 MG/3 ML  3 mL Nebulization Q4H PRN    bumetanide (BUMEX) injection 2 mg  2 mg IntraVENous BID    warfarin - pharmacy to dose   Other Rx Dosing/Monitoring    acetaminophen (TYLENOL) tablet 650 mg  650 mg Oral Q4H PRN     ______________________________________________________________________  EXPECTED LENGTH OF STAY: 4d 12h  ACTUAL LENGTH OF STAY:          Chris Hollins MD

## 2017-10-11 NOTE — PROGRESS NOTES
0730: Bedside and Verbal shift change report given to JAGUAR Caro (oncoming nurse) by Tanner Urieb (offgoing nurse). Report included the following information SBAR, Kardex, Intake/Output, MAR, Recent Results and Cardiac Rhythm A Fib with PVCs. Problem: Pressure Injury - Risk of  Goal: *Prevention of pressure ulcer  Outcome: Progressing Towards Goal  Turned Q4H; skin assessed Q4H; blood glucose controlled        Problem: Falls - Risk of  Goal: *Absence of Falls  Document Jacob Fall Risk and appropriate interventions in the flowsheet. Fall Risk Interventions:     Medication Interventions: Evaluate medications/consider consulting pharmacy, Patient to call before getting OOB     Elimination Interventions: Call light in reach, Patient to call for help with toileting needs, Toileting schedule/hourly rounds     History of Falls Interventions: Consult care management for discharge planning, Door open when patient unattended, Investigate reason for fall, Room close to nurse's station     Call bell and personal effects within reach. Bed locked and in low position. Non skid footwear in place.            Problem: Heart Failure: Day 5  Goal: Activity/Safety  Outcome: Progressing Towards Goal  Patient unable to ambulate  Goal: Diagnostic Test/Procedures  Outcome: Progressing Towards Goal  Labs drawn as ordered and monitored for results  Goal: Respiratory  Outcome: Progressing Towards Goal  Weaned O2 to 1L NC  Goal: Treatments/Interventions/Procedures  Outcome: Progressing Towards Goal  Vitals obtaine Q4H; daily weights  Goal: Psychosocial  Outcome: Progressing Towards Goal  Reassurance and emotional support offered

## 2017-10-11 NOTE — PROGRESS NOTES
Cardiology Progress Note  10/11/2017     Admit Date: 10/6/2017  Admit Diagnosis: Acute on chronic diastolic heart failure (HCC)  CC: none currently    Assessment/Plan:     1. Acute on chronic diastolic heart failure:  Cont diuresis. Agree with plan for thoracentesis tomorrow after reversal of anticoagulation. Med noncompliance has been an ongoign issue per the chart    2. Atrial fibrillation:  Cont coreg for rate control. On coumadin, though holding for procedure    3. Morbid obesity:   Discussed diet. Subjective:      Charisse Enriquez was admitted several days ago and Dr. Brock Escoto has been seeing her in the hospital.  She has a known h/o diastolic heart failure and morbid obesity and was admitted after several weeks of medication noncompliance. She has been diuresing in hospital and is scheduled for ultrasound guided thoracentesis tomorrow. She has mild improvement in dyspnea, she tells me. I have not seen her prior to today. Objective:    Physical Exam:  Overall VSSAF;    Visit Vitals    /48 (BP 1 Location: Right arm, BP Patient Position: Sitting)    Pulse (!) 106    Temp 98 °F (36.7 °C)    Resp 18    Ht 5' 9\" (1.753 m)    Wt (!) 175.2 kg (386 lb 3.9 oz)    SpO2 94%    Breastfeeding No    BMI 57.04 kg/m2     Temp (24hrs), Av.8 °F (36.6 °C), Min:97.5 °F (36.4 °C), Max:98.1 °F (36.7 °C)    Patient Vitals for the past 8 hrs:   Pulse   10/11/17 1202 (!) 106   10/11/17 0759 (!) 103    Patient Vitals for the past 8 hrs:   Resp   10/11/17 1202 18   10/11/17 0759 18    Patient Vitals for the past 8 hrs:   BP   10/11/17 1202 101/48   10/11/17 0759 117/59      10/09 1901 - 10/11 0700  In: 1180 [P.O.:1180]  Out: 1949 [Urine:5775]      General Appearance: Morbidly obese   Ears/Nose/Mouth/Throat:   Normal MM; anicteric.      JVP: WNL   Resp:   Diminished breath sounds on left, minimal wheezing   Cardiovascular:  Irregularly irregular, tachycardic   Abdomen:   Soft, non-tender, bowel sounds are present. Extremities: No edema bilaterally. Skin:  Neuro: Warm and dry.   A/O x3, grossly nonfocal                         Data Review:     Telemetry a fib, heart rate 100  Labs:   Recent Results (from the past 24 hour(s))   PROTHROMBIN TIME + INR    Collection Time: 10/11/17  3:34 AM   Result Value Ref Range    INR 2.2 (H) 0.9 - 1.1      Prothrombin time 22.0 (H) 9.0 - 51.4 sec   METABOLIC PANEL, BASIC    Collection Time: 10/11/17  3:34 AM   Result Value Ref Range    Sodium 137 136 - 145 mmol/L    Potassium 3.6 3.5 - 5.1 mmol/L    Chloride 92 (L) 97 - 108 mmol/L    CO2 39 (H) 21 - 32 mmol/L    Anion gap 6 5 - 15 mmol/L    Glucose 97 65 - 100 mg/dL    BUN 17 6 - 20 MG/DL    Creatinine 1.50 (H) 0.55 - 1.02 MG/DL    BUN/Creatinine ratio 11 (L) 12 - 20      GFR est AA 41 (L) >60 ml/min/1.73m2    GFR est non-AA 34 (L) >60 ml/min/1.73m2    Calcium 8.8 8.5 - 10.1 MG/DL   MAGNESIUM    Collection Time: 10/11/17  3:34 AM   Result Value Ref Range    Magnesium 1.8 1.6 - 2.4 mg/dL      Current medications reviewed       Auerlio Mosqueda MD

## 2017-10-11 NOTE — PROGRESS NOTES
Problem: Self Care Deficits Care Plan (Adult)  Goal: *Acute Goals and Plan of Care (Insert Text)  Occupational Therapy Goals  Initiated 10/9/2017  1. Patient will perform sitting unsupported 1 min during upper body ADLs with maximal assistance within 7 day(s). 2. Patient will perform bathing upper body to thighs with setup within 7 day(s). 3. Patient will perform rolling in bed toilet transfers with maximal assistance within 7 day(s). 4. Patient will perform all aspects of toileting with maximal assistance within 7 day(s). 5. Patient will participate in upper extremity therapeutic exercise/activities with light resistance supervision/set-up for 5 minutes within 7 day(s). OCCUPATIONAL THERAPY TREATMENT  Patient: Shilpa Macario (89 y.o. female)  Date: 10/11/2017  Diagnosis: Acute on chronic diastolic heart failure (HCC) Acute on chronic diastolic heart failure (HCC)       Precautions: Fall      ASSESSMENT:  Up in stretcher chair post earlier PT session, setup for bathing task, able to complete UB bathing with increased time, -100s, SPO2 92-96% during activity, some dyspnea with conversation during task, multiple rest breaks given. Mod A for perineal hygiene for completion of task with assist for powder at skin fold/pannus at abdominal area, pure wick in place, patient at times anxious about this and its purpose/use. Educated on purpse of building strength and endurance with completion of ADL task in this seated positioning. Tolerated well, continue to recommend rehab at discharge. Progression toward goals:  [X]       Improving appropriately and progressing toward goals  [ ]       Improving slowly and progressing toward goals  [ ]       Not making progress toward goals and plan of care will be adjusted       PLAN:  Patient continues to benefit from skilled intervention to address the above impairments. Continue treatment per established plan of care.   Discharge Recommendations:  Rehab  Further Equipment Recommendations for Discharge:  TBD       SUBJECTIVE:   Patient stated Wouldnt you be tired if you've been in bed this whole time too? Saraisusana Pate      OBJECTIVE DATA SUMMARY:   Cognitive/Behavioral Status:  Neurologic State: Alert; Appropriate for age  Orientation Level: Oriented X4  Cognition: Appropriate decision making; Appropriate for age attention/concentration; Appropriate safety awareness; Follows commands              Functional Mobility and Transfers for ADLs:  Bed Mobility:  Scooting: Other (comment) (A x 3 - Maximal assist; Patient unable to bridge)     Transfers:           Balance:  Sitting: Impaired; Without support  Sitting - Static: Good (unsupported)  Sitting - Dynamic: Fair (occasional)  Standing:  (Unable)     ADL Intervention:  Feeding  Feeding Assistance: Independent  Food to Mouth: Independent  Drink to Mouth: Independent     Grooming  Washing Face: Supervision/set-up     Upper Body Bathing  Bathing Assistance: Supervision/set-up  Position Performed: Seated in chair  Cues: Mirror for visual feedback     Lower Body Bathing  Perineal  : Moderate assistance  Position Performed: Seated in chair  Adaptive Equipment: Other(comment) (mirror for improved view)     Upper Body [address] Gown: Minimum  assistance     Lower Body Dressing Assistance  Socks: Total assistance (dependent)     Toileting  Bladder Hygiene: Maximum assistance  Clothing Management: Total assistance (dependent)           Neuro Re-Education:           Therapeutic Exercises:   encouraged OOB and full participation with ADLs to improve strength and endurance. Pain:  Pain Scale 1: Numeric (0 - 10)  Pain Intensity 1: 2  Pain Location 1: Back  Pain Orientation 1: Posterior  Pain Description 1: Aching  Pain Intervention(s) 1: Repositioned; Rest  Activity Tolerance:   Poor  Please refer to the flowsheet for vital signs taken during this treatment.   After treatment:   [X] Patient left in no apparent distress sitting up in chair  [ ] Patient left in no apparent distress in bed  [X] Call bell left within reach  [X] Nursing notified  [ ] Caregiver present  [ ] Bed alarm activated      COMMUNICATION/COLLABORATION:   The patients plan of care was discussed with: Physical Therapist, Registered Nurse and Rehabilitation Sourav Golden, CESIA  Time Calculation: 48 mins

## 2017-10-11 NOTE — CARDIO/PULMONARY
Cardiac Wellness: Heart Failure education folder given to Gogo Garza. Educated using teach back method. Discussed diagnosis definition and assessed patient understanding. Reviewed importance of daily weight monitoring and low sodium diet (less than 1500 mg. daily). Encouraged activity and rest periods within symptom limitations and as ordered by physician. Reviewed lasix, purpose of medication, potential side effects and what to do if dose is missed. Discussed importance of reporting signs and symptoms of exacerbation and when to report them to the doctor to prevent re-hospitalization. Gogo Garza was encouraged to keep all appointments with doctor. Discussed ability to obtain prescription meds and encouraged conversations with physician if unable to do so. Smoking history assessed. Pt is a non smoker. HF teach back questions answered by patient. Pt. Demonstrated proper use of IS but was only able to achieve 250ml. HUDSON Benito could benefit from further education on the following HF topics: medication compliance and IS.

## 2017-10-11 NOTE — PROGRESS NOTES
0730: Bedside and Verbal shift change report given to JAGUAR RN (oncoming nurse) by DE [facility], RN (offgoing nurse). Report included the following information SBAR, Kardex, Intake/Output, MAR, Recent Results, Med Rec Status and Cardiac Rhythm A Fib.   0840: Carlos Freitas MD at bedside. Orders received to give 2.5 mg of IV Vitamin K to reverse Coumadin in order for patient to get L thoracentesis tomorrow AM. Will recheck INR at 1600.   0940: Spoke with Carlos Freitas MD regarding patient's urine color changing from yellow to pink tinged. No new orders received. Mentioned to MD that patient is not having the back pain she was experiencing yesterday. Will continue to monitor. 1530: Bedside and Verbal shift change report given to Betito Reed RN (oncoming nurse) by HUDSON MONTESINOS (offgoing nurse). Report included the following information SBAR, Kardex, Intake/Output, MAR, Recent Results, Med Rec Status and Cardiac Rhythm A Fib.

## 2017-10-11 NOTE — PROGRESS NOTES
Spiritual Care Assessment/Progress Notes    Odell Mancera [id number]  [id number]    1940  69 y.o.  female    Patient Telephone Number: [phone] (home)   Lutheran Affiliation: Spiritism   Language: English   Extended Emergency Contact Information  Primary Emergency Contact: Bailey Holt Phone: [phone]  Mobile Phone: [phone]  Relation: Spouse   Patient Active Problem List    Diagnosis Date Noted    Acute on chronic diastolic heart failure ([facility] 10/06/2017    A-fib ([facility] 12/02/2016    Physical deconditioning 03/19/2014    Breast CA ([facility] 07/15/2013    Recurrent epistaxis 89/41/0054    Diastolic CHF, chronic ([facility] 10/01/2012    Atrial fibrillation ([facility] 10/01/2012    CKD (chronic kidney disease) stage 3, GFR 30-59 ml/min 10/01/2012    Hypovolemia 10/01/2012    Hypokalemia 10/01/2012    History of complete heart block 10/01/2012    History of Clostridium difficile infection 89/76/8125    Diastolic CHF, acute on chronic ([facility] 05/04/2012    Hypokalemia 05/04/2012    CKD (chronic kidney disease) stage 3, GFR 30-59 ml/min 05/04/2012    Sepsis(995.91) 05/03/2012    Intestinal infection due to Clostridium difficile 05/03/2012    Complete heart block ([facility] 04/05/2012    ARF (acute renal failure) ([facility] 03/25/2012    Diarrhea 03/25/2012    Anemia 03/19/2012    Acute on chronic renal failure ([facility] 03/16/2012    Unspecified hypothyroidism 03/16/2012    Chronic airway obstruction, not elsewhere classified 03/16/2012    Hypertensive heart disease with heart failure (HCC)     Pleural effusion     Elevated antinuclear antibody (MARYAM) level     Other dyspnea and respiratory abnormality     Other lymphedema     Morbid obesity with BMI of 50.0-59.9, adult ([facility]     Lymphedema     Atrial fibrillation ([facility]         Date: 10/11/2017       Level of Lutheran/Spiritual Activity:  []         Involved in moustapha tradition/spiritual practice    []         Not involved in moustapha tradition/spiritual practice  [x]         Spiritually oriented    []         Claims no spiritual orientation    []         seeking spiritual identity  []         Feels alienated from Sabianist practice/tradition  []         Feels angry about Sabianist practice/tradition  []         Spirituality/Sabianist tradition is a resource for coping at this time. []         Not able to assess due to medical condition    Services Provided Today:  []         crisis intervention    []         reading Scriptures  []         spiritual assessment    []         prayer  []         empathic listening/emotional support  []         rites and rituals (cite in comments)  []         life review     []         Sabianist support  []         theological development   []         advocacy  []         ethical dialog     []         blessing  []         bereavement support    []         support to family  []         anticipatory grief support   [x]         help with AMD  []         spiritual guidance    []         meditation      Spiritual Care Needs  []         Emotional Support  []         Spiritual/Lutheran Care  []         Loss/Adjustment  []         Advocacy/Referral                /Ethics  [x]         No needs expressed at               this time  []         Other: (note in               comments)  [address]  []         Follow up visits with               pt/family  []         Provide materials  []         Schedule sacraments  []         Contact Community               Clergy  [x]         Follow up as needed  []         Other: (note in               comments)     Comments:  is responding to request for AMD with pt in room 456. Pt states she has an AMD on file at home. Her concern is making sure she is not a DNR, and would be full code.  confirmed this as reflected in her chart.  She wants to make sure she'd have resuscitating efforts, initially, if something happened to her, and then wants to make sure her Living Will is respected honoring her wishes for no heroic measures, if it is determined that she is imminently dying without a hope of recovery. She will have her  retrieve her AMD and bring to the hospital to have on file.  followed up with  for discussion about pt's AMD status.      [address] Samantha Holliday M.Div, M.S, [address] [address] available at [facility]([phone])

## 2017-10-11 NOTE — PROGRESS NOTES
Attempted to speak with patient this am about SNF choices - requested for CM to return this afternoon when her  visits - discussed and offered option of an AMD - patient is agreeable to receiving information to review when she is up to reviewing it - pastoral care will bring information to patient this afternoon.  BRANDON Thornton

## 2017-10-11 NOTE — PROGRESS NOTES
Problem: Pressure Injury - Risk of  Goal: *Prevention of pressure ulcer  Outcome: Progressing Towards Goal  Pt turned and repositioned Q2H for pressure ulcer prevention. No s/sx of pressure ulcer noted at this time. Problem: Falls - Risk of  Goal: *Absence of Falls  Document Jacob Fall Risk and appropriate interventions in the flowsheet. Outcome: Progressing Towards Goal  Pt bed ridden and unable to get up on own without assistance. Will work with PT today to get her up and out of bed into the chair. Fall Risk Interventions:              Medication Interventions: Bed/chair exit alarm, Evaluate medications/consider consulting pharmacy, Patient to call before getting OOB     Elimination Interventions: Call light in reach, Elevated toilet seat, Patient to call for help with toileting needs     History of Falls Interventions: Consult care management for discharge planning, Door open when patient unattended, Evaluate medications/consider consulting pharmacy           Problem: Heart Failure: Discharge Outcomes  Goal: *Demonstrates ability to perform prescribed activity without shortness of breath or discomfort  Outcome: Not Progressing Towards Goal  Pt short of breath on exertion and at rest. Pt unable to get up at this time as patient is wheelchair bound. Pt will work with PT today to get up to chair. Goal: *Verbalizes understanding and describes prescribed diet  Outcome: Progressing Towards Goal  Pt verbalizes understanding of cardiac diet. Goal: *Verbalizes understanding/describes prescribed medications  Outcome: Not Progressing Towards Goal  Pt verbalizes understanding of prescribed medications but is not compliant with taking them at home. Will continue to educate pt.    Goal: *Describes available resources and support systems  (eg: Home Health, Palliative Care, Advanced Medical Directive)   Outcome: Progressing Towards Goal  Pt describes available resources she has at home such as home health and supportive family members that assist with her care.

## 2017-10-11 NOTE — PROGRESS NOTES
Problem: Mobility Impaired (Adult and Pediatric)  Goal: *Acute Goals and Plan of Care (Insert Text)  Physical Therapy Goals  Initiated 10/7/2017  1. Patient will move from supine to sit and sit to supine , scoot up and down and roll side to side in bed with moderate assistance within 7 day(s). 2. Patient will transfer from bed to chair and chair to bed with maximal assistance using the least restrictive device within 7 day(s). 3. Patient will perform sit to stand with maximal assistance within 7 day(s). PHYSICAL THERAPY TREATMENT  Patient: Henrique Britt (22 y.o. female)  Date: 10/11/2017  Diagnosis: Acute on chronic diastolic heart failure (HCC) Acute on chronic diastolic heart failure (HCC)       Precautions: Fall      ASSESSMENT:  Patient received lying in bed, HOB elevated. Patient agreeable to participation in therapy session. Noted gown and LEs saturated with urine - assisted in gown change. Patient able to wipe her inner thighs and stomach with a warm soapy cloth, but required extra time, several rest breaks, positive reinforcement, and PLBing cuing. Patient able to long sit with overall minimal assistance. Patient's functional mobility limited by her anxiety, mild dyspnea, intermittent participation/effort, BLE lymphedema and body circumference/weight (380 lbs). Patient able to complete ankle pumps x 10 and heel slides x 5 on the right; patient complaining of L knee pain (admitted to a fall just prior to admission), and therefore, refusing to attempt heel slides on the left; no erythema or ecchymosis observed in L knee. Patient able to advance shoulders laterally with modified independence in supine, but required moderate-maximal assist to advance her LEs laterally. Patient unable to bridge in supine, therefore, patient required maximal assist x 3 to laterally scoot into stretcher chair (took over 1 hour to achieve).  Patient required maximal assist x 1 to roll L/R in order to remove saturated sheets and reposition chuxs. Demonstrated appropriate techniques for patient to utilize in order to off-load buttocks while sitting up in chair; patient demonstrated well  Patient to remain on therapy caseload 3x/week. Recommend holger lift utilization to/from bed <> stretcher chair. Progression toward goals:  [ ]    Improving appropriately and progressing toward goals  [X]    Improving slowly and progressing toward goals  [ ]    Not making progress toward goals and plan of care will be adjusted       PLAN:  Patient continues to benefit from skilled intervention to address the above impairments. Continue treatment per established plan of care. Discharge Recommendations:  Skilled Nursing Facility  Further Equipment Recommendations for Discharge:  TBD at SNF       SUBJECTIVE:   Patient stated Girls, I need you to do more.       OBJECTIVE DATA SUMMARY:   Critical Behavior:  Neurologic State: Alert, Appropriate for age  Orientation Level: Oriented X4  Cognition: Appropriate decision making, Appropriate for age attention/concentration, Appropriate safety awareness, Follows commands  Safety/Judgement: Awareness of environment, Good awareness of safety precautions  Functional Mobility Training:  Bed Mobility:  Scooting: Other (comment) (A x 3 - Maximal assist; Patient unable to bridge)  Balance:  Sitting: Impaired; Without support  Sitting - Static: Good (unsupported)  Sitting - Dynamic: Fair (occasional)  Standing:  (Unable)     Pain:  Pain Scale 1: Numeric (0 - 10)  Pain Intensity 1: 2  Pain Location 1: Back  Pain Orientation 1: Posterior  Pain Description 1: Aching  Pain Intervention(s) 1: Repositioned; Rest      Activity Tolerance:   Please refer to the flowsheet for vital signs taken during this treatment.   After treatment:   [X]    Patient left in no apparent distress sitting up in chair  [ ]    Patient left in no apparent distress in bed  [X]    Call bell left within reach  [X]    Nursing notified  [ ]    Caregiver present  [ ]    Bed alarm activated      COMMUNICATION/COLLABORATION:   The patients plan of care was discussed with: Registered Nurse     Inga Fitzgerald PT, DPT   Time Calculation: 60 mins

## 2017-10-11 NOTE — CONSULTS
Consultation Note    NAME: Nova Guo   :  1940   MRN:  [id number]     Date/Time:  10/11/2017 11:05 AM    I have been asked to see this patient by Dr. Dany Gonzales  for advice/opinion re: CKD-3/4. Assessment :    Plan:  CKD-3/4-Dr. Ruth-baseline creatinine around 1.7   A. Fib  Pleural effusion  Noncompliance with meds and office visits. Volume overload Creatinine has increased with needed diuresis this admission. I expect it to continue to increase toward her baseline as she continues to diurese. Subjective:   CHIEF COMPLAINT:  \"I stopped taking my meds. \"    HISTORY OF PRESENT ILLNESS:     Rui Pabon is a 69 y.o.   female who has a history of CKD-3/4 admitted with volume overload on 10.6. She says that she stopped her meds for \"a while\" as they make her urinate a lot and she finds that bothersome. Dr. Franchesca Mosley says that this noncompliance is common for the patient. The patient says that since stopping her meds she has noted progressive weakness, dyspnea, and bilateral leg edema. She does not limit salt in her diet. On admission her creatinine was 1.25 and it has increased to 1.5 today with needed diuresis.     Past Medical History:   Diagnosis Date    Arrhythmia     A Fib    Arthritis     Atrial fibrillation (HCC)     Cancer (HCC)     left breast cancer    Cardiomegaly     CKD (chronic kidney disease) stage 3, GFR 30-59 ml/min     judith    Clostridium difficile infection     Congestive heart failure, unspecified ([address]     COPD     Diarrhea     /constipation    Dyspepsia and other specified disorders of function of stomach     Elevated antinuclear antibody (MARYAM) level     Endocrine disease     hypothyroidism    GERD (gastroesophageal reflux disease)     Hearing loss     Heart failure (HCC)     Hypertension     Hypertensive heart disease with heart failure (HCC)     diastolic dysfunction    Joint pain     Leg swelling     Long term (current) use of anticoagulants     Lymphedema     Morbid obesity ([facility] [address]     Obesity, unspecified     Other ill-defined conditions     lymph edema    Pleural effusion     s/p VATS    Shortness of breath     Thyroid disease     Unspecified sleep apnea     Does not use machine      Past Surgical History:   Procedure Laterality Date    BREAST SURGERY PROCEDURE UNLISTED  13    LEFT BREAST MASTECTOMY SENTINEL NODE BIOPSY      HX BREAST BIOPSY      left    HX BREAST BIOPSY      fibroids removed right breast    HX  SECTION      times 2    HX KNEE ARTHROSCOPY      left knee     HX MODIFIED RADICAL MASTECTOMY  2013    BREAST MASTECTOMY MODIFIED RADICAL performed by Lee Bray MD at [facility] MA[facility] OR    HX OTHER SURGICAL      pleural effusion    HX RHINOPLASTY      HX DOMI AND BSO      HX TONSILLECTOMY       Social History   Substance Use Topics    Smoking status: Never Smoker    Smokeless tobacco: Never Used    Alcohol use No      Family History   Problem Relation Age of Onset    Lung Disease Mother      Emphysema    Heart Disease Mother     Hypertension Mother     Cancer Father      Lung Cancer    Heart Disease Father     Hypertension Father     Cancer Paternal Aunt      colon      Allergies   Allergen Reactions    Latex Rash    Grass Pollen-[place], Standard Unknown (comments)    Sulfa (Sulfonamide Antibiotics) Unknown (comments)      Prior to Admission medications    Medication Sig Start Date End Date Taking? Authorizing Provider   warfarin (COUMADIN) 2.5 mg tablet Take 2.5 mg by mouth four (4) days a week. (2.5 mg on Ywg-Whw-Focpw-Sat; 5 mg on Tues-Fri-Sun)   Yes Historical Provider   warfarin (COUMADIN) 5 mg tablet Take 5 mg by mouth three (3) days a week. (2.5 mg on Pvc-Zrg-Mqhnh-Sat; 5 mg on Tues-Fri-Sun)   Yes Historical Provider   guaiFENesin ER (MUCINEX) 600 mg ER tablet Take 600 mg by mouth two (2) times a day.    Yes Historical Provider   docusate sodium (COLACE) 50 mg capsule Take 50 mg by mouth two (2) times daily as needed for Constipation. Yes Historical Provider   ergocalciferol (ERGOCALCIFEROL) 50,000 unit capsule Take 50,000 Units by mouth. Yes Historical Provider   levothyroxine (SYNTHROID) 112 mcg tablet Take 1 Tab by mouth Daily (before breakfast). 12/7/16  Yes Renee Mcgraw MD   metoprolol tartrate (LOPRESSOR) 50 mg tablet Take 1 Tab by mouth two (2) times a day. 12/7/16  Yes Renee Mcgraw MD   allopurinol (ZYLOPRIM) 100 mg tablet Take 100 mg by mouth daily. Yes Eva Jackson MD   FUROSEMIDE PO Take 120 mg by mouth two (2) times a day. Yes Historical Provider   POTASSIUM CHLORIDE (KLOR-CON PO) Take 20 mEq by mouth two (2) times a day. Yes Historical Provider   FERROUS SULFATE, DRIED (IRON, DRIED, PO) Take 65 mg by mouth two (2) times a day. Yes Historical Provider   LACTOBACILLUS RHAMNOSUS GG (PROBIOTIC PO) Take  by mouth. 15 billion    Yes Historical Provider   venlafaxine (EFFEXOR) 75 mg tablet Take 75 mg by mouth two (2) times a day. Yes Historical Provider   loratadine (CLARITIN) 10 mg tablet Take 10 mg by mouth daily. Yes Historical Provider   gabapentin (NEURONTIN) 400 mg capsule Take 400 mg by mouth nightly. Yes Historical Provider   pantoprazole (PROTONIX) 40 mg tablet Take 40 mg by mouth daily.    Yes Historical Provider     REVIEW OF SYSTEMS:     []  Unable to obtain reliable ROS due to  [] mental status  [] sedated   [] intubated   [x] Total of 12 systems reviewed as follows:  Constitutional: negative fever, negative chills, negative weight loss  Eyes:   negative visual changes  ENT:   negative sore throat, tongue or lip swelling  Respiratory:  negative cough, pos dyspnea  Cards:  negative for chest pain, palpitations, pos lower extremity edema  GI:   negative for nausea, vomiting, diarrhea, and abdominal pain  :  negative for frequency, dysuria  Integument:  negative for rash and pruritus  Heme:  negative for easy bruising and gum/nose bleeding  Musculoskel: negative for myalgias,  back pain and muscle weakness  Neuro:  negative for headaches, dizziness, vertigo  Psych:  negative for feelings of anxiety, depression   Travel?: none    Objective:   VITALS:    Visit Vitals    /59 (BP 1 Location: Right arm, BP Patient Position: At rest)    Pulse (!) 103    Temp 97.6 °F (36.4 °C)    Resp 18    Ht 5' 9\" (1.753 m)    Wt (!) 175.2 kg (386 lb 3.9 oz)    SpO2 93%    Breastfeeding No    BMI 57.04 kg/m2     PHYSICAL EXAM:  Gen:  []  WD []  WN  [] cachectic []  thin []  obese []  disheveled             [x]  ill apearing  []   Critical  []   Chronic    []  No acute distress    HEENT:   [x] NC/AT/PERRL    [x] pink conjunctivae      [] pale conjunctivae                  PERRL  [] yes  [] no      [] moist mucosa    [] dry mucosa    hearing intact to voice [] yes  [] No                 NECK:   supple [x] yes  [] no        masses [] yes  [x] No               thyroid  []  non tender  []  tender    RESP:   [] CTA bilaterally/no wheezing/rhonchi/rales/crackles    [x] rhonchi bilaterally - no dullness  [] wheezing   [] rhonchi   [] crackles     use of accessory muscles [] yes [] no    CARD:   [x]  regular rate and rhythm/No murmurs/rubs/gallops    murmur  [] yes ()  [] no      Rubs  [] yes  [] no       Gallops [] yes  [] no    Rate []  regular  []  irregular        carotid bruits  [] Right  []  Left                 LE edema [x] yes  [] no           JVP  []  yes   []  no    ABD:    [x] soft/non distended/non tender/+bowel sounds/no HSM    []  Rigid    tenderness [] yes [] no   Liver enlargement  []  yes []  no                Spleen enlargement  []  yes []  no     distended []  yes [] no     bowel sound  [] hypoactive   [] hyperactive    LYMPH:    Neck []  yes [x]  no       Axillae []  yes [x]  no    SKIN:   Rashes []  yes   [x]  no    Ulcers []  yes   [x]  no               [] tight to palpitation    skin turgor []  good  [] poor  [] decreased               Cyanosis/clubbing []  yes [] no    NEUR:   [] cranial nerves II-XII grossly intact       [] Cranial nerves deficit                 []  facial droop    []  slurred speech   [] aphasic     [] Strength normal     []  weakness  []  LUE  []   RUE/ []  LLE  []   RLE    follows commands  [x]  yes []  no           PSYCH:   insight [] poor [x] good   Alert and Oriented to  [x] person  [x] place  [x]  time                    [] depressed [] anxious [] agitated  [] lethargic [] stuporous  [] sedated     LAB DATA REVIEWED:    Recent Labs      10/10/17   0508  10/08/17   2007   WBC  6.4  7.0   HGB  12.1  12.1   HCT  37.5  38.7   PLT  261  202     Recent Labs      10/11/17   0334  10/10/17   0508  10/09/17   0407   NA  137  134*  137   K  3.6  4.8  3.2*   CL  92*  93*  95*   CO2  39*  38*  34*   BUN  17  14  13   CREA  1.50*  1.49*  1.44*   GLU  97  99  105*   CA  8.8  8.8  8.4*   MG  1.8  1.9  1.7     No results for input(s): SGOT, GPT, ALT, AP, TBIL, TBILI, ALB, GLOB, GGT, AML, LPSE in the last 72 hours. No lab exists for component: AMYP, HLPSE  Recent Labs      10/11/17   0334  10/10/17   0612  10/09/17   0407   INR  2.2*  2.2*  2.2*   PTP  22.0*  23.1*  22.5*      No results for input(s): FE, TIBC, PSAT, FERR in the last 72 hours. No results for input(s): PH, PCO2, PO2 in the last 72 hours. No results for input(s): CPK, CKMB in the last 72 hours.     No lab exists for component: TROPONINI  Lab Results   Component Value Date/Time    Glucose (POC) 98 10/07/2017 11:07 AM    Glucose (POC) 92 10/07/2017 06:50 AM    Glucose (POC) 169 12/05/2016 04:43 PM    Glucose (POC) 126 12/05/2016 12:15 PM    Glucose (POC) 112 12/05/2016 08:23 AM       Procedures: see electronic medical records for all procedures/Xrays and details which were not copied into this note but were reviewed prior to creation of Plan.    ________________________________________________________________________       ___________________________________________________  Consulting Physician: Yury Garcia MD

## 2017-10-12 ENCOUNTER — APPOINTMENT (OUTPATIENT)
Dept: ULTRASOUND IMAGING | Age: 77
DRG: 291 | End: 2017-10-12
Attending: INTERNAL MEDICINE
Payer: MEDICARE

## 2017-10-12 ENCOUNTER — APPOINTMENT (OUTPATIENT)
Dept: GENERAL RADIOLOGY | Age: 77
DRG: 291 | End: 2017-10-12
Attending: RADIOLOGY
Payer: MEDICARE

## 2017-10-12 LAB
ANION GAP SERPL CALC-SCNC: 6 MMOL/L (ref 5–15)
APPEARANCE FLD: ABNORMAL
BUN SERPL-MCNC: 18 MG/DL (ref 6–20)
BUN/CREAT SERPL: 11 (ref 12–20)
CALCIUM SERPL-MCNC: 9.1 MG/DL (ref 8.5–10.1)
CHLORIDE SERPL-SCNC: 91 MMOL/L (ref 97–108)
CO2 SERPL-SCNC: 39 MMOL/L (ref 21–32)
COLOR FLD: ABNORMAL
CREAT SERPL-MCNC: 1.59 MG/DL (ref 0.55–1.02)
EOSINOPHIL NFR FLD MANUAL: 35 %
GLUCOSE FLD-MCNC: 108 MG/DL
GLUCOSE SERPL-MCNC: 87 MG/DL (ref 65–100)
INR PPP: 1.3 (ref 0.9–1.1)
LDH FLD L TO P-CCNC: 476 U/L
LDH SERPL L TO P-CCNC: 551 U/L (ref 81–246)
LYMPHOCYTES NFR FLD: 18 %
MESOTHL CELL NFR FLD: 6 %
MONOS+MACROS NFR FLD: 25 %
NEUTROPHILS NFR FLD: 16 %
NUC CELL # FLD: 4121 /CU MM (ref 0–5)
POTASSIUM SERPL-SCNC: 3.4 MMOL/L (ref 3.5–5.1)
PROT FLD-MCNC: 3.9 G/DL
PROT SERPL-MCNC: 7 G/DL (ref 6.4–8.2)
PROTHROMBIN TIME: 13.6 SEC (ref 9–11.1)
RBC # FLD: >100 /CU MM
SODIUM SERPL-SCNC: 136 MMOL/L (ref 136–145)
SPECIMEN SOURCE FLD: ABNORMAL
SPECIMEN SOURCE FLD: NORMAL

## 2017-10-12 PROCEDURE — 0W9B3ZZ DRAINAGE OF LEFT PLEURAL CAVITY, PERCUTANEOUS APPROACH: ICD-10-PCS | Performed by: RADIOLOGY

## 2017-10-12 PROCEDURE — 87205 SMEAR GRAM STAIN: CPT

## 2017-10-12 PROCEDURE — 74011000250 HC RX REV CODE- 250: Performed by: HOSPITALIST

## 2017-10-12 PROCEDURE — 32555 ASPIRATE PLEURA W/ IMAGING: CPT

## 2017-10-12 PROCEDURE — 74011250637 HC RX REV CODE- 250/637: Performed by: INTERNAL MEDICINE

## 2017-10-12 PROCEDURE — 74011250637 HC RX REV CODE- 250/637: Performed by: NURSE PRACTITIONER

## 2017-10-12 PROCEDURE — 74011250637 HC RX REV CODE- 250/637: Performed by: HOSPITALIST

## 2017-10-12 PROCEDURE — 82945 GLUCOSE OTHER FLUID: CPT

## 2017-10-12 PROCEDURE — 85610 PROTHROMBIN TIME: CPT | Performed by: INTERNAL MEDICINE

## 2017-10-12 PROCEDURE — 84157 ASSAY OF PROTEIN OTHER: CPT

## 2017-10-12 PROCEDURE — 71010 XR CHEST PORT: CPT

## 2017-10-12 PROCEDURE — 80048 BASIC METABOLIC PNL TOTAL CA: CPT | Performed by: INTERNAL MEDICINE

## 2017-10-12 PROCEDURE — 89050 BODY FLUID CELL COUNT: CPT

## 2017-10-12 PROCEDURE — 83615 LACTATE (LD) (LDH) ENZYME: CPT

## 2017-10-12 PROCEDURE — 65660000000 HC RM CCU STEPDOWN

## 2017-10-12 PROCEDURE — 84155 ASSAY OF PROTEIN SERUM: CPT

## 2017-10-12 PROCEDURE — 77010033678 HC OXYGEN DAILY

## 2017-10-12 PROCEDURE — 74011000250 HC RX REV CODE- 250: Performed by: RADIOLOGY

## 2017-10-12 PROCEDURE — 36415 COLL VENOUS BLD VENIPUNCTURE: CPT | Performed by: INTERNAL MEDICINE

## 2017-10-12 RX ORDER — WARFARIN SODIUM 5 MG/1
5 TABLET ORAL ONCE
Status: COMPLETED | OUTPATIENT
Start: 2017-10-12 | End: 2017-10-12

## 2017-10-12 RX ORDER — LIDOCAINE HYDROCHLORIDE 10 MG/ML
10 INJECTION, SOLUTION EPIDURAL; INFILTRATION; INTRACAUDAL; PERINEURAL
Status: COMPLETED | OUTPATIENT
Start: 2017-10-12 | End: 2017-10-12

## 2017-10-12 RX ORDER — POTASSIUM CHLORIDE 750 MG/1
20 TABLET, FILM COATED, EXTENDED RELEASE ORAL 3 TIMES DAILY
Status: DISCONTINUED | OUTPATIENT
Start: 2017-10-12 | End: 2017-10-16

## 2017-10-12 RX ADMIN — CARVEDILOL 25 MG: 12.5 TABLET, FILM COATED ORAL at 08:41

## 2017-10-12 RX ADMIN — POTASSIUM CHLORIDE 20 MEQ: 750 TABLET, FILM COATED, EXTENDED RELEASE ORAL at 17:42

## 2017-10-12 RX ADMIN — VENLAFAXINE 75 MG: 37.5 TABLET ORAL at 17:42

## 2017-10-12 RX ADMIN — NYSTATIN: 100000 POWDER TOPICAL at 23:21

## 2017-10-12 RX ADMIN — ISOSORBIDE MONONITRATE 30 MG: 30 TABLET ORAL at 12:47

## 2017-10-12 RX ADMIN — GUAIFENESIN 600 MG: 600 TABLET, EXTENDED RELEASE ORAL at 23:21

## 2017-10-12 RX ADMIN — BISACODYL 5 MG: 5 TABLET, COATED ORAL at 12:47

## 2017-10-12 RX ADMIN — ACETAMINOPHEN 650 MG: 325 TABLET, FILM COATED ORAL at 19:21

## 2017-10-12 RX ADMIN — BUMETANIDE 1 MG: 0.25 INJECTION INTRAMUSCULAR; INTRAVENOUS at 08:41

## 2017-10-12 RX ADMIN — NYSTATIN: 100000 POWDER TOPICAL at 12:49

## 2017-10-12 RX ADMIN — VENLAFAXINE 75 MG: 37.5 TABLET ORAL at 08:41

## 2017-10-12 RX ADMIN — BUMETANIDE 1 MG: 0.25 INJECTION INTRAMUSCULAR; INTRAVENOUS at 17:42

## 2017-10-12 RX ADMIN — POTASSIUM CHLORIDE 20 MEQ: 750 TABLET, FILM COATED, EXTENDED RELEASE ORAL at 23:21

## 2017-10-12 RX ADMIN — CARVEDILOL 25 MG: 12.5 TABLET, FILM COATED ORAL at 17:42

## 2017-10-12 RX ADMIN — LOSARTAN POTASSIUM 25 MG: 25 TABLET ORAL at 12:48

## 2017-10-12 RX ADMIN — ALLOPURINOL 100 MG: 100 TABLET ORAL at 12:48

## 2017-10-12 RX ADMIN — LEVOTHYROXINE SODIUM 112 MCG: 112 TABLET ORAL at 06:34

## 2017-10-12 RX ADMIN — GABAPENTIN 400 MG: 400 CAPSULE ORAL at 23:21

## 2017-10-12 RX ADMIN — Medication 1 CAPSULE: at 12:47

## 2017-10-12 RX ADMIN — METOLAZONE 5 MG: 5 TABLET ORAL at 12:47

## 2017-10-12 RX ADMIN — PANTOPRAZOLE SODIUM 40 MG: 40 TABLET, DELAYED RELEASE ORAL at 12:47

## 2017-10-12 RX ADMIN — POTASSIUM CHLORIDE 20 MEQ: 750 TABLET, FILM COATED, EXTENDED RELEASE ORAL at 08:41

## 2017-10-12 RX ADMIN — LORATADINE 10 MG: 10 TABLET ORAL at 12:47

## 2017-10-12 RX ADMIN — WARFARIN SODIUM 5 MG: 5 TABLET ORAL at 17:42

## 2017-10-12 RX ADMIN — GUAIFENESIN 600 MG: 600 TABLET, EXTENDED RELEASE ORAL at 08:41

## 2017-10-12 RX ADMIN — Medication 10 ML: at 17:43

## 2017-10-12 RX ADMIN — NYSTATIN: 100000 POWDER TOPICAL at 17:43

## 2017-10-12 RX ADMIN — LIDOCAINE HYDROCHLORIDE 10 ML: 10 INJECTION, SOLUTION EPIDURAL; INFILTRATION; INTRACAUDAL; PERINEURAL at 17:00

## 2017-10-12 RX ADMIN — Medication 10 ML: at 23:22

## 2017-10-12 RX ADMIN — Medication 10 ML: at 06:00

## 2017-10-12 RX ADMIN — SPIRONOLACTONE 25 MG: 25 TABLET, FILM COATED ORAL at 12:48

## 2017-10-12 NOTE — PROGRESS NOTES
Bedside shift change report given to Delfina Patel (oncoming nurse) by Monica Eng RN (offgoing nurse). Report included the following information SBAR.

## 2017-10-12 NOTE — PROGRESS NOTES
NUTRITION- DIETETIC tECHnICIAN    Pt seen for:       [x]                  Rescreen  []                  Food preferences/tolerances  []                  Food Allergies  []                  PO intake check  []                  Supplements  []                  Diet order clarification  []                  Education  []                  Other     Rescreen:    [x]                  Not at Nutrition Risk, rescreen per screening protocol  []                  At Nutrition Risk- RD referral         SUBJECTIVE/OBJECTIVE:     Information obtained from:  patient      Diet:  Regular Cardiac 2 gm Na    Intake: Very good    Patient Vitals for the past 100 hrs:   % Diet Eaten   10/11/17 1145 75 %   10/11/17 0900 100 %   10/10/17 1623 100 %   10/10/17 1113 50 %   10/10/17 0905 75 %   10/09/17 1831 50 %   10/09/17 0940 100 %   10/08/17 1746 100 %   10/08/17 1357 75 %       Weight Changes: Wt Readings from Last 3 Encounters:   10/12/17 (!) 168.4 kg (371 lb 4.1 oz)   10/02/17 (!) 179.6 kg (395 lb 15.1 oz)   12/02/16 158.8 kg (350 lb)       Problems Identified:      [x]                  Eating well. Previously educated on low sodium/healthy heart diet.     []                  Specified food preferences   []                  Dislikes supplements              []                  Allergies:   []                  Difficulty chewing      []                  Dentition    []                  Nausea/Vomiting   []                  Constipation   []                  Diarrhea    PLAN:     [x]                   Continue current diet and encourage intake  []                   Obtained/adjusted food preferences/tolerances and/or snacks options   []                   Dislikes supplements will try a substitution  []                   Modify diet for food allergies  []                   Adjust texture due to difficulty chewing   []                   Educated patient  []                   RD Referral  [x]                   Rescreen per screening protocol          Osorio Bellamy DTR

## 2017-10-12 NOTE — PROGRESS NOTES
Cardiology Progress Note      10/12/2017 9:26 AM    Admit Date: 10/6/2017    Admit Diagnosis: Acute on chronic diastolic heart failure (HCC)      Subjective:     Daljit Ordonez says her breathing is feeling better  Diuresing well  Weight coming down  Due to thoracentesis today      Current Facility-Administered Medications   Medication Dose Route Frequency    potassium chloride SR (KLOR-CON 10) tablet 20 mEq  20 mEq Oral TID    bumetanide (BUMEX) injection 1 mg  1 mg IntraVENous BID    bisacodyl (DULCOLAX) tablet 5 mg  5 mg Oral DAILY    spironolactone (ALDACTONE) tablet 25 mg  25 mg Oral DAILY    isosorbide mononitrate ER (IMDUR) tablet 30 mg  30 mg Oral DAILY    losartan (COZAAR) tablet 25 mg  25 mg Oral DAILY    influenza vaccine 2017-18 (3 yrs+)(PF) (FLUZONE QUAD/FLUARIX QUAD) injection 0.5 mL  0.5 mL IntraMUSCular PRIOR TO DISCHARGE    carvedilol (COREG) tablet 25 mg  25 mg Oral BID WITH MEALS    metOLazone (ZAROXOLYN) tablet 5 mg  5 mg Oral DAILY    nystatin (MYCOSTATIN) 100,000 unit/gram powder   Topical TID    allopurinol (ZYLOPRIM) tablet 100 mg  100 mg Oral DAILY    docusate (COLACE) 50 mg/5 mL oral liquid 50 mg  50 mg Oral BID PRN    gabapentin (NEURONTIN) capsule 400 mg  400 mg Oral QHS    guaiFENesin ER (MUCINEX) tablet 600 mg  600 mg Oral BID    lactobac ac& pc-s.therm-b.anim (PHI Q/RISAQUAD)  1 Cap Oral DAILY    levothyroxine (SYNTHROID) tablet 112 mcg  112 mcg Oral ACB    loratadine (CLARITIN) tablet 10 mg  10 mg Oral DAILY    pantoprazole (PROTONIX) tablet 40 mg  40 mg Oral DAILY    venlafaxine (EFFEXOR) tablet 75 mg  75 mg Oral BID    sodium chloride (NS) flush 5-10 mL  5-10 mL IntraVENous Q8H    sodium chloride (NS) flush 5-10 mL  5-10 mL IntraVENous PRN    ondansetron (ZOFRAN) injection 4 mg  4 mg IntraVENous Q4H PRN    albuterol-ipratropium (DUO-NEB) 2.5 MG-0.5 MG/3 ML  3 mL Nebulization Q4H PRN    warfarin - pharmacy to dose   Other Rx Dosing/Monitoring    acetaminophen (TYLENOL) tablet 650 mg  650 mg Oral Q4H PRN         Objective:      Physical Exam:  Visit Vitals    /61 (BP 1 Location: Right arm, BP Patient Position: At rest)    Pulse 99    Temp 97.9 °F (36.6 °C)    Resp 24    Ht 5' 9\" (1.753 m)    Wt (!) 168.4 kg (371 lb 4.1 oz)    SpO2 96%    Breastfeeding No    BMI 54.82 kg/m2     General Appearance:  Well developed, obese, alert and oriented x 3, and individual in no acute distress. Ears/Nose/Mouth/Throat:   Hearing grossly normal.         Neck: Supple. Chest:   Lungs clear to auscultation bilaterally. Cardiovascular:  Irregularly irregular, S1, S2 normal, no murmur. Abdomen:   Soft, non-tender, bowel sounds are active. Extremities: Severe edema bilaterally. Skin: Warm and dry.                  Data Review:   Labs:  Recent Results (from the past 24 hour(s))   PROTHROMBIN TIME + INR    Collection Time: 10/11/17  4:35 PM   Result Value Ref Range    INR 1.7 (H) 0.9 - 1.1      Prothrombin time 17.4 (H) 9.0 - 11.1 sec   PROTHROMBIN TIME + INR    Collection Time: 10/12/17  4:43 AM   Result Value Ref Range    INR 1.3 (H) 0.9 - 1.1      Prothrombin time 13.6 (H) 9.0 - 98.7 sec   METABOLIC PANEL, BASIC    Collection Time: 10/12/17  4:43 AM   Result Value Ref Range    Sodium 136 136 - 145 mmol/L    Potassium 3.4 (L) 3.5 - 5.1 mmol/L    Chloride 91 (L) 97 - 108 mmol/L    CO2 39 (H) 21 - 32 mmol/L    Anion gap 6 5 - 15 mmol/L    Glucose 87 65 - 100 mg/dL    BUN 18 6 - 20 MG/DL    Creatinine 1.59 (H) 0.55 - 1.02 MG/DL    BUN/Creatinine ratio 11 (L) 12 - 20      GFR est AA 38 (L) >60 ml/min/1.73m2    GFR est non-AA 32 (L) >60 ml/min/1.73m2    Calcium 9.1 8.5 - 10.1 MG/DL       Telemetry: AFIB - rate in the 100-110 range      Assessment:     1) Acute on chronic diastolic heart failure  Diuresing well    2) Chronic a.fib  Holding anticoagulation for thoracentesis  Reasonably well rate controlled    3) CKD stage III    4) Morbid obesity     5) Non-compliance with medications    Plan:     Continue diuresis  Thoracentesis today

## 2017-10-12 NOTE — PROGRESS NOTES
NAME: Lilibeth Gray        :  1940        MRN:  [id number]        Assessment :    Plan:  --CKD-3/4-Dr. Edgard Bowser creatinine around 1.7   A. Fib  Pleural effusion  Noncompliance with meds and office visits. Volume overload --Creatinine nearing baseline with needed diuresis. Increase KCL po. Subjective:     Chief Complaint:  \" My mouth is very dry. \"  No N/V.  + dyspnea. No CP. Review of Systems:    Symptom Y/N Comments  Symptom Y/N Comments   Fever/Chills    Chest Pain     Poor Appetite    Edema     Cough    Abdominal Pain     Sputum    Joint Pain     SOB/KENT    Pruritis/Rash     Nausea/vomit    Tolerating PT/OT     Diarrhea    Tolerating Diet     Constipation    Other       Could not obtain due to:      Objective:     VITALS:   Last 24hrs VS reviewed since prior progress note.  Most recent are:  Visit Vitals    /61 (BP 1 Location: Right arm, BP Patient Position: At rest)    Pulse 99    Temp 97.9 °F (36.6 °C)    Resp 24    Ht 5' 9\" (1.753 m)    Wt (!) 168.4 kg (371 lb 4.1 oz)    SpO2 96%    Breastfeeding No    BMI 54.82 kg/m2       Intake/Output Summary (Last 24 hours) at 10/12/17 8349  Last data filed at 10/11/17 2307   Gross per 24 hour   Intake              240 ml   Output             2500 ml   Net            -2260 ml      Telemetry Reviewed:     PHYSICAL EXAM:  General: NAD  Rhonchi bilaterally anteriorly  abd soft, obese  ++ edema      Lab Data Reviewed: (see below)    Medications Reviewed: (see below)    PMH/SH reviewed - no change compared to H&P  ________________________________________________________________________  Care Plan discussed with:  Patient     Family      RN     Care Manager                    Consultant:          Comments   >50% of visit spent in counseling and coordination of care       ________________________________________________________________________  Sarah Michele Irwin Varner MD     Procedures: see electronic medical records for all procedures/Xrays and details which  were not copied into this note but were reviewed prior to creation of Plan. LABS:  Recent Labs      10/10/17   0508   WBC  6.4   HGB  12.1   HCT  37.5   PLT  261     Recent Labs      10/12/17   0443  10/11/17   0334  10/10/17   0508   NA  136  137  134*   K  3.4*  3.6  4.8   CL  91*  92*  93*   CO2  39*  39*  38*   BUN  18  17  14   CREA  1.59*  1.50*  1.49*   GLU  87  97  99   CA  9.1  8.8  8.8   MG   --   1.8  1.9     No results for input(s): SGOT, GPT, AP, TBIL, TP, ALB, GLOB, GGT, AML, LPSE in the last 72 hours. No lab exists for component: AMYP, HLPSE  Recent Labs      10/12/17   0443  10/11/17   1635  10/11/17   0334   INR  1.3*  1.7*  2.2*   PTP  13.6*  17.4*  22.0*      No results for input(s): FE, TIBC, PSAT, FERR in the last 72 hours. No results found for: FOL, RBCF   No results for input(s): PH, PCO2, PO2 in the last 72 hours. No results for input(s): CPK, CKMB in the last 72 hours.     No lab exists for component: TROPONINI  No components found for: [place]  Lab Results   Component Value Date/Time    Color DARK YELLOW 12/02/2016 05:46 PM    Appearance TURBID 12/02/2016 05:46 PM    Specific gravity 1.022 12/02/2016 05:46 PM    Specific gravity 1.015 03/16/2012 01:55 AM    pH (UA) 5.0 12/02/2016 05:46 PM    Protein 30 12/02/2016 05:46 PM    Glucose NEGATIVE  12/02/2016 05:46 PM    Ketone TRACE 12/02/2016 05:46 PM    Bilirubin NEGATIVE  07/08/2012 12:10 AM    Urobilinogen 1.0 12/02/2016 05:46 PM    Nitrites NEGATIVE  12/02/2016 05:46 PM    Leukocyte Esterase NEGATIVE  12/02/2016 05:46 PM    Epithelial cells MANY 12/02/2016 05:46 PM    Bacteria 1+ 12/02/2016 05:46 PM    WBC 0-4 12/02/2016 05:46 PM    RBC 0-5 12/02/2016 05:46 PM       MEDICATIONS:  Current Facility-Administered Medications   Medication Dose Route Frequency    potassium chloride SR (KLOR-CON 10) tablet 20 mEq  20 mEq Oral TID  bumetanide (BUMEX) injection 1 mg  1 mg IntraVENous BID    bisacodyl (DULCOLAX) tablet 5 mg  5 mg Oral DAILY    spironolactone (ALDACTONE) tablet 25 mg  25 mg Oral DAILY    isosorbide mononitrate ER (IMDUR) tablet 30 mg  30 mg Oral DAILY    losartan (COZAAR) tablet 25 mg  25 mg Oral DAILY    influenza vaccine 2017-18 (3 yrs+)(PF) (FLUZONE QUAD/FLUARIX QUAD) injection 0.5 mL  0.5 mL IntraMUSCular PRIOR TO DISCHARGE    carvedilol (COREG) tablet 25 mg  25 mg Oral BID WITH MEALS    metOLazone (ZAROXOLYN) tablet 5 mg  5 mg Oral DAILY    nystatin (MYCOSTATIN) 100,000 unit/gram powder   Topical TID    allopurinol (ZYLOPRIM) tablet 100 mg  100 mg Oral DAILY    docusate (COLACE) 50 mg/5 mL oral liquid 50 mg  50 mg Oral BID PRN    gabapentin (NEURONTIN) capsule 400 mg  400 mg Oral QHS    guaiFENesin ER (MUCINEX) tablet 600 mg  600 mg Oral BID    lactobac ac& pc-s.therm-b.anim (PHI Q/RISAQUAD)  1 Cap Oral DAILY    levothyroxine (SYNTHROID) tablet 112 mcg  112 mcg Oral ACB    loratadine (CLARITIN) tablet 10 mg  10 mg Oral DAILY    pantoprazole (PROTONIX) tablet 40 mg  40 mg Oral DAILY    venlafaxine (EFFEXOR) tablet 75 mg  75 mg Oral BID    sodium chloride (NS) flush 5-10 mL  5-10 mL IntraVENous Q8H    sodium chloride (NS) flush 5-10 mL  5-10 mL IntraVENous PRN    ondansetron (ZOFRAN) injection 4 mg  4 mg IntraVENous Q4H PRN    albuterol-ipratropium (DUO-NEB) 2.5 MG-0.5 MG/3 ML  3 mL Nebulization Q4H PRN    warfarin - pharmacy to dose   Other Rx Dosing/Monitoring    acetaminophen (TYLENOL) tablet 650 mg  650 mg Oral Q4H PRN

## 2017-10-12 NOTE — ROUTINE PROCESS
TRANSFER - OUT REPORT:    Verbal report given to Araseli Gonzalez RN(name) on Alejandra uQijano  being transferred to [facility]) for routine progression of care       Report consisted of patients Situation, Background, Assessment and   Recommendations(SBAR). Information from the following report(s) SBAR was reviewed with the receiving CM.

## 2017-10-12 NOTE — PROGRESS NOTES
TRANSFER - IN REPORT:    Verbal report received from [facility]) on Lilibeth Gray  being received from [facility](unit) for routine progression of care      Report consisted of patients Situation, Background, Assessment and   Recommendations(SBAR). Information from the following report(s) SBAR was reviewed with the receiving nurse. Opportunity for questions and clarification was provided. Assessment completed upon patients arrival to unit and care assumed. Primary Nurse Drucella Sandifer, RN and Gabriela Allen RN performed a dual skin assessment on this patient Impairment noted- see wound doc flow sheet    Two wounds on lower left leg.  excoriation under pannus  Multiple abrasions on bilateral thighs, under the body and buttocks.   Scattered bruising on right arm

## 2017-10-12 NOTE — PROGRESS NOTES
Pt is off the unit for a procedure. PT will continue to follow and see as appropriate.  Thank you, Lazaro Lemos PT.

## 2017-10-12 NOTE — PROGRESS NOTES
Received notes that [facility] and [facility] SNFs have accepted patient. Spoke to patient to get preference, and she wants to talk it over with her .

## 2017-10-12 NOTE — PROGRESS NOTES
Problem: Heart Failure: Day 3  Goal: Medications  Outcome: Progressing Towards Goal  Medications were given prior to transfer. Am synthroid given  Goal: Respiratory  Outcome: Progressing Towards Goal  O2 sats remain above 90% on 1L NC.       Goal: Treatments/Interventions/Procedures  Outcome: Progressing Towards Goal  Patient Vitals for the past 24 hrs:    Temp Pulse Resp BP SpO2   10/12/17 0320 97.6 °F (36.4 °C) (!) 113 20 107/62 95 %   10/11/17 2308 97.7 °F (36.5 °C) 94 20 111/82 95 %   10/11/17 1919 98 °F (36.7 °C) (!) 103 18 105/47 97 %   10/11/17 1552 97.7 °F (36.5 °C) (!) 101 18 113/77 96 %   10/11/17 1202 98 °F (36.7 °C) (!) 106 18 101/48 94 %   10/11/17 0759 97.6 °F (36.4 °C) (!) 103 18 117/59 93 %         Goal: *Hemodynamically stable  Outcome: Progressing Towards Goal  Patient Vitals for the past 12 hrs:    Temp Pulse Resp BP SpO2   10/12/17 0320 97.6 °F (36.4 °C) (!) 113 20 107/62 95 %   10/11/17 2308 97.7 °F (36.5 °C) 94 20 111/82 95 %   10/11/17 1919 98 °F (36.7 °C) (!) 103 18 105/47 97 %         Goal: *Optimal pain control at patients stated goal  Outcome: Progressing Towards Goal  Patient complained of no pain during shift

## 2017-10-12 NOTE — PROGRESS NOTES
Respiratory Educator    COPD education provided to this 75yo female with diastolic heart failure and history of COPD. See physician's notes. Purpose is to educate pt on COPD, COPD management and exacerbation prevention. Goal to prevent admission for COPD exacerbation. S: \"I'm hard of hearing\". \"Takes effort to push out the air to talk\"    O/A: Pt awake in bed, on O2 at 1lpm via NC with O2 Sats of 96%, HR: 98, RR: 22, BS: Diminished and clear, No Rhonchi, No Rales, No wheezes. Cough effort is weak and non-productive. 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 Pt speaking in short sentences and appears dyspneic when speaking. No acrocyanosis noted. Pt. States she has a rescue inhaler (Albuterol inhaler) at home to use PRN. Pt states she has not used it. Other issues: GIANNA. Pt states she was told she had \"severe GIANNA\" and would need a \"ventilator\". Pt stated she did not want a \"tube in my throat\" so she did not pursue this matter. P: COPD packet provided and contents reviewed  1. Reviewed COPD: what it is and affects on the lung  2. Discussed use of rescue inhaler (Albuterol, LABA) ,(Action plan-Green-Yellow-Red). 3. Discussed importance of COPD management and exacerbation prevention  4. Discussed importance of adhering to physician's orders  5. Discussed importance of f/up with Dr after discharge.: Review and implement home resp. Regimen. May benefit from LABA. 6.  Discussed briefly NIV (Non-invasive ventilation) for GIANNA, and suggested she discuss with her Dr. the benefits of following recommendations to treat her GIANNA. 7. Will follow pt. telephonically after discharge.     Osmel Landa, RRT  Respiratory Care  Pulmonary Disease Case Manager

## 2017-10-12 NOTE — PROGRESS NOTES
Hospitalist Progress Note  Florence Cortez NP  Office: [phone]  Cell [phone]      Date of Service:  10/12/2017  NAME:  Sheridan Escalante  :  1940  MRN:  [id number]      Admission Summary:   71-year-old woman with a past medical history significant for bilateral lower extremity edema, atrialfibrillation, obstructive sleep apnea, COPD, hypothyroidism, hypertension, depression, chronic kidney disease stage 3, morbid obesity, admitted for acute on chronic diastolic HF. Interval history / Subjective:   Sob feels slightly better with diuresis  UOP 2550  Left thoracentesis today   VSS     Assessment & Plan:     Acute on chronic diastolic CHF  - bumex 1mg IV bid, metolazone  - c/w coreg, losartan, imdur  - card's following    Left mod pleural effusion  - 10/9 Cxr: with improvement  - 10/11CXR 10/11: no improvement  - 10/11 Vit K 2.5mg X 1 now, check INR in 6hr  - 10/12 US guided thoracentesis, follow labs     A.fib  - on coumadin and coreg  - 10/12 restart coumdain    Sleep apnea  - pt intolerant of BiPAP or CPAP     Morbid obesity  - encouraged lifestyle modification     CKD-3  - baseline Cr 1.7  - 10/12 1.59    Hypokalemia and hypo magnesemia: repleted    Wound care for LE lymphedema with small ulcer.     COPD: nebs and home meds. HTN coreg, losartan    DVT PPX: on coumadin  Care Plan discussed with: patient  Disposition: rehab after diuresis     Hospital Problems  Date Reviewed: 10/7/2017          Codes Class Noted POA    * (Principal)[facility] diastolic [facility] ICD-10-CM: I50.33  ICD-9-CM: 428.33  10/6/2017 Yes                Review of Systems:   No chest pain, head ache, dizziness. Vital Signs:    Last 24hrs VS reviewed since prior progress note.  Most recent are:  Visit Vitals    BP 97/47 (BP 1 Location: Right arm, BP Patient Position: At rest)    Pulse (!) 106    Temp 98.5 °F (36.9 °C)    Resp 17    Ht 5' 9\" (1.753 m)    Wt (!) 168.4 kg (371 lb 4.1 oz)    SpO2 96%    Breastfeeding No    BMI 54.82 kg/m2         Intake/Output Summary (Last 24 hours) at 10/12/17 1630  Last data filed at 10/12/17 1302   Gross per 24 hour   Intake                0 ml   Output             2500 ml   Net            -2500 ml        Physical Examination:       Gen: Not in acute distress  HEENT: atraumatic, normocephalic, moist mucous membranes  CVS: Irregularly irregular, normal rate, afib on tele  RS: decreased BS on left , good air entry on Rt  GI : obese, NT, ND bowel sounds present   Extremities: LE edematous, lymph edema bilaterally  Neuro: Alert and oriented x3    Data Review:    Labs reviewed      Labs:     Recent Labs      10/10/17   0508   WBC  6.4   HGB  12.1   HCT  37.5   PLT  261     Recent Labs      10/12/17   0443  10/11/17   0334  10/10/17   0508   NA  136  137  134*   K  3.4*  3.6  4.8   CL  91*  92*  93*   CO2  39*  39*  38*   BUN  18  17  14   CREA  1.59*  1.50*  1.49*   GLU  87  97  99   CA  9.1  8.8  8.8   MG   --   1.8  1.9     Recent Labs      10/12/17   0443   TP  7.0     Recent Labs      10/12/17   0443  10/11/17   1635  10/11/17   0334   INR  1.3*  1.7*  2.2*   PTP  13.6*  17.4*  22.0*      No results for input(s): FE, TIBC, PSAT, FERR in the last 72 hours. No results found for: FOL, RBCF   No results for input(s): PH, PCO2, PO2 in the last 72 hours. No results for input(s): CPK, CKNDX, TROIQ in the last 72 hours.     No lab exists for component: CPKMB  No results found for: CHOL, CHOLX, CHLST, CHOLV, HDL, LDL, LDLC, DLDLP, TGLX, TRIGL, TRIGP, CHHD, CHHDX  Lab Results   Component Value Date/Time    Glucose (POC) 98 10/07/2017 11:07 AM    Glucose (POC) 92 10/07/2017 06:50 AM    Glucose (POC) 169 12/05/2016 04:43 PM    Glucose (POC) 126 12/05/2016 12:15 PM    Glucose (POC) 112 12/05/2016 08:23 AM     Lab Results   Component Value Date/Time    Color DARK YELLOW 12/02/2016 05:46 PM    Appearance TURBID 12/02/2016 05:46 PM Specific gravity 1.022 12/02/2016 05:46 PM    Specific gravity 1.015 03/16/2012 01:55 AM    pH (UA) 5.0 12/02/2016 05:46 PM    Protein 30 12/02/2016 05:46 PM    Glucose NEGATIVE  12/02/2016 05:46 PM    Ketone TRACE 12/02/2016 05:46 PM    Bilirubin NEGATIVE  07/08/2012 12:10 AM    Urobilinogen 1.0 12/02/2016 05:46 PM    Nitrites NEGATIVE  12/02/2016 05:46 PM    Leukocyte Esterase NEGATIVE  12/02/2016 05:46 PM    Epithelial cells MANY 12/02/2016 05:46 PM    Bacteria 1+ 12/02/2016 05:46 PM    WBC 0-4 12/02/2016 05:46 PM    RBC 0-5 12/02/2016 05:46 PM         Medications Reviewed:     Current Facility-Administered Medications   Medication Dose Route Frequency    potassium chloride SR (KLOR-CON 10) tablet 20 mEq  20 mEq Oral TID    warfarin (COUMADIN) tablet 5 mg  5 mg Oral ONCE    [COMPLETED] lidocaine (PF) (XYLOCAINE) 10 mg/mL (1 %) injection 10 mL  10 mL SubCUTAneous RAD ONCE    bumetanide (BUMEX) injection 1 mg  1 mg IntraVENous BID    bisacodyl (DULCOLAX) tablet 5 mg  5 mg Oral DAILY    spironolactone (ALDACTONE) tablet 25 mg  25 mg Oral DAILY    isosorbide mononitrate ER (IMDUR) tablet 30 mg  30 mg Oral DAILY    losartan (COZAAR) tablet 25 mg  25 mg Oral DAILY    influenza vaccine 2017-18 (3 yrs+)(PF) (FLUZONE QUAD/FLUARIX QUAD) injection 0.5 mL  0.5 mL IntraMUSCular PRIOR TO DISCHARGE    carvedilol (COREG) tablet 25 mg  25 mg Oral BID WITH MEALS    metOLazone (ZAROXOLYN) tablet 5 mg  5 mg Oral DAILY    nystatin (MYCOSTATIN) 100,000 unit/gram powder   Topical TID    allopurinol (ZYLOPRIM) tablet 100 mg  100 mg Oral DAILY    docusate (COLACE) 50 mg/5 mL oral liquid 50 mg  50 mg Oral BID PRN    gabapentin (NEURONTIN) capsule 400 mg  400 mg Oral QHS    guaiFENesin ER (MUCINEX) tablet 600 mg  600 mg Oral BID    lactobac ac& pc-s.therm-b.anim (PHI Q/RISAQUAD)  1 Cap Oral DAILY    levothyroxine (SYNTHROID) tablet 112 mcg  112 mcg Oral ACB    loratadine (CLARITIN) tablet 10 mg  10 mg Oral DAILY    pantoprazole (PROTONIX) tablet 40 mg  40 mg Oral DAILY    venlafaxine (EFFEXOR) tablet 75 mg  75 mg Oral BID    sodium chloride (NS) flush 5-10 mL  5-10 mL IntraVENous Q8H    sodium chloride (NS) flush 5-10 mL  5-10 mL IntraVENous PRN    ondansetron (ZOFRAN) injection 4 mg  4 mg IntraVENous Q4H PRN    albuterol-ipratropium (DUO-NEB) 2.5 MG-0.5 MG/3 ML  3 mL Nebulization Q4H PRN    warfarin - pharmacy to dose   Other Rx Dosing/Monitoring    acetaminophen (TYLENOL) tablet 650 mg  650 mg Oral Q4H PRN     ______________________________________________________________________  EXPECTED LENGTH OF STAY: 4d 12h  ACTUAL LENGTH OF STAY:          6                 Leeanne Oleary V NP

## 2017-10-12 NOTE — PROGRESS NOTES
Pharmacist Note  Warfarin Dosing  Consult provided for this 69 y. o.female to manage warfarin for h/o Atrial Fibrillation. INR Goal: 2 - 3    Home regimen/ tablet size: 2.5 mg on M/W/TH/SA; 5 mg on T/F/Sun    Drugs that may increase INR:  Allopurinol  Drugs that may decrease INR: None  Other current anticoagulants/ drugs that may increase bleeding risk: none  Risk factors: Age > 65, Acute on chronic diastolic CHF  Daily INR ordered: YES    Recent Labs      10/12/17   0443  10/11/17   1635  10/11/17   0334   10/10/17   0508   HGB   --    --    --    --   12.1   INR  1.3*  1.7*  2.2*   < >   --     < > = values in this interval not displayed. Date               INR                  Dose  10/06            2.0      --   10/07  --  7.5 mg    10/08              2.3                    5 mg  10/09              2.2                    5 mg  10/10              2.2                    5 mg  10/11              2.2                    held and 2.5 mg IV vitamin K given for thoracentesis on 10/12  10/12              1.3                    5 mg- okay to resume per NP                                                                               Assessment/ Plan: Will order warfarin 5 mg PO x 1 today. Pharmacy will continue to monitor daily and adjust therapy as indicated.          Sidney Sheppard, WilfredD, BCPS

## 2017-10-12 NOTE — PROGRESS NOTES
Met with patient and  to discuss options for SNF placement - they have chosen to pursue [facility]. and [facility] -  wants to ensure facility can accommodate patient's bariatric needs - referrals to be made and will await acceptance of facilities at this time.  BRANDON Hernandes

## 2017-10-13 LAB
ANION GAP SERPL CALC-SCNC: 6 MMOL/L (ref 5–15)
BUN SERPL-MCNC: 22 MG/DL (ref 6–20)
BUN/CREAT SERPL: 13 (ref 12–20)
CALCIUM SERPL-MCNC: 8.8 MG/DL (ref 8.5–10.1)
CHLORIDE SERPL-SCNC: 91 MMOL/L (ref 97–108)
CO2 SERPL-SCNC: 40 MMOL/L (ref 21–32)
CREAT SERPL-MCNC: 1.7 MG/DL (ref 0.55–1.02)
GLUCOSE SERPL-MCNC: 113 MG/DL (ref 65–100)
INR PPP: 1.2 (ref 0.9–1.1)
MAGNESIUM SERPL-MCNC: 1.7 MG/DL (ref 1.6–2.4)
POTASSIUM SERPL-SCNC: 3.7 MMOL/L (ref 3.5–5.1)
PROTHROMBIN TIME: 12 SEC (ref 9–11.1)
SODIUM SERPL-SCNC: 137 MMOL/L (ref 136–145)

## 2017-10-13 PROCEDURE — 74011250637 HC RX REV CODE- 250/637: Performed by: INTERNAL MEDICINE

## 2017-10-13 PROCEDURE — 80048 BASIC METABOLIC PNL TOTAL CA: CPT | Performed by: INTERNAL MEDICINE

## 2017-10-13 PROCEDURE — 74011250637 HC RX REV CODE- 250/637: Performed by: HOSPITALIST

## 2017-10-13 PROCEDURE — 77010033678 HC OXYGEN DAILY

## 2017-10-13 PROCEDURE — 36415 COLL VENOUS BLD VENIPUNCTURE: CPT | Performed by: INTERNAL MEDICINE

## 2017-10-13 PROCEDURE — 74011250637 HC RX REV CODE- 250/637: Performed by: NURSE PRACTITIONER

## 2017-10-13 PROCEDURE — 74011000250 HC RX REV CODE- 250: Performed by: HOSPITALIST

## 2017-10-13 PROCEDURE — 85610 PROTHROMBIN TIME: CPT | Performed by: INTERNAL MEDICINE

## 2017-10-13 PROCEDURE — 83735 ASSAY OF MAGNESIUM: CPT | Performed by: INTERNAL MEDICINE

## 2017-10-13 PROCEDURE — 97110 THERAPEUTIC EXERCISES: CPT

## 2017-10-13 PROCEDURE — 65660000000 HC RM CCU STEPDOWN

## 2017-10-13 RX ORDER — WARFARIN SODIUM 5 MG/1
5 TABLET ORAL ONCE
Status: COMPLETED | OUTPATIENT
Start: 2017-10-13 | End: 2017-10-13

## 2017-10-13 RX ADMIN — Medication 10 ML: at 17:57

## 2017-10-13 RX ADMIN — Medication 1 CAPSULE: at 09:11

## 2017-10-13 RX ADMIN — NYSTATIN: 100000 POWDER TOPICAL at 22:45

## 2017-10-13 RX ADMIN — VENLAFAXINE 75 MG: 37.5 TABLET ORAL at 17:56

## 2017-10-13 RX ADMIN — NYSTATIN: 100000 POWDER TOPICAL at 16:30

## 2017-10-13 RX ADMIN — GUAIFENESIN 600 MG: 600 TABLET, EXTENDED RELEASE ORAL at 22:45

## 2017-10-13 RX ADMIN — LORATADINE 10 MG: 10 TABLET ORAL at 09:11

## 2017-10-13 RX ADMIN — GABAPENTIN 400 MG: 400 CAPSULE ORAL at 22:45

## 2017-10-13 RX ADMIN — LEVOTHYROXINE SODIUM 112 MCG: 112 TABLET ORAL at 08:07

## 2017-10-13 RX ADMIN — POTASSIUM CHLORIDE 20 MEQ: 750 TABLET, FILM COATED, EXTENDED RELEASE ORAL at 22:45

## 2017-10-13 RX ADMIN — BISACODYL 5 MG: 5 TABLET, COATED ORAL at 09:11

## 2017-10-13 RX ADMIN — BUMETANIDE 1 MG: 0.25 INJECTION INTRAMUSCULAR; INTRAVENOUS at 09:12

## 2017-10-13 RX ADMIN — ALLOPURINOL 100 MG: 100 TABLET ORAL at 09:11

## 2017-10-13 RX ADMIN — GUAIFENESIN 600 MG: 600 TABLET, EXTENDED RELEASE ORAL at 09:11

## 2017-10-13 RX ADMIN — ISOSORBIDE MONONITRATE 30 MG: 30 TABLET ORAL at 09:10

## 2017-10-13 RX ADMIN — POTASSIUM CHLORIDE 20 MEQ: 750 TABLET, FILM COATED, EXTENDED RELEASE ORAL at 09:10

## 2017-10-13 RX ADMIN — Medication 10 ML: at 09:16

## 2017-10-13 RX ADMIN — BUMETANIDE 1 MG: 0.25 INJECTION INTRAMUSCULAR; INTRAVENOUS at 17:56

## 2017-10-13 RX ADMIN — PANTOPRAZOLE SODIUM 40 MG: 40 TABLET, DELAYED RELEASE ORAL at 09:11

## 2017-10-13 RX ADMIN — SPIRONOLACTONE 25 MG: 25 TABLET, FILM COATED ORAL at 09:06

## 2017-10-13 RX ADMIN — VENLAFAXINE 75 MG: 37.5 TABLET ORAL at 09:11

## 2017-10-13 RX ADMIN — LOSARTAN POTASSIUM 25 MG: 25 TABLET ORAL at 09:10

## 2017-10-13 RX ADMIN — Medication 5 ML: at 22:00

## 2017-10-13 RX ADMIN — WARFARIN SODIUM 5 MG: 5 TABLET ORAL at 16:43

## 2017-10-13 RX ADMIN — POTASSIUM CHLORIDE 20 MEQ: 750 TABLET, FILM COATED, EXTENDED RELEASE ORAL at 16:31

## 2017-10-13 RX ADMIN — ACETAMINOPHEN 650 MG: 325 TABLET, FILM COATED ORAL at 02:19

## 2017-10-13 RX ADMIN — ACETAMINOPHEN 650 MG: 325 TABLET, FILM COATED ORAL at 13:32

## 2017-10-13 RX ADMIN — CARVEDILOL 25 MG: 12.5 TABLET, FILM COATED ORAL at 16:43

## 2017-10-13 RX ADMIN — CARVEDILOL 25 MG: 12.5 TABLET, FILM COATED ORAL at 08:07

## 2017-10-13 RX ADMIN — Medication 10 ML: at 13:33

## 2017-10-13 RX ADMIN — NYSTATIN: 100000 POWDER TOPICAL at 09:14

## 2017-10-13 RX ADMIN — Medication 10 ML: at 06:00

## 2017-10-13 NOTE — PROGRESS NOTES
Problem: Mobility Impaired (Adult and Pediatric)  Goal: *Acute Goals and Plan of Care (Insert Text)  Physical Therapy Goals  Initiated 10/7/2017  1. Patient will move from supine to sit and sit to supine , scoot up and down and roll side to side in bed with moderate assistance within 7 day(s). 2. Patient will transfer from bed to chair and chair to bed with maximal assistance using the least restrictive device within 7 day(s). 3. Patient will perform sit to stand with maximal assistance within 7 day(s). PHYSICAL THERAPY TREATMENT  Patient: Maryana Chinchilla (70 y.o. female)  Date: 10/13/2017  Diagnosis: Acute on chronic diastolic heart failure (HCC) Acute on chronic diastolic heart failure [facility])       Precautions: Fall      ASSESSMENT:  Pt received up to the stretcher chair. Teamed with pt's nurse with plan for nursing to team with mobility team for holger up to a stretcher chair with pt to sit on waffle cushion. Per her nurse they had to use transfer board but got pt up to the tan colored stretcher chair. I utilized the controls on the stretcher chair and lowered the chair as far as it would allow with intent to work on sit to stand to bariatric walker. Deemed unsafe to attempt standing, chair height to high ( for the stand to sit task) . Worked on [employer], ankle pumps, active assisted hip flexion and resisted elbow extension 10 reps each. Pt remained up to the stretcher chair. For the weekend recommend use of stretcher chair for OOB for all meals and PT to return after the weekend. Progression toward goals:  [ ]    Improving appropriately and progressing toward goals  [ ]    Improving slowly and progressing toward goals  [X]    Not making progress toward goals and plan of care will be adjusted       PLAN:  Patient continues to benefit from skilled intervention to address the above impairments. Continue treatment per established plan of care.   Discharge Recommendations:  [address] Recommendations for Discharge:  none       SUBJECTIVE:   Patient stated I don't know if this left leg will hold me.       OBJECTIVE DATA SUMMARY:   Chart checked, pt cleared by nursing. Critical Behavior:  Neurologic State: Alert  Orientation Level: Oriented X4  Cognition: Follows commands  Safety/Judgement: Awareness of environment, Good awareness of safety precautions  Functional Mobility Training:  Bed Mobility:         not assessed            Transfers:               dependent, performed by nursing                    Balance:  Sitting: Intact; With support  Ambulation/Gait Training:                                                                 n/a  Stairs:                       Neuro Re-Education:     Therapeutic Exercises:   Refer to assessment above  Pain:  Pain Scale 1: Numeric (0 - 10)  Pain Intensity 1: 0        Pain Description 1:  (Pt. states bladder discomfort.)     Activity Tolerance:   Pt in NAD     After treatment:   [X]    Patient left in no apparent distress sitting up in stretcher chair  [ ]    Patient left in no apparent distress in bed  [X]    Call bell left within reach  [X]    Nursing notified  [ ]    Caregiver present  [ ]    Bed alarm activated      COMMUNICATION/COLLABORATION:   The patients plan of care was discussed with: Registered Nurse     Kristen Osborne   Time Calculation: 22 mins

## 2017-10-13 NOTE — PROGRESS NOTES
Hospitalist Progress Note  Vamsi Draper NP  Office: [phone]  Cell [phone]      Date of Service:  10/13/2017  NAME:  Nova Guo  :  1940  MRN:  [id number]      Admission Summary:   73-year-old woman with a past medical history significant for bilateral lower extremity edema, atrialfibrillation, obstructive sleep apnea, COPD, hypothyroidism, hypertension, depression, chronic kidney disease stage 3, morbid obesity, admitted for acute on chronic diastolic HF. Interval history / Subjective:   S/p left thoracentesis, 700 cc pulled off  UOP 2500  VSS, lethargic today, sob feels better  Hopefully sob improves by tomorrow and can go to SNF     Assessment & Plan:     Acute on chronic diastolic CHF  - bumex 1mg IV bid,   - c/w coreg, losartan, imdur  - card's following  - 10/13 d/c  Metolazone    Left mod pleural effusion - improved s/p thoracentesis  - 10/9 Cxr: with improvement  - 10/11CXR 10/11: no improvement  - 10/11 Vit K 2.5mg X 1 now, check INR in 6hr  - 10/12 US guided thoracentesis, follow labs     A.fib  - on coumadin and coreg  - 10/12 restart coumdain    Sleep apnea  - pt intolerant of BiPAP or CPAP     Morbid obesity  - encouraged lifestyle modification     CKD-3  - baseline Cr 1.7  - 10/12 1.59  - 10/13 1.7    Hypokalemia and hypo magnesemia: repleted    Wound care for LE lymphedema with small ulcer.     COPD: nebs and home meds. HTN coreg, losartan    DVT PPX: on coumadin  Care Plan discussed with: patient  Disposition: rehab after diuresis     Hospital Problems  Date Reviewed: 10/7/2017          Codes Class Noted POA    * (Principal)Acute on chronic diastolic heart failure ([facility] ICD-10-CM: I50.33  ICD-9-CM: 428.33  10/6/2017 Yes                Review of Systems:   No chest pain, headache, dizziness. Sob better      Vital Signs:    Last 24hrs VS reviewed since prior progress note.  Most recent are:  Visit Vitals    BP 91/48 (BP 1 Location: Right arm, BP Patient Position: At rest)    Pulse 89    Temp 97.4 °F (36.3 °C)    Resp 18    Ht 5' 9\" (1.753 m)    Wt (!) 168.4 kg (371 lb 4.1 oz)    SpO2 92%    Breastfeeding No    BMI 54.82 kg/m2         Intake/Output Summary (Last 24 hours) at 10/13/17 1533  Last data filed at 10/13/17 0906   Gross per 24 hour   Intake              100 ml   Output             1500 ml   Net            -1400 ml        Physical Examination:       Gen: Not in acute distress  HEENT: atraumatic, normocephalic, dry mucous membranes  CVS: Irregularly irregular, normal rate, afib on tele  RS: Diminished bilaterally posteriorly  GI : obese, NT, bowel sounds present   Extremities: LE edematous, lymph edema bilaterally  Neuro: Alert and oriented x3    Data Review:    Labs reviewed      Labs:     No results for input(s): WBC, HGB, HCT, PLT, HGBEXT, HCTEXT, PLTEXT, HGBEXT, HCTEXT, PLTEXT in the last 72 hours. Recent Labs      10/13/17   0212  10/12/17   0443  10/11/17   0334   NA  137  136  137   K  3.7  3.4*  3.6   CL  91*  91*  92*   CO2  40*  39*  39*   BUN  22*  18  17   CREA  1.70*  1.59*  1.50*   GLU  113*  87  97   CA  8.8  9.1  8.8   MG  1.7   --   1.8     Recent Labs      10/12/17   0443   TP  7.0     Recent Labs      10/13/17   0212  10/12/17   0443  10/11/17   1635   INR  1.2*  1.3*  1.7*   PTP  12.0*  13.6*  17.4*      No results for input(s): FE, TIBC, PSAT, FERR in the last 72 hours. No results found for: FOL, RBCF   No results for input(s): PH, PCO2, PO2 in the last 72 hours. No results for input(s): CPK, CKNDX, TROIQ in the last 72 hours.     No lab exists for component: CPKMB  No results found for: CHOL, CHOLX, CHLST, CHOLV, HDL, LDL, LDLC, DLDLP, TGLX, TRIGL, TRIGP, CHHD, [facility]  Lab Results   Component Value Date/Time    Glucose (POC) 98 10/07/2017 11:07 AM    Glucose (POC) 92 10/07/2017 06:50 AM    Glucose (POC) 169 12/05/2016 04:43 PM    Glucose (POC) 126 12/05/2016 12:15 PM    Glucose (POC) 112 12/05/2016 08:23 AM     Lab Results   Component Value Date/Time    Color DARK YELLOW 12/02/2016 05:46 PM    Appearance TURBID 12/02/2016 05:46 PM    Specific gravity 1.022 12/02/2016 05:46 PM    Specific gravity 1.015 03/16/2012 01:55 AM    pH (UA) 5.0 12/02/2016 05:46 PM    Protein 30 12/02/2016 05:46 PM    Glucose NEGATIVE  12/02/2016 05:46 PM    Ketone TRACE 12/02/2016 05:46 PM    Bilirubin NEGATIVE  07/08/2012 12:10 AM    Urobilinogen 1.0 12/02/2016 05:46 PM    Nitrites NEGATIVE  12/02/2016 05:46 PM    Leukocyte Esterase NEGATIVE  12/02/2016 05:46 PM    Epithelial cells MANY 12/02/2016 05:46 PM    Bacteria 1+ 12/02/2016 05:46 PM    WBC 0-4 12/02/2016 05:46 PM    RBC 0-5 12/02/2016 05:46 PM         Medications Reviewed:     Current Facility-Administered Medications   Medication Dose Route Frequency    warfarin (COUMADIN) tablet 5 mg  5 mg Oral ONCE    potassium chloride SR (KLOR-CON 10) tablet 20 mEq  20 mEq Oral TID    bumetanide (BUMEX) injection 1 mg  1 mg IntraVENous BID    bisacodyl (DULCOLAX) tablet 5 mg  5 mg Oral DAILY    spironolactone (ALDACTONE) tablet 25 mg  25 mg Oral DAILY    isosorbide mononitrate ER (IMDUR) tablet 30 mg  30 mg Oral DAILY    losartan (COZAAR) tablet 25 mg  25 mg Oral DAILY    influenza vaccine 2017-18 (3 yrs+)(PF) (FLUZONE QUAD/FLUARIX QUAD) injection 0.5 mL  0.5 mL IntraMUSCular PRIOR TO DISCHARGE    carvedilol (COREG) tablet 25 mg  25 mg Oral BID WITH MEALS    nystatin (MYCOSTATIN) 100,000 unit/gram powder   Topical TID    allopurinol (ZYLOPRIM) tablet 100 mg  100 mg Oral DAILY    docusate (COLACE) 50 mg/5 mL oral liquid 50 mg  50 mg Oral BID PRN    gabapentin (NEURONTIN) capsule 400 mg  400 mg Oral QHS    guaiFENesin ER (MUCINEX) tablet 600 mg  600 mg Oral BID    lactobac ac& pc-s.therm-b.anim (PHI Q/RISAQUAD)  1 Cap Oral DAILY    levothyroxine (SYNTHROID) tablet 112 mcg  112 mcg Oral ACB    loratadine (CLARITIN) tablet 10 mg  10 mg Oral DAILY    pantoprazole (PROTONIX) tablet 40 mg  40 mg Oral DAILY    venlafaxine (EFFEXOR) tablet 75 mg  75 mg Oral BID    sodium chloride (NS) flush 5-10 mL  5-10 mL IntraVENous Q8H    sodium chloride (NS) flush 5-10 mL  5-10 mL IntraVENous PRN    ondansetron (ZOFRAN) injection 4 mg  4 mg IntraVENous Q4H PRN    albuterol-ipratropium (DUO-NEB) 2.5 MG-0.5 MG/3 ML  3 mL Nebulization Q4H PRN    warfarin - pharmacy to dose   Other Rx Dosing/Monitoring    acetaminophen (TYLENOL) tablet 650 mg  650 mg Oral Q4H PRN     ______________________________________________________________________  EXPECTED LENGTH OF STAY: 4d 12h  ACTUAL LENGTH OF STAY:          7                 Leeanne Oleary V NP

## 2017-10-13 NOTE — PROGRESS NOTES
NAME: Juan Soriano        :  1940        MRN:  [id number]        Assessment :    Plan:  --CKD-3/4-Dr. Nargis Pena creatinine around 1.7   A. Fib  Pleural effusion  Noncompliance with meds and office visits. Volume overload --Creatinine at baseline with needed diuresis. Continue KCL po. Agree with decreased diuretics. Subjective:     Chief Complaint:  \" I'm kind of fuzzy today. \"  No N/V.  less dyspnea. No CP. Review of Systems:    Symptom Y/N Comments  Symptom Y/N Comments   Fever/Chills    Chest Pain     Poor Appetite    Edema     Cough    Abdominal Pain     Sputum    Joint Pain     SOB/KENT    Pruritis/Rash     Nausea/vomit    Tolerating PT/OT     Diarrhea    Tolerating Diet     Constipation    Other       Could not obtain due to:      Objective:     VITALS:   Last 24hrs VS reviewed since prior progress note.  Most recent are:  Visit Vitals    BP 91/48 (BP 1 Location: Right arm, BP Patient Position: At rest)    Pulse 89    Temp 97.4 °F (36.3 °C)    Resp 18    Ht 5' 9\" (1.753 m)    Wt (!) 168.4 kg (371 lb 4.1 oz)    SpO2 92%    Breastfeeding No    BMI 54.82 kg/m2       Intake/Output Summary (Last 24 hours) at 10/13/17 1230  Last data filed at 10/13/17 0906   Gross per 24 hour   Intake              100 ml   Output             2500 ml   Net            -2400 ml      Telemetry Reviewed:     PHYSICAL EXAM:  General: NAD  Rhonchi bilaterally anteriorly  abd soft, obese  ++ edema      Lab Data Reviewed: (see below)    Medications Reviewed: (see below)    PMH/SH reviewed - no change compared to H&P  ________________________________________________________________________  Care Plan discussed with:  Patient     Family      RN     Care Manager                    Consultant:          Comments   >50% of visit spent in counseling and coordination of care ________________________________________________________________________  Sera Mckeon MD     Procedures: see electronic medical records for all procedures/Xrays and details which  were not copied into this note but were reviewed prior to creation of Plan. LABS:  No results for input(s): WBC, HGB, HCT, PLT, HGBEXT, HCTEXT, PLTEXT, HGBEXT, HCTEXT, PLTEXT in the last 72 hours. Recent Labs      10/13/17   0212  10/12/17   0443  10/11/17   0334   NA  137  136  137   K  3.7  3.4*  3.6   CL  91*  91*  92*   CO2  40*  39*  39*   BUN  22*  18  17   CREA  1.70*  1.59*  1.50*   GLU  113*  87  97   CA  8.8  9.1  8.8   MG  1.7   --   1.8     Recent Labs      10/12/17   0443   TP  7.0     Recent Labs      10/13/17   0212  10/12/17   0443  10/11/17   1635   INR  1.2*  1.3*  1.7*   PTP  12.0*  13.6*  17.4*      No results for input(s): FE, TIBC, PSAT, FERR in the last 72 hours. No results found for: FOL, RBCF   No results for input(s): PH, PCO2, PO2 in the last 72 hours. No results for input(s): CPK, CKMB in the last 72 hours.     No lab exists for component: TROPONINI  No components found for: [place]  Lab Results   Component Value Date/Time    Color DARK YELLOW 12/02/2016 05:46 PM    Appearance TURBID 12/02/2016 05:46 PM    Specific gravity 1.022 12/02/2016 05:46 PM    Specific gravity 1.015 03/16/2012 01:55 AM    pH (UA) 5.0 12/02/2016 05:46 PM    Protein 30 12/02/2016 05:46 PM    Glucose NEGATIVE  12/02/2016 05:46 PM    Ketone TRACE 12/02/2016 05:46 PM    Bilirubin NEGATIVE  07/08/2012 12:10 AM    Urobilinogen 1.0 12/02/2016 05:46 PM    Nitrites NEGATIVE  12/02/2016 05:46 PM    Leukocyte Esterase NEGATIVE  12/02/2016 05:46 PM    Epithelial cells MANY 12/02/2016 05:46 PM    Bacteria 1+ 12/02/2016 05:46 PM    WBC 0-4 12/02/2016 05:46 PM    RBC 0-5 12/02/2016 05:46 PM       MEDICATIONS:  Current Facility-Administered Medications   Medication Dose Route Frequency    warfarin (COUMADIN) tablet 5 mg  5 mg Oral ONCE    potassium chloride SR (KLOR-CON 10) tablet 20 mEq  20 mEq Oral TID    bumetanide (BUMEX) injection 1 mg  1 mg IntraVENous BID    bisacodyl (DULCOLAX) tablet 5 mg  5 mg Oral DAILY    spironolactone (ALDACTONE) tablet 25 mg  25 mg Oral DAILY    isosorbide mononitrate ER (IMDUR) tablet 30 mg  30 mg Oral DAILY    losartan (COZAAR) tablet 25 mg  25 mg Oral DAILY    influenza vaccine 2017-18 (3 yrs+)(PF) (FLUZONE QUAD/FLUARIX QUAD) injection 0.5 mL  0.5 mL IntraMUSCular PRIOR TO DISCHARGE    carvedilol (COREG) tablet 25 mg  25 mg Oral BID WITH MEALS    nystatin (MYCOSTATIN) 100,000 unit/gram powder   Topical TID    allopurinol (ZYLOPRIM) tablet 100 mg  100 mg Oral DAILY    docusate (COLACE) 50 mg/5 mL oral liquid 50 mg  50 mg Oral BID PRN    gabapentin (NEURONTIN) capsule 400 mg  400 mg Oral QHS    guaiFENesin ER (MUCINEX) tablet 600 mg  600 mg Oral BID    lactobac ac& pc-s.therm-b.anim (PHI Q/RISAQUAD)  1 Cap Oral DAILY    levothyroxine (SYNTHROID) tablet 112 mcg  112 mcg Oral ACB    loratadine (CLARITIN) tablet 10 mg  10 mg Oral DAILY    pantoprazole (PROTONIX) tablet 40 mg  40 mg Oral DAILY    venlafaxine (EFFEXOR) tablet 75 mg  75 mg Oral BID    sodium chloride (NS) flush 5-10 mL  5-10 mL IntraVENous Q8H    sodium chloride (NS) flush 5-10 mL  5-10 mL IntraVENous PRN    ondansetron (ZOFRAN) injection 4 mg  4 mg IntraVENous Q4H PRN    albuterol-ipratropium (DUO-NEB) 2.5 MG-0.5 MG/3 ML  3 mL Nebulization Q4H PRN    warfarin - pharmacy to dose   Other Rx Dosing/Monitoring    acetaminophen (TYLENOL) tablet 650 mg  650 mg Oral Q4H PRN

## 2017-10-13 NOTE — PROGRESS NOTES
Problem: Heart Failure: Day 5  Goal: Off Pathway (Use only if patient is Off Pathway)  Outcome: Progressing Towards Goal  Patient on day 8

## 2017-10-13 NOTE — PROGRESS NOTES
Problem: Pressure Injury - Risk of  Goal: *Prevention of pressure ulcer  Outcome: Progressing Towards Goal  Patient is being turned and repositioned every 2 hours and as needed. Problem: Falls - Risk of  Goal: *Absence of Falls  Document Jacob Fall Risk and appropriate interventions in the flowsheet.    Outcome: Progressing Towards Goal  Fall Risk Interventions:              Medication Interventions: Evaluate medications/consider consulting pharmacy, Bed/chair exit alarm     Elimination Interventions: Call light in reach, Patient to call for help with toileting needs     History of Falls Interventions: Evaluate medications/consider consulting pharmacy

## 2017-10-13 NOTE — PROGRESS NOTES
Cardiology Progress Note      10/13/2017 9:26 AM    Admit Date: 10/6/2017    Admit Diagnosis: Acute on chronic diastolic heart failure (HCC)      Subjective:     Dlajit Ordonez says she feels about the same  No specific complaints    Current Facility-Administered Medications   Medication Dose Route Frequency    potassium chloride SR (KLOR-CON 10) tablet 20 mEq  20 mEq Oral TID    bumetanide (BUMEX) injection 1 mg  1 mg IntraVENous BID    bisacodyl (DULCOLAX) tablet 5 mg  5 mg Oral DAILY    spironolactone (ALDACTONE) tablet 25 mg  25 mg Oral DAILY    isosorbide mononitrate ER (IMDUR) tablet 30 mg  30 mg Oral DAILY    losartan (COZAAR) tablet 25 mg  25 mg Oral DAILY    influenza vaccine 2017-18 (3 yrs+)(PF) (FLUZONE QUAD/FLUARIX QUAD) injection 0.5 mL  0.5 mL IntraMUSCular PRIOR TO DISCHARGE    carvedilol (COREG) tablet 25 mg  25 mg Oral BID WITH MEALS    nystatin (MYCOSTATIN) 100,000 unit/gram powder   Topical TID    allopurinol (ZYLOPRIM) tablet 100 mg  100 mg Oral DAILY    docusate (COLACE) 50 mg/5 mL oral liquid 50 mg  50 mg Oral BID PRN    gabapentin (NEURONTIN) capsule 400 mg  400 mg Oral QHS    guaiFENesin ER (MUCINEX) tablet 600 mg  600 mg Oral BID    lactobac ac& pc-s.therm-b.anim (PHI Q/RISAQUAD)  1 Cap Oral DAILY    levothyroxine (SYNTHROID) tablet 112 mcg  112 mcg Oral ACB    loratadine (CLARITIN) tablet 10 mg  10 mg Oral DAILY    pantoprazole (PROTONIX) tablet 40 mg  40 mg Oral DAILY    venlafaxine (EFFEXOR) tablet 75 mg  75 mg Oral BID    sodium chloride (NS) flush 5-10 mL  5-10 mL IntraVENous Q8H    sodium chloride (NS) flush 5-10 mL  5-10 mL IntraVENous PRN    ondansetron (ZOFRAN) injection 4 mg  4 mg IntraVENous Q4H PRN    albuterol-ipratropium (DUO-NEB) 2.5 MG-0.5 MG/3 ML  3 mL Nebulization Q4H PRN    warfarin - pharmacy to dose   Other Rx Dosing/Monitoring    acetaminophen (TYLENOL) tablet 650 mg  650 mg Oral Q4H PRN         Objective:      Physical Exam:  Visit Vitals    BP 108/52 (BP 1 Location: Right arm, BP Patient Position: At rest)    Pulse 92    Temp 97.8 °F (36.6 °C)    Resp 18    Ht 5' 9\" (1.753 m)    Wt (!) 168.4 kg (371 lb 4.1 oz)    SpO2 100%    Breastfeeding No    BMI 54.82 kg/m2     General Appearance:  Well developed, obese, alert and oriented x 3, and individual in no acute distress. Ears/Nose/Mouth/Throat:   Hearing grossly normal.         Neck: Supple. Chest:   Lungs clear to auscultation bilaterally. Cardiovascular:  Irregularly irregular, S1, S2 normal, no murmur. Abdomen:   Soft, non-tender, bowel sounds are active. Extremities: Severe edema bilaterally. Skin: Warm and dry. Data Review:   Labs:    Recent Results (from the past 24 hour(s))   CULTURE, BODY FLUID W GRAM STAIN    Collection Time: 10/12/17  4:04 PM   Result Value Ref Range    Special Requests: NO SPECIAL REQUESTS      GRAM STAIN RARE WBCS SEEN      GRAM STAIN NO ORGANISMS SEEN      Culture result: PENDING    CELL COUNT, BODY FLUID    Collection Time: 10/12/17  4:05 PM   Result Value Ref Range    BODY FLUID TYPE PLEURAL FLUID      FLUID COLOR RED      FLUID APPEARANCE BLOODY      FLUID RBC CT. >100 /cu mm    FLUID WBC COUNT 4121 (H) 0 - 5 /cu mm    FLD NEUTROPHILS 16 %    FLD LYMPHS 18 %    FLD MONO/MACROPHAGES 25 %    FLD EOSINS 35 %    FLUID MESOTHELIAL 6 %   LDH, BODY FLUID    Collection Time: 10/12/17  4:05 PM   Result Value Ref Range    Fluid Type: PLEURAL FLUID      LD, body fld. 476 U/L   PROTEIN TOTAL, FLUID    Collection Time: 10/12/17  4:05 PM   Result Value Ref Range    Fluid Type: PLEURAL FLUID      Protein total, body fld. 3.9 g/dL   GLUCOSE, FLUID    Collection Time: 10/12/17  4:05 PM   Result Value Ref Range    Fluid Type: PLEURAL FLUID      Glucose, body fld.  108 MG/DL   PROTHROMBIN TIME + INR    Collection Time: 10/13/17  2:12 AM   Result Value Ref Range    INR 1.2 (H) 0.9 - 1.1      Prothrombin time 12.0 (H) 9.0 - 01.1 sec   METABOLIC PANEL, BASIC Collection Time: 10/13/17  2:12 AM   Result Value Ref Range    Sodium 137 136 - 145 mmol/L    Potassium 3.7 3.5 - 5.1 mmol/L    Chloride 91 (L) 97 - 108 mmol/L    CO2 40 (H) 21 - 32 mmol/L    Anion gap 6 5 - 15 mmol/L    Glucose 113 (H) 65 - 100 mg/dL    BUN 22 (H) 6 - 20 MG/DL    Creatinine 1.70 (H) 0.55 - 1.02 MG/DL    BUN/Creatinine ratio 13 12 - 20      GFR est AA 35 (L) >60 ml/min/1.73m2    GFR est non-AA 29 (L) >60 ml/min/1.73m2    Calcium 8.8 8.5 - 10.1 MG/DL   MAGNESIUM    Collection Time: 10/13/17  2:12 AM   Result Value Ref Range    Magnesium 1.7 1.6 - 2.4 mg/dL       Telemetry: AFIB - rate in the 100-110 range      Assessment:     1) Acute on chronic diastolic heart failure  Diuresing well    2) Chronic a.fib  Holding anticoagulation for thoracentesis  Reasonably well rate controlled    3) CKD stage III  Cr rising    4) Morbid obesity     5) Non-compliance with medications    Plan:     Continue bumex but hold metolazone today in light of rising Cr

## 2017-10-14 LAB
ANION GAP SERPL CALC-SCNC: 7 MMOL/L (ref 5–15)
BUN SERPL-MCNC: 24 MG/DL (ref 6–20)
BUN/CREAT SERPL: 13 (ref 12–20)
CALCIUM SERPL-MCNC: 9 MG/DL (ref 8.5–10.1)
CHLORIDE SERPL-SCNC: 92 MMOL/L (ref 97–108)
CO2 SERPL-SCNC: 37 MMOL/L (ref 21–32)
CREAT SERPL-MCNC: 1.81 MG/DL (ref 0.55–1.02)
GLUCOSE SERPL-MCNC: 98 MG/DL (ref 65–100)
INR PPP: 1.2 (ref 0.9–1.1)
POTASSIUM SERPL-SCNC: 3.9 MMOL/L (ref 3.5–5.1)
PROTHROMBIN TIME: 12.6 SEC (ref 9–11.1)
SODIUM SERPL-SCNC: 136 MMOL/L (ref 136–145)

## 2017-10-14 PROCEDURE — 74011250637 HC RX REV CODE- 250/637: Performed by: INTERNAL MEDICINE

## 2017-10-14 PROCEDURE — 85610 PROTHROMBIN TIME: CPT | Performed by: INTERNAL MEDICINE

## 2017-10-14 PROCEDURE — 74011250637 HC RX REV CODE- 250/637: Performed by: HOSPITALIST

## 2017-10-14 PROCEDURE — 80048 BASIC METABOLIC PNL TOTAL CA: CPT | Performed by: INTERNAL MEDICINE

## 2017-10-14 PROCEDURE — 74011000250 HC RX REV CODE- 250: Performed by: HOSPITALIST

## 2017-10-14 PROCEDURE — 74011250637 HC RX REV CODE- 250/637: Performed by: NURSE PRACTITIONER

## 2017-10-14 PROCEDURE — 65660000000 HC RM CCU STEPDOWN

## 2017-10-14 PROCEDURE — 36415 COLL VENOUS BLD VENIPUNCTURE: CPT | Performed by: INTERNAL MEDICINE

## 2017-10-14 RX ORDER — CARVEDILOL 12.5 MG/1
12.5 TABLET ORAL 2 TIMES DAILY WITH MEALS
Status: DISCONTINUED | OUTPATIENT
Start: 2017-10-15 | End: 2017-10-15

## 2017-10-14 RX ORDER — WARFARIN SODIUM 5 MG/1
5 TABLET ORAL ONCE
Status: COMPLETED | OUTPATIENT
Start: 2017-10-14 | End: 2017-10-14

## 2017-10-14 RX ADMIN — NYSTATIN: 100000 POWDER TOPICAL at 18:21

## 2017-10-14 RX ADMIN — NYSTATIN: 100000 POWDER TOPICAL at 21:03

## 2017-10-14 RX ADMIN — ALLOPURINOL 100 MG: 100 TABLET ORAL at 09:41

## 2017-10-14 RX ADMIN — Medication 1 CAPSULE: at 09:41

## 2017-10-14 RX ADMIN — WARFARIN SODIUM 5 MG: 5 TABLET ORAL at 18:20

## 2017-10-14 RX ADMIN — Medication 10 ML: at 07:54

## 2017-10-14 RX ADMIN — LOSARTAN POTASSIUM 25 MG: 25 TABLET ORAL at 09:41

## 2017-10-14 RX ADMIN — LEVOTHYROXINE SODIUM 112 MCG: 112 TABLET ORAL at 07:54

## 2017-10-14 RX ADMIN — GABAPENTIN 400 MG: 400 CAPSULE ORAL at 21:03

## 2017-10-14 RX ADMIN — GUAIFENESIN 600 MG: 600 TABLET, EXTENDED RELEASE ORAL at 21:03

## 2017-10-14 RX ADMIN — POTASSIUM CHLORIDE 20 MEQ: 750 TABLET, FILM COATED, EXTENDED RELEASE ORAL at 18:20

## 2017-10-14 RX ADMIN — LORATADINE 10 MG: 10 TABLET ORAL at 09:41

## 2017-10-14 RX ADMIN — VENLAFAXINE 75 MG: 37.5 TABLET ORAL at 18:20

## 2017-10-14 RX ADMIN — POTASSIUM CHLORIDE 20 MEQ: 750 TABLET, FILM COATED, EXTENDED RELEASE ORAL at 09:41

## 2017-10-14 RX ADMIN — BUMETANIDE 1 MG: 0.25 INJECTION INTRAMUSCULAR; INTRAVENOUS at 09:41

## 2017-10-14 RX ADMIN — POTASSIUM CHLORIDE 20 MEQ: 750 TABLET, FILM COATED, EXTENDED RELEASE ORAL at 21:03

## 2017-10-14 RX ADMIN — Medication 10 ML: at 14:13

## 2017-10-14 RX ADMIN — CARVEDILOL 25 MG: 12.5 TABLET, FILM COATED ORAL at 09:40

## 2017-10-14 RX ADMIN — GUAIFENESIN 600 MG: 600 TABLET, EXTENDED RELEASE ORAL at 09:41

## 2017-10-14 RX ADMIN — BISACODYL 5 MG: 5 TABLET, COATED ORAL at 09:41

## 2017-10-14 RX ADMIN — NYSTATIN: 100000 POWDER TOPICAL at 09:48

## 2017-10-14 RX ADMIN — PANTOPRAZOLE SODIUM 40 MG: 40 TABLET, DELAYED RELEASE ORAL at 09:40

## 2017-10-14 RX ADMIN — ACETAMINOPHEN 325 MG: 325 TABLET, FILM COATED ORAL at 09:46

## 2017-10-14 RX ADMIN — LACTULOSE 30 G: 20 SOLUTION ORAL at 18:28

## 2017-10-14 RX ADMIN — Medication 10 ML: at 21:04

## 2017-10-14 RX ADMIN — ISOSORBIDE MONONITRATE 30 MG: 30 TABLET ORAL at 09:41

## 2017-10-14 RX ADMIN — VENLAFAXINE 75 MG: 37.5 TABLET ORAL at 09:40

## 2017-10-14 RX ADMIN — SPIRONOLACTONE 25 MG: 25 TABLET, FILM COATED ORAL at 09:41

## 2017-10-14 NOTE — PROGRESS NOTES
Bedside shift change report given to Ganesh Michael RN (oncoming nurse) by Dashawn Thomas RN (offgoing nurse). Report included the following information SBAR, Kardex, ED Summary, MAR, Accordion, Recent Results and Cardiac Rhythm A-Fib.

## 2017-10-14 NOTE — PROGRESS NOTES
1215--Spoke with Dr. Clarisse Ahmadi regarding patient blood pressures being low into the 70's and 80's (see vital signs) no new orders at this time. Will continue to monitor. 1415--Spoke with Dr. Clarisse Ahmadi regarding patient low blood pressure 82/37 and he ordered 250ml bolus to be given. 1500--Called and spoke with Dr. Clarisse Ahmadi about her Blood pressures a little better into the 90's and he will adjust her medications.

## 2017-10-14 NOTE — PROGRESS NOTES
Problem: Pressure Injury - Risk of  Goal: *Prevention of pressure ulcer  Outcome: Progressing Towards Goal    10/14/17 0120   Wound Prevention and Protection Methods   Orientation of Wound Prevention Posterior   Location of Wound Prevention Sacrum/Coccyx   Dressing Present  No   Read Only, Retired: Wound Treatment (non-mechanical)   Wound Offloading (Prevention Methods) Bed, pressure reduction mattress;Pillows;Repositioning;Turning         Comments:   Patient is on turn team and is being turned on a regular basis. As of now, patient is free of pressure ulcers and will continue to be monitored.

## 2017-10-14 NOTE — PROGRESS NOTES
Hospitalist Progress Note  Fredi Watson MD  Office: [phone]        Date of Service:  10/14/2017  NAME:  Mei Norton  :  1940  MRN:  [id number]      Admission Summary:   67-year-old woman with a past medical history significant for bilateral lower extremity edema, atrialfibrillation, obstructive sleep apnea, COPD, hypothyroidism, hypertension, depression, chronic kidney disease stage 3, morbid obesity, admitted for acute on chronic diastolic HF. Interval history / Subjective:   S/p left thoracentesis, 700 cc pulled off  Patient feeling tired today. Nurse concerned that BP has been low in the s. Denies sob,chest pain. Assessment & Plan:     Acute on chronic diastolic CHF  - bumex 1mg IV bid,   - c/w coreg, losartan, imdur  - sbp dropped in 70's early today,now up to 90;will monitor and adjust meds if further drop. - card's following  - 10/13 d/c  Metolazone    Left mod pleural effusion - improved s/p thoracentesis  - 10/9 Cxr: with improvement  - 10/11CXR 10/11: no improvement  - 10/11 Vit K 2.5mg X 1 now, check INR in 6hr  - 10/12 US guided thoracentesis, follow labs     A.fib  - on coumadin and coreg  - 10/12 restart coumdain    Sleep apnea  - pt intolerant of BiPAP or CPAP     Morbid obesity  - encouraged lifestyle modification     CKD-3  - baseline Cr 1.7  - 10/12 1.59  - 10/13 1.8  -nephrology following    Hypokalemia and hypo magnesemia: corrected    Wound care for LE lymphedema with small ulcer.     COPD: nebs and home meds. HTN coreg, losartan    DVT PPX: on coumadin  Care Plan discussed with: patient  Disposition: rehab after diuresis     Hospital Problems  Date Reviewed: 10/7/2017          Codes Class Noted POA    * (Principal)Acute on chronic diastolic heart failure ([facility] [address] ICD-10-CM: I50.33  ICD-9-CM: 428.33  10/6/2017 Yes                Review of Systems:   No chest pain, headache, dizziness.   Feeling tired,no short of breath. Vital Signs:    Last 24hrs VS reviewed since prior progress note. Most recent are:  Visit Vitals    BP 90/45 (BP 1 Location: Right arm, BP Patient Position: At rest;Head of bed elevated (Comment degrees))    Pulse 92    Temp 97.4 °F (36.3 °C)    Resp 16    Ht 5' 9\" (1.753 m)    Wt (!) 163.8 kg (361 lb 1.8 oz)    SpO2 95%    Breastfeeding No    BMI 53.33 kg/m2         Intake/Output Summary (Last 24 hours) at 10/14/17 1226  Last data filed at 10/13/17 2253   Gross per 24 hour   Intake                0 ml   Output             1400 ml   Net            -1400 ml        Physical Examination:       Gen: Not in acute distress  HEENT: atraumatic, normocephalic, dry mucous membranes  CVS: Irregularly irregular, normal rate, afib on tele  RS: Diminished bilaterally posteriorly  GI : obese, NT, bowel sounds present   Extremities: LE edematous, lymph edema bilaterally  Neuro: Alert and oriented x3    Data Review:    Labs reviewed      Labs:     No results for input(s): WBC, HGB, HCT, PLT, HGBEXT, HCTEXT, PLTEXT, HGBEXT, HCTEXT, PLTEXT in the last 72 hours. Recent Labs      10/14/17   0358  10/13/17   0212  10/12/17   0443   NA  136  137  136   K  3.9  3.7  3.4*   CL  92*  91*  91*   CO2  37*  40*  39*   BUN  24*  22*  18   CREA  1.81*  1.70*  1.59*   GLU  98  113*  87   CA  9.0  8.8  9.1   MG   --   1.7   --      Recent Labs      10/12/17   0443   TP  7.0     Recent Labs      10/14/17   0358  10/13/17   0212  10/12/17   0443   INR  1.2*  1.2*  1.3*   PTP  12.6*  12.0*  13.6*      No results for input(s): FE, TIBC, PSAT, FERR in the last 72 hours. No results found for: FOL, RBCF   No results for input(s): PH, PCO2, PO2 in the last 72 hours. No results for input(s): CPK, CKNDX, TROIQ in the last 72 hours.     No lab exists for component: CPKMB  No results found for: CHOL, CHOLX, CHLST, CHOLV, HDL, LDL, LDLC, DLDLP, TGLX, TRIGL, TRIGP, CHHD, CHHDX  Lab Results   Component Value Date/Time    Glucose (POC) 98 10/07/2017 11:07 AM    Glucose (POC) 92 10/07/2017 06:50 AM    Glucose (POC) 169 12/05/2016 04:43 PM    Glucose (POC) 126 12/05/2016 12:15 PM    Glucose (POC) 112 12/05/2016 08:23 AM     Lab Results   Component Value Date/Time    Color DARK YELLOW 12/02/2016 05:46 PM    Appearance TURBID 12/02/2016 05:46 PM    Specific gravity 1.022 12/02/2016 05:46 PM    Specific gravity 1.015 03/16/2012 01:55 AM    pH (UA) 5.0 12/02/2016 05:46 PM    Protein 30 12/02/2016 05:46 PM    Glucose NEGATIVE  12/02/2016 05:46 PM    Ketone TRACE 12/02/2016 05:46 PM    Bilirubin NEGATIVE  07/08/2012 12:10 AM    Urobilinogen 1.0 12/02/2016 05:46 PM    Nitrites NEGATIVE  12/02/2016 05:46 PM    Leukocyte Esterase NEGATIVE  12/02/2016 05:46 PM    Epithelial cells MANY 12/02/2016 05:46 PM    Bacteria 1+ 12/02/2016 05:46 PM    WBC 0-4 12/02/2016 05:46 PM    RBC 0-5 12/02/2016 05:46 PM         Medications Reviewed:     Current Facility-Administered Medications   Medication Dose Route Frequency    warfarin (COUMADIN) tablet 5 mg  5 mg Oral ONCE    potassium chloride SR (KLOR-CON 10) tablet 20 mEq  20 mEq Oral TID    bumetanide (BUMEX) injection 1 mg  1 mg IntraVENous BID    bisacodyl (DULCOLAX) tablet 5 mg  5 mg Oral DAILY    spironolactone (ALDACTONE) tablet 25 mg  25 mg Oral DAILY    isosorbide mononitrate ER (IMDUR) tablet 30 mg  30 mg Oral DAILY    losartan (COZAAR) tablet 25 mg  25 mg Oral DAILY    influenza vaccine 2017-18 (3 yrs+)(PF) (FLUZONE QUAD/FLUARIX QUAD) injection 0.5 mL  0.5 mL IntraMUSCular PRIOR TO DISCHARGE    carvedilol (COREG) tablet 25 mg  25 mg Oral BID WITH MEALS    nystatin (MYCOSTATIN) 100,000 unit/gram powder   Topical TID    allopurinol (ZYLOPRIM) tablet 100 mg  100 mg Oral DAILY    docusate (COLACE) 50 mg/5 mL oral liquid 50 mg  50 mg Oral BID PRN    gabapentin (NEURONTIN) capsule 400 mg  400 mg Oral QHS    guaiFENesin ER (MUCINEX) tablet 600 mg  600 mg Oral BID    ketan ac& pc-s.ahsan-b.anim (PHI Q/RISAQUAD)  1 Cap Oral DAILY    levothyroxine (SYNTHROID) tablet 112 mcg  112 mcg Oral ACB    loratadine (CLARITIN) tablet 10 mg  10 mg Oral DAILY    pantoprazole (PROTONIX) tablet 40 mg  40 mg Oral DAILY    venlafaxine (EFFEXOR) tablet 75 mg  75 mg Oral BID    sodium chloride (NS) flush 5-10 mL  5-10 mL IntraVENous Q8H    sodium chloride (NS) flush 5-10 mL  5-10 mL IntraVENous PRN    ondansetron (ZOFRAN) injection 4 mg  4 mg IntraVENous Q4H PRN    albuterol-ipratropium (DUO-NEB) 2.5 MG-0.5 MG/3 ML  3 mL Nebulization Q4H PRN    warfarin - pharmacy to dose   Other Rx Dosing/Monitoring    acetaminophen (TYLENOL) tablet 650 mg  650 mg Oral Q4H PRN     ______________________________________________________________________  EXPECTED LENGTH OF STAY: 4d 12h  ACTUAL LENGTH OF STAY:          8                 Kassandra Peguero MD

## 2017-10-14 NOTE — PROGRESS NOTES
Pharmacist Note  Warfarin Dosing  Consult provided for this 69 y. o.female to manage warfarin for h/o Atrial Fibrillation. INR Goal: 2 - 3    Home regimen/ tablet size: 2.5 mg on M/W/TH/SA; 5 mg on T/F/Sun    Drugs that may increase INR:  Allopurinol  Drugs that may decrease INR: None  Other current anticoagulants/ drugs that may increase bleeding risk: none  Risk factors: Age > 65, Acute on chronic diastolic CHF  Daily INR ordered: YES    Recent Labs      10/14/17   0358  10/13/17   0212  10/12/17   0443   INR  1.2*  1.2*  1.3*     Date               INR                  Dose  10/06            2.0      --   10/07  --  7.5 mg    10/08              2.3                    5 mg  10/09              2.2                    5 mg  10/10              2.2                    5 mg  10/11              2.2                    held and 2.5 mg IV vitamin K given for thoracentesis on 10/12  10/12              1.3                    5 mg- okay to resume per NP  10/13              1.2                    5 mg  10/14              1.2                    5 mg                                                                                Assessment/ Plan: Will order warfarin 5 mg PO x 1 today. Pharmacy will continue to monitor daily and adjust therapy as indicated.

## 2017-10-14 NOTE — PROGRESS NOTES
NAME: Rachell Dietz        :  1940        MRN:  [id number]        Assessment :    Plan:  --CKD-3/4-Dr. Teena Rowell creatinine around 1.7   A. Fib  Pleural effusion  Noncompliance with meds and office visits. Volume overload --Creatinine around baseline with needed diuresis. May need to tolerate higher Cr to achieve better volume status    Continue KCL po. Agree with current diuretics. Subjective:     Chief Complaint:  \" Breathing is much better. \"  No N/V. No CP. Good appetite    Review of Systems:    Symptom Y/N Comments  Symptom Y/N Comments   Fever/Chills    Chest Pain     Poor Appetite    Edema     Cough    Abdominal Pain     Sputum    Joint Pain     SOB/KENT    Pruritis/Rash     Nausea/vomit    Tolerating PT/OT     Diarrhea    Tolerating Diet     Constipation    Other       Could not obtain due to:      Objective:     VITALS:   Last 24hrs VS reviewed since prior progress note.  Most recent are:  Visit Vitals    /55 (BP 1 Location: Right arm, BP Patient Position: At rest)    Pulse (!) 104    Temp 97.7 °F (36.5 °C)    Resp 26    Ht 5' 9\" (1.753 m)    Wt (!) 163.8 kg (361 lb 1.8 oz)    SpO2 95%    Breastfeeding No    BMI 53.33 kg/m2       Intake/Output Summary (Last 24 hours) at 10/14/17 0911  Last data filed at 10/13/17 2253   Gross per 24 hour   Intake                0 ml   Output             1400 ml   Net            -1400 ml      Telemetry Reviewed:     PHYSICAL EXAM:  General: NAD  Rhonchi bilaterally anteriorly  abd soft, obese  ++ edema      Lab Data Reviewed: (see below)    Medications Reviewed: (see below)    PMH/SH reviewed - no change compared to H&P  ________________________________________________________________________  Care Plan discussed with:  Patient Y    Family      RN     Care Manager                    Consultant:          Comments   >50% of visit spent in counseling and coordination of care       ________________________________________________________________________  Abelardo Rose MD     Procedures: see electronic medical records for all procedures/Xrays and details which  were not copied into this note but were reviewed prior to creation of Plan. LABS:  No results for input(s): WBC, HGB, HCT, PLT, HGBEXT, HCTEXT, PLTEXT, HGBEXT, HCTEXT, PLTEXT in the last 72 hours. Recent Labs      10/14/17   0358  10/13/17   0212  10/12/17   0443   NA  136  137  136   K  3.9  3.7  3.4*   CL  92*  91*  91*   CO2  37*  40*  39*   BUN  24*  22*  18   CREA  1.81*  1.70*  1.59*   GLU  98  113*  87   CA  9.0  8.8  9.1   MG   --   1.7   --      Recent Labs      10/12/17   0443   TP  7.0     Recent Labs      10/14/17   0358  10/13/17   0212  10/12/17   0443   INR  1.2*  1.2*  1.3*   PTP  12.6*  12.0*  13.6*      No results for input(s): FE, TIBC, PSAT, FERR in the last 72 hours. No results found for: FOL, RBCF   No results for input(s): PH, PCO2, PO2 in the last 72 hours. No results for input(s): CPK, CKMB in the last 72 hours.     No lab exists for component: TROPONINI  No components found for: [place]  Lab Results   Component Value Date/Time    Color DARK YELLOW 12/02/2016 05:46 PM    Appearance TURBID 12/02/2016 05:46 PM    Specific gravity 1.022 12/02/2016 05:46 PM    Specific gravity 1.015 03/16/2012 01:55 AM    pH (UA) 5.0 12/02/2016 05:46 PM    Protein 30 12/02/2016 05:46 PM    Glucose NEGATIVE  12/02/2016 05:46 PM    Ketone TRACE 12/02/2016 05:46 PM    Bilirubin NEGATIVE  07/08/2012 12:10 AM    Urobilinogen 1.0 12/02/2016 05:46 PM    Nitrites NEGATIVE  12/02/2016 05:46 PM    Leukocyte Esterase NEGATIVE  12/02/2016 05:46 PM    Epithelial cells MANY 12/02/2016 05:46 PM    Bacteria 1+ 12/02/2016 05:46 PM    WBC 0-4 12/02/2016 05:46 PM    RBC 0-5 12/02/2016 05:46 PM       MEDICATIONS:  Current Facility-Administered Medications   Medication Dose Route Frequency    warfarin (COUMADIN) tablet 5 mg  5 mg Oral ONCE    potassium chloride SR (KLOR-CON 10) tablet 20 mEq  20 mEq Oral TID    bumetanide (BUMEX) injection 1 mg  1 mg IntraVENous BID    bisacodyl (DULCOLAX) tablet 5 mg  5 mg Oral DAILY    spironolactone (ALDACTONE) tablet 25 mg  25 mg Oral DAILY    isosorbide mononitrate ER (IMDUR) tablet 30 mg  30 mg Oral DAILY    losartan (COZAAR) tablet 25 mg  25 mg Oral DAILY    influenza vaccine 2017-18 (3 yrs+)(PF) (FLUZONE QUAD/FLUARIX QUAD) injection 0.5 mL  0.5 mL IntraMUSCular PRIOR TO DISCHARGE    carvedilol (COREG) tablet 25 mg  25 mg Oral BID WITH MEALS    nystatin (MYCOSTATIN) 100,000 unit/gram powder   Topical TID    allopurinol (ZYLOPRIM) tablet 100 mg  100 mg Oral DAILY    docusate (COLACE) 50 mg/5 mL oral liquid 50 mg  50 mg Oral BID PRN    gabapentin (NEURONTIN) capsule 400 mg  400 mg Oral QHS    guaiFENesin ER (MUCINEX) tablet 600 mg  600 mg Oral BID    lactobac ac& pc-s.therm-b.anim (PHI Q/RISAQUAD)  1 Cap Oral DAILY    levothyroxine (SYNTHROID) tablet 112 mcg  112 mcg Oral ACB    loratadine (CLARITIN) tablet 10 mg  10 mg Oral DAILY    pantoprazole (PROTONIX) tablet 40 mg  40 mg Oral DAILY    venlafaxine (EFFEXOR) tablet 75 mg  75 mg Oral BID    sodium chloride (NS) flush 5-10 mL  5-10 mL IntraVENous Q8H    sodium chloride (NS) flush 5-10 mL  5-10 mL IntraVENous PRN    ondansetron (ZOFRAN) injection 4 mg  4 mg IntraVENous Q4H PRN    albuterol-ipratropium (DUO-NEB) 2.5 MG-0.5 MG/3 ML  3 mL Nebulization Q4H PRN    warfarin - pharmacy to dose   Other Rx Dosing/Monitoring    acetaminophen (TYLENOL) tablet 650 mg  650 mg Oral Q4H PRN

## 2017-10-15 LAB
ALBUMIN SERPL-MCNC: 3.1 G/DL (ref 3.5–5)
ALBUMIN/GLOB SERPL: 0.9 {RATIO} (ref 1.1–2.2)
ALP SERPL-CCNC: 127 U/L (ref 45–117)
ALT SERPL-CCNC: 12 U/L (ref 12–78)
ANION GAP SERPL CALC-SCNC: 7 MMOL/L (ref 5–15)
AST SERPL-CCNC: 13 U/L (ref 15–37)
BASOPHILS # BLD: 0 K/UL (ref 0–0.1)
BASOPHILS NFR BLD: 0 % (ref 0–1)
BILIRUB SERPL-MCNC: 1.2 MG/DL (ref 0.2–1)
BUN SERPL-MCNC: 25 MG/DL (ref 6–20)
BUN/CREAT SERPL: 14 (ref 12–20)
CALCIUM SERPL-MCNC: 8.8 MG/DL (ref 8.5–10.1)
CHLORIDE SERPL-SCNC: 94 MMOL/L (ref 97–108)
CO2 SERPL-SCNC: 37 MMOL/L (ref 21–32)
CREAT SERPL-MCNC: 1.83 MG/DL (ref 0.55–1.02)
EOSINOPHIL # BLD: 0.4 K/UL (ref 0–0.4)
EOSINOPHIL NFR BLD: 6 % (ref 0–7)
ERYTHROCYTE [DISTWIDTH] IN BLOOD BY AUTOMATED COUNT: 15.6 % (ref 11.5–14.5)
GLOBULIN SER CALC-MCNC: 3.6 G/DL (ref 2–4)
GLUCOSE SERPL-MCNC: 99 MG/DL (ref 65–100)
HCT VFR BLD AUTO: 35.9 % (ref 35–47)
HGB BLD-MCNC: 11.1 G/DL (ref 11.5–16)
INR PPP: 1.5 (ref 0.9–1.1)
LYMPHOCYTES # BLD: 1.1 K/UL (ref 0.8–3.5)
LYMPHOCYTES NFR BLD: 18 % (ref 12–49)
MAGNESIUM SERPL-MCNC: 1.8 MG/DL (ref 1.6–2.4)
MCH RBC QN AUTO: 28.6 PG (ref 26–34)
MCHC RBC AUTO-ENTMCNC: 30.9 G/DL (ref 30–36.5)
MCV RBC AUTO: 92.5 FL (ref 80–99)
MONOCYTES # BLD: 0.8 K/UL (ref 0–1)
MONOCYTES NFR BLD: 13 % (ref 5–13)
NEUTS SEG # BLD: 3.8 K/UL (ref 1.8–8)
NEUTS SEG NFR BLD: 63 % (ref 32–75)
PHOSPHATE SERPL-MCNC: 3.6 MG/DL (ref 2.6–4.7)
PLATELET # BLD AUTO: 235 K/UL (ref 150–400)
POTASSIUM SERPL-SCNC: 4.1 MMOL/L (ref 3.5–5.1)
PROT SERPL-MCNC: 6.7 G/DL (ref 6.4–8.2)
PROTHROMBIN TIME: 14.9 SEC (ref 9–11.1)
RBC # BLD AUTO: 3.88 M/UL (ref 3.8–5.2)
SODIUM SERPL-SCNC: 138 MMOL/L (ref 136–145)
WBC # BLD AUTO: 6 K/UL (ref 3.6–11)

## 2017-10-15 PROCEDURE — 74011250637 HC RX REV CODE- 250/637: Performed by: HOSPITALIST

## 2017-10-15 PROCEDURE — 36415 COLL VENOUS BLD VENIPUNCTURE: CPT | Performed by: INTERNAL MEDICINE

## 2017-10-15 PROCEDURE — 84100 ASSAY OF PHOSPHORUS: CPT | Performed by: INTERNAL MEDICINE

## 2017-10-15 PROCEDURE — 65660000000 HC RM CCU STEPDOWN

## 2017-10-15 PROCEDURE — 94640 AIRWAY INHALATION TREATMENT: CPT

## 2017-10-15 PROCEDURE — 74011250637 HC RX REV CODE- 250/637: Performed by: INTERNAL MEDICINE

## 2017-10-15 PROCEDURE — 74011000250 HC RX REV CODE- 250: Performed by: INTERNAL MEDICINE

## 2017-10-15 PROCEDURE — 83735 ASSAY OF MAGNESIUM: CPT | Performed by: INTERNAL MEDICINE

## 2017-10-15 PROCEDURE — 80053 COMPREHEN METABOLIC PANEL: CPT | Performed by: INTERNAL MEDICINE

## 2017-10-15 PROCEDURE — 77030029684 HC NEB SM VOL KT MONA -A

## 2017-10-15 PROCEDURE — 85025 COMPLETE CBC W/AUTO DIFF WBC: CPT | Performed by: INTERNAL MEDICINE

## 2017-10-15 PROCEDURE — 74011000250 HC RX REV CODE- 250: Performed by: HOSPITALIST

## 2017-10-15 PROCEDURE — 85610 PROTHROMBIN TIME: CPT | Performed by: INTERNAL MEDICINE

## 2017-10-15 PROCEDURE — 74011250637 HC RX REV CODE- 250/637: Performed by: NURSE PRACTITIONER

## 2017-10-15 RX ORDER — SENNOSIDES 8.6 MG/1
1 TABLET ORAL DAILY
Status: DISCONTINUED | OUTPATIENT
Start: 2017-10-15 | End: 2017-10-16 | Stop reason: HOSPADM

## 2017-10-15 RX ORDER — CARVEDILOL 12.5 MG/1
25 TABLET ORAL 2 TIMES DAILY WITH MEALS
Status: DISCONTINUED | OUTPATIENT
Start: 2017-10-15 | End: 2017-10-16 | Stop reason: HOSPADM

## 2017-10-15 RX ORDER — WARFARIN SODIUM 5 MG/1
5 TABLET ORAL ONCE
Status: COMPLETED | OUTPATIENT
Start: 2017-10-15 | End: 2017-10-15

## 2017-10-15 RX ADMIN — Medication 10 ML: at 21:17

## 2017-10-15 RX ADMIN — LOSARTAN POTASSIUM 25 MG: 25 TABLET ORAL at 09:32

## 2017-10-15 RX ADMIN — WARFARIN SODIUM 5 MG: 5 TABLET ORAL at 16:04

## 2017-10-15 RX ADMIN — NYSTATIN: 100000 POWDER TOPICAL at 22:00

## 2017-10-15 RX ADMIN — CARVEDILOL 12.5 MG: 12.5 TABLET, FILM COATED ORAL at 09:32

## 2017-10-15 RX ADMIN — GUAIFENESIN 600 MG: 600 TABLET, EXTENDED RELEASE ORAL at 21:16

## 2017-10-15 RX ADMIN — VENLAFAXINE 75 MG: 37.5 TABLET ORAL at 20:06

## 2017-10-15 RX ADMIN — POTASSIUM CHLORIDE 20 MEQ: 750 TABLET, FILM COATED, EXTENDED RELEASE ORAL at 09:32

## 2017-10-15 RX ADMIN — POTASSIUM CHLORIDE 20 MEQ: 750 TABLET, FILM COATED, EXTENDED RELEASE ORAL at 16:04

## 2017-10-15 RX ADMIN — GABAPENTIN 400 MG: 400 CAPSULE ORAL at 21:16

## 2017-10-15 RX ADMIN — VENLAFAXINE 75 MG: 37.5 TABLET ORAL at 09:32

## 2017-10-15 RX ADMIN — SENNOSIDES 8.6 MG: 8.6 TABLET, FILM COATED ORAL at 14:32

## 2017-10-15 RX ADMIN — IPRATROPIUM BROMIDE AND ALBUTEROL SULFATE 3 ML: .5; 3 SOLUTION RESPIRATORY (INHALATION) at 12:38

## 2017-10-15 RX ADMIN — POTASSIUM CHLORIDE 20 MEQ: 750 TABLET, FILM COATED, EXTENDED RELEASE ORAL at 21:16

## 2017-10-15 RX ADMIN — Medication 1 CAPSULE: at 09:32

## 2017-10-15 RX ADMIN — BUMETANIDE 1 MG: 0.25 INJECTION INTRAMUSCULAR; INTRAVENOUS at 09:32

## 2017-10-15 RX ADMIN — PANTOPRAZOLE SODIUM 40 MG: 40 TABLET, DELAYED RELEASE ORAL at 09:32

## 2017-10-15 RX ADMIN — BISACODYL 5 MG: 5 TABLET, COATED ORAL at 09:32

## 2017-10-15 RX ADMIN — ALLOPURINOL 100 MG: 100 TABLET ORAL at 09:32

## 2017-10-15 RX ADMIN — BUMETANIDE 1 MG: 0.25 INJECTION INTRAMUSCULAR; INTRAVENOUS at 20:06

## 2017-10-15 RX ADMIN — SPIRONOLACTONE 25 MG: 25 TABLET, FILM COATED ORAL at 09:32

## 2017-10-15 RX ADMIN — ISOSORBIDE MONONITRATE 30 MG: 30 TABLET ORAL at 09:32

## 2017-10-15 RX ADMIN — LEVOTHYROXINE SODIUM 112 MCG: 112 TABLET ORAL at 05:33

## 2017-10-15 RX ADMIN — NYSTATIN: 100000 POWDER TOPICAL at 16:04

## 2017-10-15 RX ADMIN — CARVEDILOL 25 MG: 12.5 TABLET, FILM COATED ORAL at 16:05

## 2017-10-15 RX ADMIN — Medication 10 ML: at 05:33

## 2017-10-15 RX ADMIN — GUAIFENESIN 600 MG: 600 TABLET, EXTENDED RELEASE ORAL at 09:32

## 2017-10-15 RX ADMIN — LORATADINE 10 MG: 10 TABLET ORAL at 09:32

## 2017-10-15 RX ADMIN — NYSTATIN: 100000 POWDER TOPICAL at 09:31

## 2017-10-15 NOTE — PROGRESS NOTES
NAME: Marina Bender        :  1940        MRN:  [id number]        Assessment :    Plan:  --CKD-3/4-Dr. Thedora Galeazzi creatinine around 1.7   A. Fib  Pleural effusion  Noncompliance with meds and office visits. Volume overload --Creatinine around baseline with needed diuresis. May need to tolerate higher Cr to achieve better volume status    Continue KCL     Hold losartan. Avoid hypotension    Agree with current diuretics. May need to consider a few doses of diamox if contraction alkalosis worsens    Strict I/Os  Am labs       Subjective:     Chief Complaint:  Hypotensive yesterday-> better now. No N/V. No CP. Good appetite    Review of Systems:    Symptom Y/N Comments  Symptom Y/N Comments   Fever/Chills    Chest Pain     Poor Appetite    Edema     Cough    Abdominal Pain     Sputum    Joint Pain     SOB/KENT    Pruritis/Rash     Nausea/vomit    Tolerating PT/OT     Diarrhea    Tolerating Diet     Constipation    Other       Could not obtain due to:      Objective:     VITALS:   Last 24hrs VS reviewed since prior progress note.  Most recent are:  Visit Vitals    /56 (BP 1 Location: Right arm, BP Patient Position: At rest)    Pulse (!) 109    Temp 97.4 °F (36.3 °C)    Resp 16    Ht 5' 9\" (1.753 m)    Wt (!) 167.6 kg (369 lb 7.9 oz)    SpO2 98%    Breastfeeding No    BMI 54.56 kg/m2       Intake/Output Summary (Last 24 hours) at 10/15/17 1143  Last data filed at 10/15/17 0356   Gross per 24 hour   Intake                0 ml   Output             1250 ml   Net            -1250 ml      Telemetry Reviewed:     PHYSICAL EXAM:  General: NAD  Rhonchi bilaterally anteriorly  abd soft, obese  ++ edema      Lab Data Reviewed: (see below)    Medications Reviewed: (see below)    PMH/SH reviewed - no change compared to H&P  ________________________________________________________________________  Care Plan discussed with:  Patient Y    Family      RN Y    Care Manager                    Consultant:          Comments   >50% of visit spent in counseling and coordination of care       ________________________________________________________________________  Kei Taylor MD     Procedures: see electronic medical records for all procedures/Xrays and details which  were not copied into this note but were reviewed prior to creation of Plan. LABS:  Recent Labs      10/15/17   0527   WBC  6.0   HGB  11.1*   HCT  35.9   PLT  235     Recent Labs      10/15/17   0530  10/14/17   0358  10/13/17   0212   NA  138  136  137   K  4.1  3.9  3.7   CL  94*  92*  91*   CO2  37*  37*  40*   BUN  25*  24*  22*   CREA  1.83*  1.81*  1.70*   GLU  99  98  113*   CA  8.8  9.0  8.8   MG  1.8   --   1.7   PHOS  3.6   --    --      Recent Labs      10/15/17   0530   SGOT  13*   AP  127*   TP  6.7   ALB  3.1*   GLOB  3.6     Recent Labs      10/15/17   0527  10/14/17   0358  10/13/17   0212   INR  1.5*  1.2*  1.2*   PTP  14.9*  12.6*  12.0*      No results for input(s): FE, TIBC, PSAT, FERR in the last 72 hours. No results found for: FOL, RBCF   No results for input(s): PH, PCO2, PO2 in the last 72 hours. No results for input(s): CPK, CKMB in the last 72 hours.     No lab exists for component: TROPONINI  No components found for: [place]  Lab Results   Component Value Date/Time    Color DARK YELLOW 12/02/2016 05:46 PM    Appearance TURBID 12/02/2016 05:46 PM    Specific gravity 1.022 12/02/2016 05:46 PM    Specific gravity 1.015 03/16/2012 01:55 AM    pH (UA) 5.0 12/02/2016 05:46 PM    Protein 30 12/02/2016 05:46 PM    Glucose NEGATIVE  12/02/2016 05:46 PM    Ketone TRACE 12/02/2016 05:46 PM    Bilirubin NEGATIVE  07/08/2012 12:10 AM    Urobilinogen 1.0 12/02/2016 05:46 PM    Nitrites NEGATIVE  12/02/2016 05:46 PM    Leukocyte Esterase NEGATIVE  12/02/2016 05:46 PM    Epithelial cells MANY 12/02/2016 05:46 PM    Bacteria 1+ 12/02/2016 05:46 PM    WBC 0-4 12/02/2016 05:46 PM    RBC 0-5 12/02/2016 05:46 PM       MEDICATIONS:  Current Facility-Administered Medications   Medication Dose Route Frequency    warfarin (COUMADIN) tablet 5 mg  5 mg Oral ONCE    carvedilol (COREG) tablet 25 mg  25 mg Oral BID WITH MEALS    potassium chloride SR (KLOR-CON 10) tablet 20 mEq  20 mEq Oral TID    bumetanide (BUMEX) injection 1 mg  1 mg IntraVENous BID    bisacodyl (DULCOLAX) tablet 5 mg  5 mg Oral DAILY    spironolactone (ALDACTONE) tablet 25 mg  25 mg Oral DAILY    isosorbide mononitrate ER (IMDUR) tablet 30 mg  30 mg Oral DAILY    influenza vaccine 2017-18 (3 yrs+)(PF) (FLUZONE QUAD/FLUARIX QUAD) injection 0.5 mL  0.5 mL IntraMUSCular PRIOR TO DISCHARGE    nystatin (MYCOSTATIN) 100,000 unit/gram powder   Topical TID    allopurinol (ZYLOPRIM) tablet 100 mg  100 mg Oral DAILY    docusate (COLACE) 50 mg/5 mL oral liquid 50 mg  50 mg Oral BID PRN    gabapentin (NEURONTIN) capsule 400 mg  400 mg Oral QHS    guaiFENesin ER (MUCINEX) tablet 600 mg  600 mg Oral BID    lactobac ac& pc-s.therm-b.anim (PHI Q/RISAQUAD)  1 Cap Oral DAILY    levothyroxine (SYNTHROID) tablet 112 mcg  112 mcg Oral ACB    loratadine (CLARITIN) tablet 10 mg  10 mg Oral DAILY    pantoprazole (PROTONIX) tablet 40 mg  40 mg Oral DAILY    venlafaxine (EFFEXOR) tablet 75 mg  75 mg Oral BID    sodium chloride (NS) flush 5-10 mL  5-10 mL IntraVENous Q8H    sodium chloride (NS) flush 5-10 mL  5-10 mL IntraVENous PRN    ondansetron (ZOFRAN) injection 4 mg  4 mg IntraVENous Q4H PRN    albuterol-ipratropium (DUO-NEB) 2.5 MG-0.5 MG/3 ML  3 mL Nebulization Q4H PRN    warfarin - pharmacy to dose   Other Rx Dosing/Monitoring    acetaminophen (TYLENOL) tablet 650 mg  650 mg Oral Q4H PRN

## 2017-10-15 NOTE — PROGRESS NOTES
Bedside and Verbal shift change report given to Shahid Miller (oncoming nurse) by Maral Jennings (offgoing nurse). Report included the following information SBAR, Kardex, Intake/Output, MAR, Recent Results and Cardiac Rhythm afib.

## 2017-10-15 NOTE — PROGRESS NOTES
Pharmacist Note  Warfarin Dosing  Consult provided for this 69 y. o.female to manage warfarin for h/o Atrial Fibrillation. INR Goal: 2 - 3    Home regimen/ tablet size: 2.5 mg on M/W/TH/SA; 5 mg on T/F/Sun    Drugs that may increase INR:  Allopurinol (home med)  Drugs that may decrease INR: None  Other current anticoagulants/ drugs that may increase bleeding risk: none  Risk factors: Age > 65, Acute on chronic diastolic CHF  Daily INR ordered: YES    Recent Labs      10/15/17   0527  10/14/17   0358  10/13/17   0212   HGB  11.1*   --    --    INR  1.5*  1.2*  1.2*     Date               INR                  Dose  10/06            2.0      --   10/07  --  7.5 mg    10/08              2.3                    5 mg  10/09              2.2                    5 mg  10/10              2.2                    5 mg  10/11              2.2                    held and 2.5 mg IV vitamin K given for thoracentesis on 10/12  10/12              1.3                    5 mg- okay to resume per NP  10/13              1.2                    5 mg  10/14              1.2                    5 mg   10/15              1.5                    5 mg                                                                               Assessment/ Plan: Will order warfarin 5 mg PO x 1 today. Pharmacy will continue to monitor daily and adjust therapy as indicated.

## 2017-10-15 NOTE — PROGRESS NOTES
Bedside and Verbal shift change report given to Danii Morris 45. Report included the following information SBAR.

## 2017-10-15 NOTE — PROGRESS NOTES
Hospitalist Progress Note  Meryle Mark, MD  Office: [phone]        Date of Service:  10/15/2017  NAME:  Nani Ordoñez  :  1940  MRN:  [id number]      Admission Summary:   58-year-old woman with a past medical history significant for bilateral lower extremity edema, atrialfibrillation, obstructive sleep apnea, COPD, hypothyroidism, hypertension, depression, chronic kidney disease stage 3, morbid obesity, admitted for acute on chronic diastolic HF. Interval history / Subjective:   S/p left thoracentesis, 700 cc pulled off  Patient feeling tired today. Denies sob,chest pain. Nurse reporting that SBP has been in lower range. Assessment & Plan:     Acute on chronic diastolic CHF  - bumex 1mg IV bid,   - c/w coreg, losartan, imdur  - EF 45 %. There was possible moderate hypokinesis of the apical wall(s). - card's following  - 10/13 d/c  Metolazone    Left mod pleural effusion - improved s/p thoracentesis  - 10/9 Cxr: with improvement  - 10/11CXR 10/11: no improvement  - 10/11 Vit K 2.5mg X 1 now, check INR in 6hr  - 10/12 US guided thoracentesis, follow labs     A.fib  - on coumadin and coreg  - 10/12 restart coumdain    Sleep apnea  - pt intolerant of BiPAP or CPAP     Morbid obesity  - encouraged lifestyle modification     CKD-3  - baseline Cr 1.7  - 10/12 1.59  - 10/13 1.70  - 10/15  1.83    -nephrology following    Hypokalemia and hypo magnesemia: corrected    Wound care for LE lymphedema with small ulcer.     COPD: nebs and home meds. HTN coreg, losartan    DVT PPX: on coumadin  Care Plan discussed with: patient  Disposition: rehab after diuresis     Hospital Problems  Date Reviewed: 10/7/2017          Codes Class Noted POA    * (Principal)[facility] ICD-10-CM: I50.33  ICD-9-CM: 428.33  10/6/2017 Yes                Review of Systems:   No chest pain, headache, dizziness.   Feeling tired,no short of breath. Vital Signs:    Last 24hrs VS reviewed since prior progress note. Most recent are:  Visit Vitals    /56 (BP 1 Location: Right arm, BP Patient Position: At rest)    Pulse (!) 102    Temp 97.4 °F (36.3 °C)    Resp 16    Ht 5' 9\" (1.753 m)    Wt (!) 167.6 kg (369 lb 7.9 oz)    SpO2 97%    Breastfeeding No    BMI 54.56 kg/m2         Intake/Output Summary (Last 24 hours) at 10/15/17 0935  Last data filed at 10/15/17 0356   Gross per 24 hour   Intake                0 ml   Output             1250 ml   Net            -1250 ml        Physical Examination:       Gen: Not in acute distress  HEENT: atraumatic, normocephalic, dry mucous membranes  CVS: Irregularly irregular, normal rate, afib on tele  RS: Diminished bilaterally posteriorly  GI : obese, NT, bowel sounds present   Extremities: LE edematous, lymph edema bilaterally  Neuro: Alert and oriented x3    Data Review:    Labs reviewed      Labs:     Recent Labs      10/15/17   0527   WBC  6.0   HGB  11.1*   HCT  35.9   PLT  235     Recent Labs      10/15/17   0530  10/14/17   0358  10/13/17   0212   NA  138  136  137   K  4.1  3.9  3.7   CL  94*  92*  91*   CO2  37*  37*  40*   BUN  25*  24*  22*   CREA  1.83*  1.81*  1.70*   GLU  99  98  113*   CA  8.8  9.0  8.8   MG  1.8   --   1.7   PHOS  3.6   --    --      Recent Labs      10/15/17   0530   SGOT  13*   ALT  12   AP  127*   TBILI  1.2*   TP  6.7   ALB  3.1*   GLOB  3.6     Recent Labs      10/15/17   0527  10/14/17   0358  10/13/17   0212   INR  1.5*  1.2*  1.2*   PTP  14.9*  12.6*  12.0*      No results for input(s): FE, TIBC, PSAT, FERR in the last 72 hours. No results found for: FOL, RBCF   No results for input(s): PH, PCO2, PO2 in the last 72 hours. No results for input(s): CPK, CKNDX, TROIQ in the last 72 hours.     No lab exists for component: CPKMB  No results found for: CHOL, CHOLX, CHLST, CHOLV, HDL, LDL, LDLC, DLDLP, TGLX, TRIGL, TRIGP, CHHD, CHHDX  Lab Results   Component Value Date/Time    Glucose (POC) 98 10/07/2017 11:07 AM    Glucose (POC) 92 10/07/2017 06:50 AM    Glucose (POC) 169 12/05/2016 04:43 PM    Glucose (POC) 126 12/05/2016 12:15 PM    Glucose (POC) 112 12/05/2016 08:23 AM     Lab Results   Component Value Date/Time    Color DARK YELLOW 12/02/2016 05:46 PM    Appearance TURBID 12/02/2016 05:46 PM    Specific gravity 1.022 12/02/2016 05:46 PM    Specific gravity 1.015 03/16/2012 01:55 AM    pH (UA) 5.0 12/02/2016 05:46 PM    Protein 30 12/02/2016 05:46 PM    Glucose NEGATIVE  12/02/2016 05:46 PM    Ketone TRACE 12/02/2016 05:46 PM    Bilirubin NEGATIVE  07/08/2012 12:10 AM    Urobilinogen 1.0 12/02/2016 05:46 PM    Nitrites NEGATIVE  12/02/2016 05:46 PM    Leukocyte Esterase NEGATIVE  12/02/2016 05:46 PM    Epithelial cells MANY 12/02/2016 05:46 PM    Bacteria 1+ 12/02/2016 05:46 PM    WBC 0-4 12/02/2016 05:46 PM    RBC 0-5 12/02/2016 05:46 PM         Medications Reviewed:     Current Facility-Administered Medications   Medication Dose Route Frequency    warfarin (COUMADIN) tablet 5 mg  5 mg Oral ONCE    carvedilol (COREG) tablet 25 mg  25 mg Oral BID WITH MEALS    potassium chloride SR (KLOR-CON 10) tablet 20 mEq  20 mEq Oral TID    bumetanide (BUMEX) injection 1 mg  1 mg IntraVENous BID    bisacodyl (DULCOLAX) tablet 5 mg  5 mg Oral DAILY    spironolactone (ALDACTONE) tablet 25 mg  25 mg Oral DAILY    isosorbide mononitrate ER (IMDUR) tablet 30 mg  30 mg Oral DAILY    losartan (COZAAR) tablet 25 mg  25 mg Oral DAILY    influenza vaccine 2017-18 (3 yrs+)(PF) (FLUZONE QUAD/FLUARIX QUAD) injection 0.5 mL  0.5 mL IntraMUSCular PRIOR TO DISCHARGE    nystatin (MYCOSTATIN) 100,000 unit/gram powder   Topical TID    allopurinol (ZYLOPRIM) tablet 100 mg  100 mg Oral DAILY    docusate (COLACE) 50 mg/5 mL oral liquid 50 mg  50 mg Oral BID PRN    gabapentin (NEURONTIN) capsule 400 mg  400 mg Oral QHS    guaiFENesin ER (MUCINEX) tablet 600 mg  600 mg Oral BID    lactobac ac& pc-s.therm-b.anim (PHI Q/RISAQUAD)  1 Cap Oral DAILY    levothyroxine (SYNTHROID) tablet 112 mcg  112 mcg Oral ACB    loratadine (CLARITIN) tablet 10 mg  10 mg Oral DAILY    pantoprazole (PROTONIX) tablet 40 mg  40 mg Oral DAILY    venlafaxine (EFFEXOR) tablet 75 mg  75 mg Oral BID    sodium chloride (NS) flush 5-10 mL  5-10 mL IntraVENous Q8H    sodium chloride (NS) flush 5-10 mL  5-10 mL IntraVENous PRN    ondansetron (ZOFRAN) injection 4 mg  4 mg IntraVENous Q4H PRN    albuterol-ipratropium (DUO-NEB) 2.5 MG-0.5 MG/3 ML  3 mL Nebulization Q4H PRN    warfarin - pharmacy to dose   Other Rx Dosing/Monitoring    acetaminophen (TYLENOL) tablet 650 mg  650 mg Oral Q4H PRN     ______________________________________________________________________  EXPECTED LENGTH OF STAY: 4d 12h  ACTUAL LENGTH OF STAY:          9                 Meryle Mark, MD

## 2017-10-16 VITALS
OXYGEN SATURATION: 98 % | WEIGHT: 293 LBS | BODY MASS INDEX: 43.4 KG/M2 | TEMPERATURE: 98 F | DIASTOLIC BLOOD PRESSURE: 56 MMHG | HEIGHT: 69 IN | RESPIRATION RATE: 20 BRPM | HEART RATE: 86 BPM | SYSTOLIC BLOOD PRESSURE: 96 MMHG

## 2017-10-16 LAB
ANION GAP SERPL CALC-SCNC: 9 MMOL/L (ref 5–15)
BACTERIA SPEC CULT: NORMAL
BASOPHILS # BLD: 0 K/UL (ref 0–0.1)
BASOPHILS NFR BLD: 1 % (ref 0–1)
BUN SERPL-MCNC: 27 MG/DL (ref 6–20)
BUN/CREAT SERPL: 13 (ref 12–20)
CALCIUM SERPL-MCNC: 8.5 MG/DL (ref 8.5–10.1)
CHLORIDE SERPL-SCNC: 94 MMOL/L (ref 97–108)
CO2 SERPL-SCNC: 36 MMOL/L (ref 21–32)
CREAT SERPL-MCNC: 2.02 MG/DL (ref 0.55–1.02)
EOSINOPHIL # BLD: 0.4 K/UL (ref 0–0.4)
EOSINOPHIL NFR BLD: 7 % (ref 0–7)
ERYTHROCYTE [DISTWIDTH] IN BLOOD BY AUTOMATED COUNT: 15.4 % (ref 11.5–14.5)
GLUCOSE SERPL-MCNC: 107 MG/DL (ref 65–100)
GRAM STN SPEC: NORMAL
GRAM STN SPEC: NORMAL
HCT VFR BLD AUTO: 37 % (ref 35–47)
HGB BLD-MCNC: 11.4 G/DL (ref 11.5–16)
INR PPP: 1.7 (ref 0.9–1.1)
LYMPHOCYTES # BLD: 1.2 K/UL (ref 0.8–3.5)
LYMPHOCYTES NFR BLD: 22 % (ref 12–49)
MAGNESIUM SERPL-MCNC: 1.9 MG/DL (ref 1.6–2.4)
MCH RBC QN AUTO: 28.6 PG (ref 26–34)
MCHC RBC AUTO-ENTMCNC: 30.8 G/DL (ref 30–36.5)
MCV RBC AUTO: 93 FL (ref 80–99)
MONOCYTES # BLD: 0.7 K/UL (ref 0–1)
MONOCYTES NFR BLD: 12 % (ref 5–13)
NEUTS SEG # BLD: 3.4 K/UL (ref 1.8–8)
NEUTS SEG NFR BLD: 58 % (ref 32–75)
PHOSPHATE SERPL-MCNC: 3.4 MG/DL (ref 2.6–4.7)
PLATELET # BLD AUTO: 247 K/UL (ref 150–400)
POTASSIUM SERPL-SCNC: 4.2 MMOL/L (ref 3.5–5.1)
PROTHROMBIN TIME: 17.5 SEC (ref 9–11.1)
RBC # BLD AUTO: 3.98 M/UL (ref 3.8–5.2)
SERVICE CMNT-IMP: NORMAL
SODIUM SERPL-SCNC: 139 MMOL/L (ref 136–145)
WBC # BLD AUTO: 5.7 K/UL (ref 3.6–11)

## 2017-10-16 PROCEDURE — 74011250637 HC RX REV CODE- 250/637: Performed by: HOSPITALIST

## 2017-10-16 PROCEDURE — 74011250637 HC RX REV CODE- 250/637: Performed by: INTERNAL MEDICINE

## 2017-10-16 PROCEDURE — 85025 COMPLETE CBC W/AUTO DIFF WBC: CPT | Performed by: INTERNAL MEDICINE

## 2017-10-16 PROCEDURE — 84100 ASSAY OF PHOSPHORUS: CPT | Performed by: INTERNAL MEDICINE

## 2017-10-16 PROCEDURE — 80048 BASIC METABOLIC PNL TOTAL CA: CPT | Performed by: INTERNAL MEDICINE

## 2017-10-16 PROCEDURE — 74011250637 HC RX REV CODE- 250/637: Performed by: NURSE PRACTITIONER

## 2017-10-16 PROCEDURE — 74011000250 HC RX REV CODE- 250: Performed by: HOSPITALIST

## 2017-10-16 PROCEDURE — 85610 PROTHROMBIN TIME: CPT | Performed by: INTERNAL MEDICINE

## 2017-10-16 PROCEDURE — 36415 COLL VENOUS BLD VENIPUNCTURE: CPT | Performed by: INTERNAL MEDICINE

## 2017-10-16 PROCEDURE — 83735 ASSAY OF MAGNESIUM: CPT | Performed by: INTERNAL MEDICINE

## 2017-10-16 RX ORDER — SPIRONOLACTONE 25 MG/1
25 TABLET ORAL DAILY
Qty: 30 TAB | Refills: 1 | Status: SHIPPED
Start: 2017-10-16

## 2017-10-16 RX ORDER — NYSTATIN 100000 [USP'U]/G
POWDER TOPICAL 3 TIMES DAILY
Qty: 1 BOTTLE | Refills: 0 | Status: SHIPPED
Start: 2017-10-16

## 2017-10-16 RX ORDER — POTASSIUM CHLORIDE 750 MG/1
20 TABLET, FILM COATED, EXTENDED RELEASE ORAL 2 TIMES DAILY
Status: DISCONTINUED | OUTPATIENT
Start: 2017-10-16 | End: 2017-10-16 | Stop reason: HOSPADM

## 2017-10-16 RX ORDER — POLYETHYLENE GLYCOL 3350 17 G/17G
17 POWDER, FOR SOLUTION ORAL DAILY
Qty: 30 PACKET | Refills: 1 | Status: SHIPPED
Start: 2017-10-16 | End: 2019-11-15

## 2017-10-16 RX ORDER — WARFARIN SODIUM 5 MG/1
5 TABLET ORAL ONCE
Status: DISCONTINUED | OUTPATIENT
Start: 2017-10-16 | End: 2017-10-16 | Stop reason: HOSPADM

## 2017-10-16 RX ORDER — CARVEDILOL 12.5 MG/1
12.5 TABLET ORAL 2 TIMES DAILY WITH MEALS
Qty: 60 TAB | Refills: 1 | Status: SHIPPED
Start: 2017-10-16

## 2017-10-16 RX ORDER — ISOSORBIDE MONONITRATE 30 MG/1
30 TABLET, EXTENDED RELEASE ORAL DAILY
Qty: 30 TAB | Refills: 1 | Status: SHIPPED
Start: 2017-10-16 | End: 2019-11-15

## 2017-10-16 RX ORDER — POLYETHYLENE GLYCOL 3350 17 G/17G
17 POWDER, FOR SOLUTION ORAL DAILY
Status: DISCONTINUED | OUTPATIENT
Start: 2017-10-16 | End: 2017-10-16 | Stop reason: HOSPADM

## 2017-10-16 RX ORDER — ADHESIVE BANDAGE
30 BANDAGE TOPICAL
Status: COMPLETED | OUTPATIENT
Start: 2017-10-16 | End: 2017-10-16

## 2017-10-16 RX ORDER — BUMETANIDE 1 MG/1
2 TABLET ORAL 2 TIMES DAILY
Status: DISCONTINUED | OUTPATIENT
Start: 2017-10-16 | End: 2017-10-16 | Stop reason: HOSPADM

## 2017-10-16 RX ORDER — FUROSEMIDE 80 MG/1
80 TABLET ORAL 2 TIMES DAILY
Qty: 60 TAB | Refills: 1 | Status: SHIPPED
Start: 2017-10-16

## 2017-10-16 RX ADMIN — MAGNESIUM HYDROXIDE 30 ML: 400 SUSPENSION ORAL at 11:32

## 2017-10-16 RX ADMIN — NYSTATIN: 100000 POWDER TOPICAL at 08:59

## 2017-10-16 RX ADMIN — BUMETANIDE 1 MG: 0.25 INJECTION INTRAMUSCULAR; INTRAVENOUS at 08:59

## 2017-10-16 RX ADMIN — POLYETHYLENE GLYCOL 3350 17 G: 17 POWDER, FOR SOLUTION ORAL at 13:03

## 2017-10-16 RX ADMIN — CARVEDILOL 25 MG: 12.5 TABLET, FILM COATED ORAL at 08:59

## 2017-10-16 RX ADMIN — SPIRONOLACTONE 25 MG: 25 TABLET, FILM COATED ORAL at 08:58

## 2017-10-16 RX ADMIN — SENNOSIDES 8.6 MG: 8.6 TABLET, FILM COATED ORAL at 08:59

## 2017-10-16 RX ADMIN — VENLAFAXINE 75 MG: 37.5 TABLET ORAL at 08:59

## 2017-10-16 RX ADMIN — ALLOPURINOL 100 MG: 100 TABLET ORAL at 08:58

## 2017-10-16 RX ADMIN — LORATADINE 10 MG: 10 TABLET ORAL at 08:59

## 2017-10-16 RX ADMIN — PANTOPRAZOLE SODIUM 40 MG: 40 TABLET, DELAYED RELEASE ORAL at 08:59

## 2017-10-16 RX ADMIN — ISOSORBIDE MONONITRATE 30 MG: 30 TABLET ORAL at 08:59

## 2017-10-16 RX ADMIN — Medication 10 ML: at 14:00

## 2017-10-16 RX ADMIN — Medication 1 CAPSULE: at 08:58

## 2017-10-16 RX ADMIN — POTASSIUM CHLORIDE 20 MEQ: 750 TABLET, FILM COATED, EXTENDED RELEASE ORAL at 08:58

## 2017-10-16 RX ADMIN — Medication 10 ML: at 06:00

## 2017-10-16 RX ADMIN — LEVOTHYROXINE SODIUM 112 MCG: 112 TABLET ORAL at 07:12

## 2017-10-16 RX ADMIN — BISACODYL 5 MG: 5 TABLET, COATED ORAL at 08:58

## 2017-10-16 RX ADMIN — GUAIFENESIN 600 MG: 600 TABLET, EXTENDED RELEASE ORAL at 08:59

## 2017-10-16 NOTE — DISCHARGE SUMMARY
Discharge Summary     Patient:  Sheridan Escalante       MRN: [id number]       YOB: 1940       Age: 69 y.o. Date of admission:  10/6/2017    Date of discharge:  10/16/2017    Primary care provider: Dr. Merna Zaldivar MD    Admitting provider:  Gato Sebastian MD    Discharging provider:  Ashley Colmenares MD - [phone]  If unavailable, call [phone] and ask the  to page the triage hospitalist.    Consultations  · IP CONSULT TO CARDIOLOGY  · IP CONSULT TO CARDIOLOGY  · IP CONSULT TO NEPHROLOGY    Procedures  · * No surgery found *    Discharge destination:SNF. The patient is stable for discharge. Admission diagnosis  · Acute on chronic diastolic heart failure ([facility] [address]    Current Discharge Medication List      START taking these medications    Details   carvedilol (COREG) 12.5 mg tablet Take 1 Tab by mouth two (2) times daily (with meals). Qty: 60 Tab, Refills: 1      isosorbide mononitrate ER (IMDUR) 30 mg tablet Take 1 Tab by mouth daily. Qty: 30 Tab, Refills: 1      nystatin (MYCOSTATIN) powder Apply  to affected area three (3) times daily. APPLY TO bilateral groins and under both breasts  Indications: CUTANEOUS CANDIDIASIS  Qty: 1 Bottle, Refills: 0      polyethylene glycol (MIRALAX) 17 gram packet Take 1 Packet by mouth daily. Qty: 30 Packet, Refills: 1      spironolactone (ALDACTONE) 25 mg tablet Take 1 Tab by mouth daily. Qty: 30 Tab, Refills: 1         CONTINUE these medications which have CHANGED    Details   furosemide (LASIX) 80 mg tablet Take 1 Tab by mouth two (2) times a day. Qty: 60 Tab, Refills: 1         CONTINUE these medications which have NOT CHANGED    Details   !! warfarin (COUMADIN) 2.5 mg tablet Take 2.5 mg by mouth four (4) days a week. (2.5 mg on Ofk-Fnf-Mtuvl-Sat; 5 mg on Tues-Fri-Sun)      ! ! warfarin (COUMADIN) 5 mg tablet Take 5 mg by mouth three (3) days a week. (2.5 mg on Cqt-Jqo-Qoyqz-Sat; 5 mg on Tues-Fri-Sun)      guaiFENesin ER (MUCINEX) 600 mg ER tablet Take 600 mg by mouth two (2) times a day. ergocalciferol (ERGOCALCIFEROL) 50,000 unit capsule Take 50,000 Units by mouth.      levothyroxine (SYNTHROID) 112 mcg tablet Take 1 Tab by mouth Daily (before breakfast). Qty: 30 Tab, Refills: 0      allopurinol (ZYLOPRIM) 100 mg tablet Take 100 mg by mouth daily. POTASSIUM CHLORIDE (KLOR-CON PO) Take 20 mEq by mouth two (2) times a day. FERROUS SULFATE, DRIED (IRON, DRIED, PO) Take 65 mg by mouth two (2) times a day. LACTOBACILLUS RHAMNOSUS GG (PROBIOTIC PO) Take  by mouth. 15 billion       venlafaxine (EFFEXOR) 75 mg tablet Take 75 mg by mouth two (2) times a day. loratadine (CLARITIN) 10 mg tablet Take 10 mg by mouth daily. gabapentin (NEURONTIN) 400 mg capsule Take 400 mg by mouth nightly. pantoprazole (PROTONIX) 40 mg tablet Take 40 mg by mouth daily. !! - Potential duplicate medications found. Please discuss with provider. STOP taking these medications       metoprolol tartrate (LOPRESSOR) 50 mg tablet Comments:   Reason for Stopping: Follow-up Information     Follow up With Details Comments Contact Info    Merna Zaldivar MD In 2 weeks Discharge follow up  [address] Kayce Elder MD  week discharge follow up  [address] [facility]   [address]  [address] [address]  [phone]            Final discharge diagnoses and brief hospital course  Please also refer to the admission H&P for details on the presenting problem. 69-year-old woman with a past medical history significant for bilateral lower extremity edema, atrialfibrillation, obstructive sleep apnea, COPD, hypothyroidism, hypertension, depression, chronic kidney disease stage 3, morbid obesity, admitted for acute on chronic diastolic HF.     Acute on chronic diastolic CHF NYHA IV on admission NYHA II at discharge  - bumex 1mg IV bid changed to Lasix 80mg BID at discharge  - c/w coreg, imdur and Aldactone  - EF 45 %. There was possible moderate hypokinesis of the apical wall(s). - card's following  - 10/13 d/c  Metolazone     Left mod pleural effusion - improved s/p thoracentesis  - 10/9 Cxr: with improvement  - 10/11CXR 10/11: no improvement  - 10/11 Vit K 2.5mg X 1 now, check INR in 6hr  - 10/12 US guided thoracentesis, Cx Negative for 4 days      A.fib  - on coumadin and coreg  - 10/12 restart coumdain, INR 1.7 at discharge, check daily INR until 2-3      Sleep apnea  - pt intolerant of BiPAP or CPAP      Morbid obesity  - encouraged lifestyle modification      CKD-3  - baseline Cr 1.7  - 10/12 1.59  - 10/13 1.70  - 10/15  1.83    -nephrology following  -will have to accept higher creatinine to achieve better volume status  - f/u with  at outpt     Hypokalemia and hypo magnesemia: corrected     Wound care for LE lymphedema with small ulcer. Wound care       COPD: nebs and home meds. HTN coreg, lasix, aldactone       FOLLOW-UP CARE RECOMMENDATIONS:    It is very important that you keep follow-up appointment(s). Bring discharge papers, medication list (and/or medication bottles) to follow-up appointments for review by outpatient provider(s). FOLLOW-UP TESTS RECOMMENDED:     ONGOING TREATMENT PLAN:     - DAILY INR UNTIL INR 2-3  - REPEAT CHEM 8 IN 3 DAYS, SEND RESULTS TO DR.TRUDY FLYNN'S OFFICE    PENDING TEST RESULTS:  At the time of discharge the following test results are still pending: NONE  Please review these results as they become available. Specific symptoms to watch for: chest pain, shortness of breath, fever, chills, nausea, vomiting, diarrhea, change in mentation, falling, weakness, bleeding.     DIET:  Cardiac Diet    ACTIVITY:  Activity as tolerated and PT/OT Eval and Treat    WOUND CARE:   Left lower leg wounds- Every other day on ODD days, cleanse with normal saline, wipe wound bed clean and dry, apply a piece of Xeroform gauze that has been folded in half, cover with foam dressing. Moisturize leg with Aloe Vesta after foam dressing applied.     Left posterior thigh wound- Every 12 hours cleanse with soap and water, blot dry, apply a thick coat of S[employer] Protective Barrier with zinc oxide.     Aloe Vesta to bilateral lower legs every 12 hours     Specialty bed: Compella ordered via [facility]. Use only flat sheet and one incontinence pad. Please call [facility] if not delivered. EQUIPMENT needed:  NONE    INCIDENTAL FINDINGS:  NONE    GOALS OF CARE:  X  Eventual return to home/independent/assisted living     Long term SNF      Hospice     No rehospitalization     Patient condition at discharge:   Functional status    Poor    X  Deconditioned      Independent   Cognition  X  Lucid     Forgetful (some sensescence)     Dementia   Catheters/lines (plus indication)    Godinez     PICC      PEG         Code status  X  Full code      DNR        Physical examination at discharge  Visit Vitals    /55 (BP 1 Location: Right arm, BP Patient Position: At rest)    Pulse (!) 102    Temp 97.6 °F (36.4 °C)    Resp 18    Ht 5' 9\" (1.753 m)    Wt (!) 166.6 kg (367 lb 4.6 oz)    SpO2 94%    Breastfeeding No    BMI 54.24 kg/m2     AO3  PERRLA  Lung: CTA  CVS: RRR  Abd: soft NT ND, Obese  Ext: chronic lymphedema with superficial ulcers, edema improved    Pertinent imaging studies:  ECHO  SUMMARY:  Left ventricle: Systolic function was mildly reduced. Ejection fraction  was estimated to be 45 %. There was possible moderate hypokinesis of the  apical wall(s). Mitral valve: There was moderate thickening. There was mild to moderate  regurgitation. Aortic valve: There was moderate regurgitation. Tricuspid valve: There was mild regurgitation.       Recent Labs      10/16/17   0406  10/15/17   0527   WBC  5.7  6.0   HGB  11.4*  11.1*   HCT  37.0  35.9   PLT  247  235 Recent Labs      10/16/17   0406  10/15/17   0530  10/14/17   0358   NA  139  138  136   K  4.2  4.1  3.9   CL  94*  94*  92*   CO2  36*  37*  37*   BUN  27*  25*  24*   CREA  2.02*  1.83*  1.81*   GLU  107*  99  98   CA  8.5  8.8  9.0   MG  1.9  1.8   --    PHOS  3.4  3.6   --      Recent Labs      10/15/17   0530   SGOT  13*   AP  127*   TP  6.7   ALB  3.1*   GLOB  3.6     Recent Labs      10/16/17   0406  10/15/17   0527  10/14/17   0358   INR  1.7*  1.5*  1.2*   PTP  17.5*  14.9*  12.6*      No results for input(s): FE, TIBC, PSAT, FERR in the last 72 hours. No results for input(s): PH, PCO2, PO2 in the last 72 hours. No results for input(s): CPK, CKMB in the last 72 hours.     No lab exists for component: TROPONINI  No components found for: [place]    Chronic Diagnoses:    Problem List as of 10/16/2017  Date Reviewed: 10/7/2017          Codes Class Noted - Resolved    * (Principal)Acute on chronic diastolic heart failure ([facility] ICD-10-CM: I50.33  ICD-9-CM: 428.33  10/6/2017 - Present        A-fib ([facility] ICD-10-CM: I48.91  ICD-9-CM: 427.31  12/2/2016 - Present        Physical deconditioning ICD-10-CM: R53.81  ICD-9-CM: 799.3  3/19/2014 - Present        Breast CA ([facility] ICD-10-CM: C50.919  ICD-9-CM: 174.9  7/15/2013 - Present        Recurrent epistaxis ICD-10-CM: R04.0  ICD-9-CM: 784.7  10/1/2012 - Present        Diastolic CHF, chronic (HCC) ICD-10-CM: I50.32  ICD-9-CM: 428.32, 428.0  10/1/2012 - Present        Atrial fibrillation ([address] ICD-10-CM: I48.91  ICD-9-CM: 427.31  10/1/2012 - Present        CKD (chronic kidney disease) stage 3, GFR 30-59 ml/min ICD-10-CM: N18.3  ICD-9-CM: 585.3  10/1/2012 - Present        Hypovolemia ICD-10-CM: E86.1  ICD-9-CM: 276.52  10/1/2012 - Present        Hypokalemia ICD-10-CM: E87.6  ICD-9-CM: 276.8  10/1/2012 - Present        History of complete heart block ICD-10-CM: Z86.79  ICD-9-CM: V12.59  10/1/2012 - Present        History of Clostridium difficile infection ICD-10-CM: Z86.19  ICD-9-CM: V12.09  10/1/2012 - Present        Diastolic CHF, acute on chronic [facility]) ICD-10-CM: I50.33  ICD-9-CM: 428.33, 428.0  5/4/2012 - Present        Hypokalemia ICD-10-CM: E87.6  ICD-9-CM: 276.8  5/4/2012 - Present        CKD (chronic kidney disease) stage 3, GFR 30-59 ml/min (Chronic) ICD-10-CM: N18.3  ICD-9-CM: 585.3  5/4/2012 - Present        Sepsis(995.91) ICD-10-CM: A41.9  ICD-9-CM: 995.91  5/3/2012 - Present        Intestinal infection due to Clostridium difficile ICD-10-CM: A04.72  ICD-9-CM: 008.45  5/3/2012 - Present        Complete heart block (HCC) ICD-10-CM: I44.2  ICD-9-CM: 426.0  4/5/2012 - Present        ARF (acute renal failure) (HCC) ICD-10-CM: N17.9  ICD-9-CM: 584.9  3/25/2012 - Present        Diarrhea ICD-10-CM: R19.7  ICD-9-CM: 787.91  3/25/2012 - Present        Anemia ICD-10-CM: D64.9  ICD-9-CM: 285.9  3/19/2012 - Present        Acute on chronic renal failure (HCC) ICD-10-CM: N17.9, N18.9  ICD-9-CM: 584.9, 585.9  3/16/2012 - Present        Unspecified hypothyroidism ICD-10-CM: E03.9  ICD-9-CM: 244.9  3/16/2012 - Present        Chronic airway obstruction, not elsewhere classified ICD-10-CM: J44.9  ICD-9-CM: 660  3/16/2012 - Present        Hypertensive heart disease with heart failure (HCC) ICD-10-CM: I11.0  ICD-9-CM: 402.91, 428.9  Unknown - Present    Overview Signed 1/1/2011  7:37 PM by Connor Vaughan MD     diastolic dysfunction             Pleural effusion ICD-10-CM: J90  ICD-9-CM: 511.9  Unknown - Present    Overview Signed 1/1/2011  7:37 PM by Connor Vaughan MD     s/p VATS             Elevated antinuclear antibody (MARYAM) level ICD-10-CM: R76.8  ICD-9-CM: 795.79  Unknown - Present        Other dyspnea and respiratory abnormality ICD-10-CM: R06.09, R09.89  ICD-9-CM: 786.09  Unknown - Present        Other lymphedema ICD-10-CM: I89.0  ICD-9-CM: 457.1  Unknown - Present        Morbid obesity with BMI of 50.0-59.9, adult (HCC) ICD-10-CM: E66.01, Z68.43  ICD-9-CM: 278.01, V85.43 Unknown - Present        Lymphedema (Chronic) ICD-10-CM: I89.0  ICD-9-CM: 457.1  Unknown - Present        Atrial fibrillation (HCC) (Chronic) ICD-10-CM: I48.91  ICD-9-CM: 427.31  Unknown - Present              Time spent on discharge related activities today greater than 30 minutes.       Signed:  Corina Mota MD                 Hospitalist, Internal Medicine      Cc: Nelida Lee MD

## 2017-10-16 NOTE — DISCHARGE INSTRUCTIONS
Discharging provider: Sonya Rowe MD    Primary care provider: Cat Yin MD          FINAL [address] [facility] COURSE:    78-year-old woman with a past medical history significant for bilateral lower extremity edema, atrialfibrillation, obstructive sleep apnea, COPD, hypothyroidism, hypertension, depression, chronic kidney disease stage 3, morbid obesity, admitted for acute on chronic diastolic HF. Acute on chronic diastolic CHF NYHA IV on admission NYHA II at discharge  - bumex 1mg IV bid changed to Lasix 80mg BID at discharge  - c/w coreg, imdur and Aldactone  - EF 45 %. There was possible moderate hypokinesis of the apical wall(s). - card's following  - 10/13 d/c  Metolazone     Left mod pleural effusion - improved s/p thoracentesis  - 10/9 Cxr: with improvement  - 10/11CXR 10/11: no improvement  - 10/11 Vit K 2.5mg X 1 now, check INR in 6hr  - 10/12 US guided thoracentesis, Cx Negative for 4 days      A.fib  - on coumadin and coreg  - 10/12 restart coumdain, INR 1.7 at discharge, check daily INR until 2-3      Sleep apnea  - pt intolerant of BiPAP or CPAP      Morbid obesity  - encouraged lifestyle modification      CKD-3  - baseline Cr 1.7  - 10/12 1.59  - 10/13 1.70  - 10/15  1.83    -nephrology following  -will have to accept higher creatinine to achieve better volume status  - f/u with  at outpt     Hypokalemia and hypo magnesemia: corrected     Wound care for LE lymphedema with small ulcer. Wound care       COPD: nebs and home meds.     HTN coreg, lasix, aldactone       FOLLOW-UP CARE RECOMMENDATIONS:    APPOINTMENTS:  Follow-up Information     Follow up With Details Comments Contact Info    Cat Yin MD In 2 weeks Discharge follow up  [address] [facility] [address] [facility] [address] Jennifer Griffin MD  week discharge follow up  [address] [facility] [address]  [address] [address] [phone]              It is very important that you keep follow-up appointment(s). Bring discharge papers, medication list (and/or medication bottles) to follow-up appointments for review by outpatient provider(s). FOLLOW-UP TESTS RECOMMENDED:     ONGOING TREATMENT PLAN:     - DAILY INR UNTIL INR 2-3  - REPEAT CHEM 8 IN 3 DAYS, SEND RESULTS TO DR.TRUDY FLYNN'S OFFICE    PENDING TEST RESULTS:  At the time of discharge the following test results are still pending: NONE  Please review these results as they become available. Specific symptoms to watch for: chest pain, shortness of breath, fever, chills, nausea, vomiting, diarrhea, change in mentation, falling, weakness, bleeding. DIET:  Cardiac Diet    ACTIVITY:  Activity as tolerated and PT/OT Eval and Treat    WOUND CARE:   Left lower leg wound- Every other day on ODD days, cleanse with normal saline, wipe wound bed clean and dry, apply a piece of Xeroform gauze that has been folded in half, cover with foam dressing. Moisturize leg with Aloe Vesta after foam dressing applied.     Left posterior thigh wound- Every 12 hours cleanse with soap and water, blot dry, apply a thick coat of S[employer] Protective Barrier with zinc oxide.     Aloe Vesta to bilateral lower legs every 12 hours     Specialty bed: Compella ordered via [employer]. Use only flat sheet and one incontinence pad. Please call [facility] if not delivered. EQUIPMENT needed:  NONE    INCIDENTAL FINDINGS:  NONE    GOALS OF CARE:  X  Eventual return to home/independent/assisted living     Long term SNF      Hospice     No rehospitalization     Patient condition at discharge:   Functional status    Poor    X  Deconditioned      Independent   Cognition  X  Lucid     Forgetful (some sensescence)     Dementia   Catheters/lines (plus indication)    Godinez     PICC      PEG         Code status  X  Full code      DNR    . . . . . . . . . . . . . . . . . . . . . . . . . . . . . . . . . . . . . . . . . . . . . . . . . . . . . . . . . . . . . . . . . . . Torsten Alves . . . Torsten berta      CHRONIC MEDICAL CONDITIONS:  Problem List as of 10/16/2017  Date Reviewed: 10/7/2017          Codes Class Noted - Resolved    * (Principal)Acute on chronic diastolic heart failure ([facility] ICD-10-CM: I50.33  ICD-9-CM: 428.33  10/6/2017 - Present        A-fib ([facility] ICD-10-CM: I48.91  ICD-9-CM: 427.31  12/2/2016 - Present        Physical deconditioning ICD-10-CM: R53.81  ICD-9-CM: 799.3  3/19/2014 - Present        Breast CA ([place] [address].) ICD-10-CM: C50.919  ICD-9-CM: 174.9  7/15/2013 - Present        Recurrent epistaxis ICD-10-CM: R04.0  ICD-9-CM: 784.7  10/1/2012 - Present        Diastolic CHF, chronic (HCC) ICD-10-CM: I50.32  ICD-9-CM: 428.32, 428.0  10/1/2012 - Present        Atrial fibrillation ([facility] ICD-10-CM: I48.91  ICD-9-CM: 427.31  10/1/2012 - Present        CKD (chronic kidney disease) stage 3, GFR 30-59 ml/min ICD-10-CM: N18.3  ICD-9-CM: 585.3  10/1/2012 - Present        Hypovolemia ICD-10-CM: E86.1  ICD-9-CM: 276.52  10/1/2012 - Present        Hypokalemia ICD-10-CM: E87.6  ICD-9-CM: 276.8  10/1/2012 - Present        History of complete heart block ICD-10-CM: Z86.79  ICD-9-CM: V12.59  10/1/2012 - Present        History of Clostridium difficile infection ICD-10-CM: Z86.19  ICD-9-CM: V12.09  10/1/2012 - Present        Diastolic CHF, acute on chronic (HCC) ICD-10-CM: I50.33  ICD-9-CM: 428.33, 428.0  5/4/2012 - Present        Hypokalemia ICD-10-CM: E87.6  ICD-9-CM: 276.8  5/4/2012 - Present        CKD (chronic kidney disease) stage 3, GFR 30-59 ml/min (Chronic) ICD-10-CM: N18.3  ICD-9-CM: 585.3  5/4/2012 - Present        Sepsis(995.91) ICD-10-CM: A41.9  ICD-9-CM: 995.91  5/3/2012 - Present        Intestinal infection due to Clostridium difficile ICD-10-CM: A04.72  ICD-9-CM: 008.45  5/3/2012 - Present        Complete heart block (HCC) ICD-10-CM: I44.2  ICD-9-CM: 426.0  4/5/2012 - Present        ARF (acute renal failure) ([facility] ICD-10-CM: N17.9  ICD-9-CM: 584.9  3/25/2012 - Present        Diarrhea ICD-10-CM: R19.7  ICD-9-CM: 787.91  3/25/2012 - Present        Anemia ICD-10-CM: D64.9  ICD-9-CM: 285.9  3/19/2012 - Present        Acute on chronic renal failure (HCC) ICD-10-CM: N17.9, N18.9  ICD-9-CM: 584.9, 585.9  3/16/2012 - Present        Unspecified hypothyroidism ICD-10-CM: E03.9  ICD-9-CM: 244.9  3/16/2012 - Present        Chronic airway obstruction, not elsewhere classified ICD-10-CM: J44.9  ICD-9-CM: 751  3/16/2012 - Present        Hypertensive heart disease with heart failure (HCC) ICD-10-CM: I11.0  ICD-9-CM: 402.91, 428.9  Unknown - Present    Overview Signed 1/1/2011  7:37 PM by Romana Drew, MD     diastolic dysfunction             Pleural effusion ICD-10-CM: J90  ICD-9-CM: 511.9  Unknown - Present    Overview Signed 1/1/2011  7:37 PM by Romana Drew, MD     s/p VATS             Elevated antinuclear antibody (MARYAM) level ICD-10-CM: R76.8  ICD-9-CM: 795.79  Unknown - Present        Other dyspnea and respiratory abnormality ICD-10-CM: R06.09, R09.89  ICD-9-CM: 786.09  Unknown - Present        Other lymphedema ICD-10-CM: I89.0  ICD-9-CM: 457.1  Unknown - Present        Morbid obesity with BMI of 50.0-59.9, adult (HCC) ICD-10-CM: E66.01, Z68.43  ICD-9-CM: 278.01, V85.43  Unknown - Present        Lymphedema (Chronic) ICD-10-CM: I89.0  ICD-9-CM: 457.1  Unknown - Present        Atrial fibrillation (HCC) (Chronic) ICD-10-CM: I48.91  ICD-9-CM: 427.31  Unknown - Present

## 2017-10-16 NOTE — PROGRESS NOTES
16:31 pt left via [facility] to go to [id number] [place] [address] [address] REPORT:    Verbal report given to Araseli(name) on Haily Asif  being transferred to [facility](unit) for routine progression of care       Report consisted of patients Situation, Background, Assessment and   Recommendations(SBAR). Information from the following report(s) SBAR, Kardex, ED Summary, Intake/Output, MAR, Recent Results and Cardiac Rhythm A-fib was reviewed with the receiving nurse. Lines:   Peripheral IV 10/13/17 (Active)   Site Assessment Clean, dry, & intact 10/16/2017 12:00 PM   Phlebitis Assessment 0 10/16/2017 12:00 PM   Infiltration Assessment 0 10/16/2017 12:00 PM   Dressing Status Clean, dry, & intact 10/16/2017 12:00 PM   Dressing Type Tape;Transparent 10/16/2017 12:00 PM   Hub Color/Line Status Blue;Capped 10/16/2017 12:00 PM   Action Taken Open ports on tubing capped 10/16/2017 12:00 PM   Alcohol Cap Used Yes 10/16/2017 12:00 PM        Opportunity for questions and clarification was provided.       Patient transported with:   O2 @ 2 liters

## 2017-10-16 NOTE — PROGRESS NOTES
Problem: Falls - Risk of  Goal: *Absence of Falls  Document Jacob Fall Risk and appropriate interventions in the flowsheet. Outcome: Progressing Towards Goal  Fall Risk Interventions:  Mobility Interventions: Communicate number of staff needed for ambulation/transfer           Medication Interventions: Patient to call before getting OOB, Teach patient to arise slowly     Elimination Interventions: Call light in reach, Patient to call for help with toileting needs     History of Falls Interventions: Consult care management for discharge planning, Door open when patient unattended     Bed in low position. Locked bed wheels. Call bell and personal items within reach. Side rails up x3. Hourly rounding performed.

## 2017-10-16 NOTE — PROGRESS NOTES
12:01 p.m. Update:  [facility] confirmed they can take patient today. Patient does need O2 so will need to go by ambulance. Patient had no ambulance preference, so referral sent to [facility]. They will transport at 4 pm today. Patient is discharged and has chosen [facility] . RN checking on whether she needs O2 on ride to SNF.

## 2017-10-16 NOTE — PROGRESS NOTES
NAME: Juan Soriano        :  1940        MRN:  [id number]        Assessment :    Plan:  --CKD-3/4-Dr. Nargis Pena creatinine around 1.7   A. Fib  Pleural effusion  Noncompliance with meds and office visits. Volume overload --Creatinine rising with needed diuresis. May need to tolerate higher Cr to achieve better volume status    Change to Oral Bumex 2mg BID  Decrease Oral KCL 20meq BID     Hold losartan. Avoid hypotension    May need to hold Aldactone if Cr continues to rise      Strict I/Os,avoid nephrotoxins    Awaiting rehab placement  Am labs (if still here)    F/u with Dr. Noemí Dodd in 2-3weeks post discharge       Subjective:     Chief Complaint:  No events overnight. No cramping. No N/V. No CP. Good appetite    Review of Systems:    Symptom Y/N Comments  Symptom Y/N Comments   Fever/Chills    Chest Pain     Poor Appetite    Edema     Cough    Abdominal Pain     Sputum    Joint Pain     SOB/KENT    Pruritis/Rash     Nausea/vomit    Tolerating PT/OT     Diarrhea    Tolerating Diet     Constipation    Other       Could not obtain due to:      Objective:     VITALS:   Last 24hrs VS reviewed since prior progress note.  Most recent are:  Visit Vitals    /64 (BP 1 Location: Right arm, BP Patient Position: At rest)    Pulse 100    Temp 97.5 °F (36.4 °C)    Resp 20    Ht 5' 9\" (1.753 m)    Wt (!) 166.6 kg (367 lb 4.6 oz)    SpO2 97%    Breastfeeding No    BMI 54.24 kg/m2       Intake/Output Summary (Last 24 hours) at 10/16/17 1131  Last data filed at 10/16/17 0910   Gross per 24 hour   Intake                0 ml   Output             1800 ml   Net            -1800 ml      Telemetry Reviewed:     PHYSICAL EXAM:  General: NAD  Rhonchi bilaterally anteriorly  abd soft, obese  ++ edema      Lab Data Reviewed: (see below)    Medications Reviewed: (see below)    PMH/SH reviewed - no change compared to H&P  ________________________________________________________________________  Care Plan discussed with:  Patient Y    Family      RN Y    Care Manager                    Consultant:          Comments   >50% of visit spent in counseling and coordination of care       ________________________________________________________________________  Christ Matthews MD     Procedures: see electronic medical records for all procedures/Xrays and details which  were not copied into this note but were reviewed prior to creation of Plan. LABS:  Recent Labs      10/16/17   0406  10/15/17   0527   WBC  5.7  6.0   HGB  11.4*  11.1*   HCT  37.0  35.9   PLT  247  235     Recent Labs      10/16/17   0406  10/15/17   0530  10/14/17   0358   NA  139  138  136   K  4.2  4.1  3.9   CL  94*  94*  92*   CO2  36*  37*  37*   BUN  27*  25*  24*   CREA  2.02*  1.83*  1.81*   GLU  107*  99  98   CA  8.5  8.8  9.0   MG  1.9  1.8   --    PHOS  3.4  3.6   --      Recent Labs      10/15/17   0530   SGOT  13*   AP  127*   TP  6.7   ALB  3.1*   GLOB  3.6     Recent Labs      10/16/17   0406  10/15/17   0527  10/14/17   0358   INR  1.7*  1.5*  1.2*   PTP  17.5*  14.9*  12.6*      No results for input(s): FE, TIBC, PSAT, FERR in the last 72 hours. No results found for: FOL, RBCF   No results for input(s): PH, PCO2, PO2 in the last 72 hours. No results for input(s): CPK, CKMB in the last 72 hours.     No lab exists for component: TROPONINI  No components found for: [place]  Lab Results   Component Value Date/Time    Color DARK YELLOW 12/02/2016 05:46 PM    Appearance TURBID 12/02/2016 05:46 PM    Specific gravity 1.022 12/02/2016 05:46 PM    Specific gravity 1.015 03/16/2012 01:55 AM    pH (UA) 5.0 12/02/2016 05:46 PM    Protein 30 12/02/2016 05:46 PM    Glucose NEGATIVE  12/02/2016 05:46 PM    Ketone TRACE 12/02/2016 05:46 PM    Bilirubin NEGATIVE  07/08/2012 12:10 AM    Urobilinogen 1.0 12/02/2016 05:46 PM    Nitrites NEGATIVE  12/02/2016 05:46 PM Leukocyte Esterase NEGATIVE  12/02/2016 05:46 PM    Epithelial cells MANY 12/02/2016 05:46 PM    Bacteria 1+ 12/02/2016 05:46 PM    WBC 0-4 12/02/2016 05:46 PM    RBC 0-5 12/02/2016 05:46 PM       MEDICATIONS:  Current Facility-Administered Medications   Medication Dose Route Frequency    polyethylene glycol (MIRALAX) packet 17 g  17 g Oral DAILY    magnesium hydroxide (MILK OF MAGNESIA) 400 mg/5 mL oral suspension 30 mL  30 mL Oral NOW    potassium chloride SR (KLOR-CON 10) tablet 20 mEq  20 mEq Oral BID    bumetanide (BUMEX) tablet 2 mg  2 mg Oral BID    carvedilol (COREG) tablet 25 mg  25 mg Oral BID WITH MEALS    senna (SENOKOT) tablet 8.6 mg  1 Tab Oral DAILY    bisacodyl (DULCOLAX) tablet 5 mg  5 mg Oral DAILY    spironolactone (ALDACTONE) tablet 25 mg  25 mg Oral DAILY    isosorbide mononitrate ER (IMDUR) tablet 30 mg  30 mg Oral DAILY    influenza vaccine 2017-18 (3 yrs+)(PF) (FLUZONE QUAD/FLUARIX QUAD) injection 0.5 mL  0.5 mL IntraMUSCular PRIOR TO DISCHARGE    nystatin (MYCOSTATIN) 100,000 unit/gram powder   Topical TID    allopurinol (ZYLOPRIM) tablet 100 mg  100 mg Oral DAILY    docusate (COLACE) 50 mg/5 mL oral liquid 50 mg  50 mg Oral BID PRN    gabapentin (NEURONTIN) capsule 400 mg  400 mg Oral QHS    guaiFENesin ER (MUCINEX) tablet 600 mg  600 mg Oral BID    lactobac ac& pc-s.therm-b.anim (PHI Q/RISAQUAD)  1 Cap Oral DAILY    levothyroxine (SYNTHROID) tablet 112 mcg  112 mcg Oral ACB    loratadine (CLARITIN) tablet 10 mg  10 mg Oral DAILY    pantoprazole (PROTONIX) tablet 40 mg  40 mg Oral DAILY    venlafaxine (EFFEXOR) tablet 75 mg  75 mg Oral BID    sodium chloride (NS) flush 5-10 mL  5-10 mL IntraVENous Q8H    sodium chloride (NS) flush 5-10 mL  5-10 mL IntraVENous PRN    ondansetron (ZOFRAN) injection 4 mg  4 mg IntraVENous Q4H PRN    albuterol-ipratropium (DUO-NEB) 2.5 MG-0.5 MG/3 ML  3 mL Nebulization Q4H PRN    warfarin - pharmacy to dose   Other Rx Dosing/Monitoring    acetaminophen (TYLENOL) tablet 650 mg  650 mg Oral Q4H PRN

## 2017-10-16 NOTE — WOUND CARE
Wound Care Note:     Follow-up visit for left leg wounds. Chart shows:  Admitted for acute on chronic diastolic heart failure. Past medical history of afib, arthritis, left breast cancer, cardiomegaly, CKD, C.diff, CHF, COPD, diarrhea, dyspepsia, elevated MARYAM level, hypothyroidism, GERD, hearing loss, heart failure, HTN, joint pain, leg swelling, long term use of anticoagulants, lymphedema, morbid obesity, pleural effusion, shortness of breath, thyroid disease, sleep apnea. WBC = 5.7 on 10/16/17  Admitted from home    Assessment:   Patient is A&O x 4, communicative, incontinent with maximum assistance needed in repositioning. Bed: [facility] bariatric  Patient has Pure Perlita Rincon in place. Diet:   Patient reports no pain    Bilateral heels, buttocks, and sacral skin intact and with blanchable erythema. Palpable DP pulses bilaterally    1. POA left proximal lower leg wound has healed    2. POA left distal lower leg was an area of two wounds; however, one of the wounds has healed and the last wound is much smaller and very superficial, wound measures 0.2 cm x 0.3 cm x 0.1 cm, wound bed is pink, no drainage, juliana-wound is blanchable erythema. 3. POA left posterior upper thigh wound has also greatly improved, wound measures 0.3 cm x 1.5 cm x 0.1 cm, wound bed is pink, no drainage, juliana-wound is blanchable erythema    Patient repositioned on right side   Heels offloaded on pillows. Recommendations:    Continue with current wound care on distal leg wound and left posterior upper thigh. Left lower leg wound- Every other day on ODD days, cleanse with normal saline, wipe wound bed clean and dry, apply a piece of Xeroform gauze that has been folded in half, cover with foam dressing.       Moisturize leg with Aloe Vesta after foam dressing applied.     Left posterior thigh wound- Every 12 hours cleanse with soap and water, blot dry, apply a thick coat of Sensicare Protective Barrier with zinc oxide.     Aloe Vesta to bilateral lower legs every 12 hours    Skin Care & Pressure Prevention:  Minimize layers of linen/pads under patient to optimize support surface. Turn/reposition approximately every 2 hours and offload heels. Manage incontinence     Discussed above plan with patient &  Champ Rader RN    Transition of Care: Patient has been discharged.       Blanka Rivera RN, BSN, Wound Care Nurse  office [phone]  pager [phone] or call  to page

## 2017-10-17 ENCOUNTER — PATIENT OUTREACH (OUTPATIENT)
Dept: CARDIOLOGY CLINIC | Age: 77
End: 2017-10-17

## 2017-10-17 NOTE — PROGRESS NOTES
This note will not be viewable in [address] uSkhjinder Petit is a 69 y.o. female - call to pt's  to check on status- she is in a regular bed - and needs bariatric bed - Rebecca Colon is working on this transition; -   She has discharge orders for INR daily until INR 2-3  Repeat Chem 8 8n 3 days - results to Dr. Jenae Parsons office - Follow up nephrology - listed as 1 week - will confer and let Celeste know -  Plan:  for nurse Celeste - request call   Verify discharge med changes   INR daily - CMP in 3 days - to Dr. Mathis December   Follow up Dr. Mathis December - when - (check with provider)   Bariatric equipment and facilitation needed -    Wound care - left lower leg wound - every other day - directions on AVS -      Left posterior thigh wound q 12 hours - directions on AVS.     Providers: nephrology - Dr. Mic Lemos - Dr. George Chavez          PCP - Dr. Lux Saavedra  _______________________________________________________________________________________  This patient was received as a referral from inpatient admission   Summary of patients top three problems:     Problem 1: Acute on Chronic diastolic HF     Problem 2: Morbid Obesity - BMI 54.24/ weight 367 lbs     Problem 3: CKD - stage 3     Pt also has leg ulcer - wound care, immobility issues d/t weight; medication non compliance d/t weight -; sleep apnea    Patient's challenges to self management identified:  Transportation; medication compliance - diuretic compliance - trouble with getting up to urinate and accommodations for toileting; Leslie Adame Difficulty in transportation - with weight issues and oxygen therapy - Pt needs bariatric equipment and bed -   Medication Management: Ms. Yetta Angelucci did not take her diuretic b/c she could not get to the toilet - d/t her weight and immobility - so she held diuretic therapy - causing weight gain and fluid retention.  Pt needs appropriate equipment -   New meds - Coreg 12.5 mg bid, Imdur 30 mg every day, Nystatin powder, Miralax 17 grams every day, Spironolactone 25 mg every day -   Lasix has changed to 80 mg bid - Continue Klor Con 20 meq bid, Coumadin 2.5 4 days a week - Mon, Wed, Thurs, Sat - 5 mg Tues, Fri, Sun   Stop - Metoprolol - replaced by Coreg -   _______________________________________________________________________________________  Advance Care Planning:   Patient was offered the opportunity to discuss advance care planning:  no     Does patient have an Advance Directive:  no   If no, did you provide information on Advance Care Planning? No/pending   [employer], Referrals, and Durable Medical Equipment: DC to SNF - [place]    Follow up appointments:  Pending discharge SNF - under MD santos at [facility].   Kenny Tinsley RN , [address], [facility]   [facility]  [phone]

## 2017-10-18 ENCOUNTER — PATIENT OUTREACH (OUTPATIENT)
Dept: CASE MANAGEMENT | Age: 77
End: 2017-10-18

## 2017-10-18 ENCOUNTER — TELEPHONE (OUTPATIENT)
Dept: RESPIRATORY THERAPY | Age: 77
End: 2017-10-18

## 2017-10-18 NOTE — PROGRESS NOTES
Community Care Team Documentation for Patient in [facility]  Initial Follow Up       Patient was admitted to [facility] from 10/6 to 10/16. Patient was discharged to [facility] on 10/16 (date). See previous [place] Care Team documentation under Patient Outreach. Hospital Discharge diagnosis:  Acute on chronic diastolic CHF    RRAT score: 30    Advance Medical Directive on file in EMR? Not on File    Total Hospitalizations/ED visits last 6 months? IP - 1; ED - 1    PCP : Cat Yin MD    Per Marina Ash at [facility], Reviewed [place] Back questions with SNF to ensure patient arrived with admission packet in order. SNF working on scheduling 1 week FU with nephrology. Open to PT/PT. Currently mod to max assist with bed mobility. Dependent with transfers, toileting, lower body bathing and dressing. Max assist for upper body bathing and dressing. Unable to stand. PTA pt was sedentary. SNF Attending:  Dina Martinez MD    Medications were not reconciled and general patient assessment was not completed during this skilled nursing facility outreach.      ODESSA Gay

## 2017-10-25 ENCOUNTER — PATIENT OUTREACH (OUTPATIENT)
Dept: CASE MANAGEMENT | Age: 77
End: 2017-10-25

## 2017-10-25 NOTE — PROGRESS NOTES
Community Care Team Documentation for Patient in [facility]  Subsequent Follow up     Patient remains at [facility] ([facility]). See previous [facility] Team notes. PCP : Merna Zaldivar MD    Per Dejan Corea at [facility], PT/OT continue. Currently supervision to min assist with bed mobility. Supervision with sliding board transfers. Using WC for mobility. Set up for upper body bathing and dressing. Max assist for lower body bathing and dressing. SLP to eval pt due to report of having difficulty swallowing. Nephrology FU scheduled for 10/31. Therapy anticipating 4 week LOS. Goal is to return home with . Barrier: Outgrown power WC and BSC. PT aware bariatric equipment will need to be purchased out of pocket. Medications were not reconciled and general patient assessment was not completed during this skilled nursing facility outreach.      Kaaren Castleman, BSW

## 2017-10-26 ENCOUNTER — TELEPHONE (OUTPATIENT)
Dept: RESPIRATORY THERAPY | Age: 77
End: 2017-10-26

## 2017-11-01 ENCOUNTER — PATIENT OUTREACH (OUTPATIENT)
Dept: CASE MANAGEMENT | Age: 77
End: 2017-11-01

## 2017-11-01 NOTE — PROGRESS NOTES
Community Care Team Documentation for Patient in [facility]  Subsequent Follow up     Patient remains at [facility] ([facility]). See previous [facility] Team notes. PCP : Yael Coates MD    Per Kevin Ochoa at [facility], Supervision with bed mobility. Able to stand for 20-60 seconds. Set up needed for upper body dressing. Mod assist with lower body ADLs. Wheezing reported. Breathing treatments scheduled. On O2. Plan to d/c home with . LOS: TBD. Medications were not reconciled and general patient assessment was not completed during this skilled nursing facility outreach.      ODESSA Laguna

## 2017-11-08 ENCOUNTER — PATIENT OUTREACH (OUTPATIENT)
Dept: CASE MANAGEMENT | Age: 77
End: 2017-11-08

## 2017-11-09 NOTE — PROGRESS NOTES
Community Care Team Documentation for Patient in [facility]  Subsequent Follow up     Patient remains at [address] ([facility]). See previous [facility] Team notes. PCP : Terence Magaña MD    PCP follow up appointment:  SNF to schedule    Per Avery Jerez at [facility], Patient is doing well and won appeal with insurance. Therapy has been extended. PT/OT continues. Supervision for bed mobility. Stand by assist for transfers. Ambulating 23 ft with front wheel walker with wheelchair following. Barrier is knee osteoarthritis. Patient is independent with upper body ADLs, ksupervision for lower body ADLs, supervision for transfers. k patient on 2 LPM O2. Weight stable at 350 lbs. Coumadin being monitor via INR. Patient will need hospital bed and shower chair at home. Plan for DC home with  in about 2 weeks. Medications were not reconciled and general patient assessment was not completed during this skilled nursing facility outreach.      Aurelio Pickett, MSN, RN, ACNS-BC, [facility]  Nurse Navigator, [employer] [phone]

## 2017-11-14 ENCOUNTER — TELEPHONE (OUTPATIENT)
Dept: RESPIRATORY THERAPY | Age: 77
End: 2017-11-14

## 2017-11-29 ENCOUNTER — PATIENT OUTREACH (OUTPATIENT)
Dept: CASE MANAGEMENT | Age: 77
End: 2017-11-29

## 2017-11-29 NOTE — PROGRESS NOTES
Community Care Team Documentation for Patient in [facility]  Discharge Note    Per SNF staff, patient discharged from [address] ([facility]). See previous [facility] Team notes. PCP : Roly Degroot MD    Per Angeline Zayas at [facility],  Patient was DC 11/28 to home with  and [place] [id number] (patient choice). Community Care Team will sign off at this time. Medications were not reconciled and general patient assessment was not completed during this skilled nursing facility outreach.      Lucrecia Freeman, MSN, RN, ACNS-BC, [facility]  Nurse Navigator, [employer] [phone]

## 2017-12-15 ENCOUNTER — TELEPHONE (OUTPATIENT)
Dept: RESPIRATORY THERAPY | Age: 77
End: 2017-12-15

## 2017-12-18 ENCOUNTER — APPOINTMENT (OUTPATIENT)
Dept: GENERAL RADIOLOGY | Age: 77
End: 2017-12-18
Attending: STUDENT IN AN ORGANIZED HEALTH CARE EDUCATION/TRAINING PROGRAM
Payer: MEDICARE

## 2017-12-18 ENCOUNTER — HOSPITAL ENCOUNTER (EMERGENCY)
Age: 77
Discharge: HOME | End: 2017-12-18
Attending: STUDENT IN AN ORGANIZED HEALTH CARE EDUCATION/TRAINING PROGRAM
Payer: MEDICARE

## 2017-12-18 VITALS
OXYGEN SATURATION: 93 % | DIASTOLIC BLOOD PRESSURE: 79 MMHG | HEIGHT: 70 IN | TEMPERATURE: 98.4 F | HEART RATE: 104 BPM | RESPIRATION RATE: 17 BRPM | WEIGHT: 293 LBS | SYSTOLIC BLOOD PRESSURE: 131 MMHG | BODY MASS INDEX: 41.95 KG/M2

## 2017-12-18 DIAGNOSIS — R07.9 CHEST PAIN, UNSPECIFIED TYPE: Primary | ICD-10-CM

## 2017-12-18 LAB
ALBUMIN SERPL-MCNC: 3.6 G/DL (ref 3.5–5)
ALBUMIN/GLOB SERPL: 0.9 {RATIO} (ref 1.1–2.2)
ALP SERPL-CCNC: 136 U/L (ref 45–117)
ALT SERPL-CCNC: 23 U/L (ref 12–78)
ANION GAP SERPL CALC-SCNC: 5 MMOL/L (ref 5–15)
AST SERPL-CCNC: 19 U/L (ref 15–37)
BASOPHILS # BLD: 0 K/UL (ref 0–0.1)
BASOPHILS NFR BLD: 0 % (ref 0–1)
BILIRUB SERPL-MCNC: 0.5 MG/DL (ref 0.2–1)
BNP SERPL-MCNC: 2486 PG/ML (ref 0–450)
BUN SERPL-MCNC: 36 MG/DL (ref 6–20)
BUN/CREAT SERPL: 19 (ref 12–20)
CALCIUM SERPL-MCNC: 8.9 MG/DL (ref 8.5–10.1)
CHLORIDE SERPL-SCNC: 97 MMOL/L (ref 97–108)
CK SERPL-CCNC: 24 U/L (ref 26–192)
CO2 SERPL-SCNC: 33 MMOL/L (ref 21–32)
CREAT SERPL-MCNC: 1.94 MG/DL (ref 0.55–1.02)
EOSINOPHIL # BLD: 0.2 K/UL (ref 0–0.4)
EOSINOPHIL NFR BLD: 2 % (ref 0–7)
ERYTHROCYTE [DISTWIDTH] IN BLOOD BY AUTOMATED COUNT: 17.3 % (ref 11.5–14.5)
GLOBULIN SER CALC-MCNC: 4.2 G/DL (ref 2–4)
GLUCOSE SERPL-MCNC: 107 MG/DL (ref 65–100)
HCT VFR BLD AUTO: 45.2 % (ref 35–47)
HGB BLD-MCNC: 14.2 G/DL (ref 11.5–16)
INR PPP: 1.7 (ref 0.9–1.1)
LYMPHOCYTES # BLD: 1.1 K/UL (ref 0.8–3.5)
LYMPHOCYTES NFR BLD: 10 % (ref 12–49)
MCH RBC QN AUTO: 29.8 PG (ref 26–34)
MCHC RBC AUTO-ENTMCNC: 31.4 G/DL (ref 30–36.5)
MCV RBC AUTO: 94.8 FL (ref 80–99)
MONOCYTES # BLD: 1 K/UL (ref 0–1)
MONOCYTES NFR BLD: 9 % (ref 5–13)
NEUTS SEG # BLD: 8.8 K/UL (ref 1.8–8)
NEUTS SEG NFR BLD: 79 % (ref 32–75)
PLATELET # BLD AUTO: 257 K/UL (ref 150–400)
POTASSIUM SERPL-SCNC: 4.1 MMOL/L (ref 3.5–5.1)
PROT SERPL-MCNC: 7.8 G/DL (ref 6.4–8.2)
PROTHROMBIN TIME: 16.9 SEC (ref 9–11.1)
RBC # BLD AUTO: 4.77 M/UL (ref 3.8–5.2)
SODIUM SERPL-SCNC: 135 MMOL/L (ref 136–145)
TROPONIN I SERPL-MCNC: <0.04 NG/ML
WBC # BLD AUTO: 11.1 K/UL (ref 3.6–11)

## 2017-12-18 PROCEDURE — 96374 THER/PROPH/DIAG INJ IV PUSH: CPT

## 2017-12-18 PROCEDURE — 80053 COMPREHEN METABOLIC PANEL: CPT | Performed by: STUDENT IN AN ORGANIZED HEALTH CARE EDUCATION/TRAINING PROGRAM

## 2017-12-18 PROCEDURE — 96375 TX/PRO/DX INJ NEW DRUG ADDON: CPT

## 2017-12-18 PROCEDURE — 93005 ELECTROCARDIOGRAM TRACING: CPT

## 2017-12-18 PROCEDURE — 85025 COMPLETE CBC W/AUTO DIFF WBC: CPT | Performed by: STUDENT IN AN ORGANIZED HEALTH CARE EDUCATION/TRAINING PROGRAM

## 2017-12-18 PROCEDURE — 85610 PROTHROMBIN TIME: CPT | Performed by: STUDENT IN AN ORGANIZED HEALTH CARE EDUCATION/TRAINING PROGRAM

## 2017-12-18 PROCEDURE — 83880 ASSAY OF NATRIURETIC PEPTIDE: CPT | Performed by: STUDENT IN AN ORGANIZED HEALTH CARE EDUCATION/TRAINING PROGRAM

## 2017-12-18 PROCEDURE — 36415 COLL VENOUS BLD VENIPUNCTURE: CPT | Performed by: STUDENT IN AN ORGANIZED HEALTH CARE EDUCATION/TRAINING PROGRAM

## 2017-12-18 PROCEDURE — 99284 EMERGENCY DEPT VISIT MOD MDM: CPT

## 2017-12-18 PROCEDURE — 84484 ASSAY OF TROPONIN QUANT: CPT | Performed by: STUDENT IN AN ORGANIZED HEALTH CARE EDUCATION/TRAINING PROGRAM

## 2017-12-18 PROCEDURE — 74011250636 HC RX REV CODE- 250/636: Performed by: STUDENT IN AN ORGANIZED HEALTH CARE EDUCATION/TRAINING PROGRAM

## 2017-12-18 PROCEDURE — 71010 XR CHEST PORT: CPT

## 2017-12-18 PROCEDURE — 82550 ASSAY OF CK (CPK): CPT | Performed by: STUDENT IN AN ORGANIZED HEALTH CARE EDUCATION/TRAINING PROGRAM

## 2017-12-18 RX ORDER — HYDROCODONE BITARTRATE AND ACETAMINOPHEN 5; 325 MG/1; MG/1
1 TABLET ORAL
Qty: 8 TAB | Refills: 0 | Status: SHIPPED | OUTPATIENT
Start: 2017-12-18 | End: 2019-11-15

## 2017-12-18 RX ORDER — HYDROMORPHONE HYDROCHLORIDE 2 MG/ML
1 INJECTION, SOLUTION INTRAMUSCULAR; INTRAVENOUS; SUBCUTANEOUS ONCE
Status: COMPLETED | OUTPATIENT
Start: 2017-12-18 | End: 2017-12-18

## 2017-12-18 RX ORDER — MORPHINE SULFATE 2 MG/ML
4 INJECTION, SOLUTION INTRAMUSCULAR; INTRAVENOUS ONCE
Status: COMPLETED | OUTPATIENT
Start: 2017-12-18 | End: 2017-12-18

## 2017-12-18 RX ADMIN — HYDROMORPHONE HYDROCHLORIDE 1 MG: 2 INJECTION INTRAMUSCULAR; INTRAVENOUS; SUBCUTANEOUS at 15:31

## 2017-12-18 RX ADMIN — MORPHINE SULFATE 4 MG: 2 INJECTION, SOLUTION INTRAMUSCULAR; INTRAVENOUS at 14:34

## 2017-12-18 NOTE — ED PROVIDER NOTES
HPI Comments: 69 y.o. female with extensive past medical history, please see list, significant for morbid obesity, HTN, A-fib (on warfarin), CHF, COPD, CKD, cardiomegaly, GERD, left breast CA s/p mastectomy who presents from home via private vehicle with chief complaint of Chest Pain. Pt reports that she woke up at 0830 to take her medications and felt as if a pill got stuck in her throat. She drank some more water and a ate a piece of toast and that sensation turned into a larger chest pain that has persisted for the last 5.5 hours. Current severity is a 5/10. Pt notes associated SOB but denies that the pain is pleuritic. Pt denies that she has missed any medications. She was admitted 2 months ago for 10 days for pleural effusion and was discharged to a rehab facility, home a few weeks ago. Pt states that the CP she currently has was not present with that previous presentation. She denies fever and cough. There are no other acute medical concerns at this time. Social hx: Never smoker. PCP: Ofelia Rosales MD  Cardiology: Avni Guerra MD     Note written by Rei Woodard, as dictated by Margarita Ornelas MD 2:06 PM      The history is provided by the patient and the spouse. No  was used.         Past Medical History:   Diagnosis Date    Arrhythmia     A Fib    Arthritis     Atrial fibrillation (HCC)     Cancer (HCC)     left breast cancer    Cardiomegaly     CKD (chronic kidney disease) stage 3, GFR 30-59 ml/min     judith    Clostridium difficile infection     Congestive heart failure, unspecified     COPD     Diarrhea     /constipation    Dyspepsia and other specified disorders of function of stomach     Elevated antinuclear antibody (MARYAM) level     Endocrine disease     hypothyroidism    GERD (gastroesophageal reflux disease)     Hearing loss     Heart failure (HCC)     Hypertension     Hypertensive heart disease with heart failure (HCC)     diastolic dysfunction    Joint pain     Leg swelling     Long term (current) use of anticoagulants     Lymphedema     Morbid obesity (HCC)     Obesity, unspecified     Other ill-defined conditions(799.89)     lymph edema    Pleural effusion     s/p VATS    Shortness of breath     Thyroid disease     Unspecified sleep apnea     Does not use machine       Past Surgical History:   Procedure Laterality Date    BREAST SURGERY PROCEDURE UNLISTED  13    LEFT BREAST MASTECTOMY SENTINEL NODE BIOPSY      HX BREAST BIOPSY      left    HX BREAST BIOPSY      fibroids removed right breast    HX  SECTION      times 2    HX KNEE ARTHROSCOPY      left knee     HX MODIFIED RADICAL MASTECTOMY  2013    BREAST MASTECTOMY MODIFIED RADICAL performed [facility] MAIN OR    HX OTHER SURGICAL      pleural effusion    HX RHINOPLASTY      HX DOMI AND BSO      HX TONSILLECTOMY           Family History:   Problem Relation Age of Onset    Lung Disease Mother      Emphysema    Heart Disease Mother     Hypertension Mother     Cancer Father      Lung Cancer    Heart Disease Father     Hypertension Father     Cancer Paternal Aunt      colon       Social History     Social History    Marital status:      Spouse name: N/A    Number of children: N/A    Years of education: N/A     Occupational History    Not on file. Social History Main Topics    Smoking status: Never Smoker    Smokeless tobacco: Never Used    Alcohol use No    Drug use: No    Sexual activity: Not on file     Other Topics Concern    Not on file     Social History Narrative         ALLERGIES: Latex; Grass pollen-bermuda, standard; and Sulfa (sulfonamide antibiotics)    Review of Systems   Constitutional: Negative for chills and fever. HENT: Negative for sore throat. Respiratory: Positive for shortness of breath. Negative for cough. Cardiovascular: Positive for chest pain.    Gastrointestinal: Negative for abdominal pain and vomiting. Genitourinary: Negative for dysuria. Musculoskeletal: Negative for back pain. Skin: Negative for rash. Neurological: Negative for syncope and headaches. Psychiatric/Behavioral: Negative for confusion. All other systems reviewed and are negative. Vitals:    12/18/17 1242   BP: 118/73   Pulse: (!) 101   Resp: 22   Temp: 98 °F (36.7 °C)   SpO2: 97%   Weight: 144.2 kg (318 lb)   Height: 5' 9.5\" (1.765 m)            Physical Exam   Constitutional: She is oriented to person, place, and time. She appears well-developed. No distress. Morbidly obese. HENT:   Head: Normocephalic and atraumatic. Eyes: Conjunctivae and EOM are normal. Pupils are equal, round, and reactive to light. Neck: Normal range of motion. Neck supple. Cardiovascular: Normal rate and normal heart sounds. An irregularly irregular rhythm present. No murmur heard. Pulmonary/Chest: Effort normal and breath sounds normal. No respiratory distress. Abdominal: Soft. Bowel sounds are normal. She exhibits no distension. There is no tenderness. There is no rebound. Musculoskeletal: Normal range of motion. She exhibits no edema. 4+ edema bilateral LE. Neurological: She is alert and oriented to person, place, and time. No cranial nerve deficit. She exhibits normal muscle tone. Coordination normal.   Skin: Skin is warm and dry. No rash noted. Psychiatric: She has a normal mood and affect. Her behavior is normal.   Nursing note and vitals reviewed. Note written by Rei Roberts, as dictated by Taina Rogers MD 2:10 PM       [facility]  ED Course       Procedures    ED EKG interpretation (12:55 PM):  Rhythm: atrial fib with occasional PVC. Rate (approx.): 100; Axis: normal; ST/T wave: No STEMI. Note written by Rei Roberts, as dictated by Taina Rogers MD 1:50 PM    The patient is resting comfortably and feels better, is alert and in no distress.  The repeat examination is unremarkable and benign. The electrocardiogram shows no signs of acute ischemia and the history, exam, diagnostic testing and current condition do not suggest that this patient is having an acute myocardial infarction, significant arrhythmia, unstable angina (HEART score = 3), esophageal perforation, pulmonary embolism, aortic dissection, pneumothorax, severe pneumonia, sepsis or other significant pathology that would warrant further testing, continued ED treatment, admission, or cardiology or other specialist consultation at this point. The vital signs have been stable. The patient's condition is stable and appropriate for discharge. The patient will pursue further outpatient evaluation with the primary care physician, other designated physician or cardiologist. The patient and/or caregivers have expressed a clear and thorough understanding and agree to follow up as instructed.

## 2017-12-18 NOTE — ED TRIAGE NOTES
Triage:  Pt to ED due to upper bilateral chest pain that began around 0830 today. Pt also mentions increased SOB, and noted more pain with deep inspiratory effort. Pt denies any cough, Pt has paralyzed vocal cords so has a \"froggy voice at baseline\". Pt denies any N/V/D, Pt has a history of fluid retention and has noted weight gain approx 3 lbs recently.

## 2017-12-18 NOTE — DISCHARGE INSTRUCTIONS
Chest Pain: Care Instructions  Your Care Instructions    There are many things that can cause chest pain. Some are not serious and will get better on their own in a few days. But some kinds of chest pain need more testing and treatment. Your doctor may have recommended a follow-up visit in the next 8 to 12 hours. If you are not getting better, you may need more tests or treatment. Even though your doctor has released you, you still need to watch for any problems. The doctor carefully checked you, but sometimes problems can develop later. If you have new symptoms or if your symptoms do not get better, get medical care right away. If you have worse or different chest pain or pressure that lasts more than 5 minutes or you passed out (lost consciousness), call 911 or seek other emergency help right away. A medical visit is only one step in your treatment. Even if you feel better, you still need to do what your doctor recommends, such as going to all suggested follow-up appointments and taking medicines exactly as directed. This will help you recover and help prevent future problems. How can you care for yourself at home? · Rest until you feel better. · Take your medicine exactly as prescribed. Call your doctor if you think you are having a problem with your medicine. · Do not drive after taking a prescription pain medicine. When should you call for help? Call 911 if:  ? · You passed out (lost consciousness). ? · You have severe difficulty breathing. ? · You have symptoms of a heart attack. These may include:  ¨ Chest pain or pressure, or a strange feeling in your chest.  ¨ Sweating. ¨ Shortness of breath. ¨ Nausea or vomiting. ¨ Pain, pressure, or a strange feeling in your back, neck, jaw, or upper belly or in one or both shoulders or arms. ¨ Lightheadedness or sudden weakness. ¨ A fast or irregular heartbeat.   After you call 911, the  may tell you to chew 1 adult-strength or 2 to 4 low-dose aspirin. Wait for an ambulance. Do not try to drive yourself. ?Call your doctor today if:  ? · You have any trouble breathing. ? · Your chest pain gets worse. ? · You are dizzy or lightheaded, or you feel like you may faint. ? · You are not getting better as expected. ? · You are having new or different chest pain. Where can you learn more? Go to http://ke-tawanna.info/. Enter A120 in the search box to learn more about \"Chest Pain: Care Instructions. \"  Current as of: March 20, 2017  Content Version: 11.4  © 5466-8068 [employer]. Care instructions adapted under license by [employer] (which disclaims liability or warranty for this information). If you have questions about a medical condition or this instruction, always ask your healthcare professional. [address] any warranty or liability for your use of this information.

## 2017-12-18 NOTE — ED NOTES
Patient has received discharge instructions from ER MD, verbalizes understanding.   Discharged via wheelchair with male

## 2017-12-19 LAB
ATRIAL RATE: 89 BPM
CALCULATED R AXIS, ECG10: -178 DEGREES
CALCULATED T AXIS, ECG11: 77 DEGREES
DIAGNOSIS, 93000: NORMAL
Q-T INTERVAL, ECG07: 322 MS
QRS DURATION, ECG06: 86 MS
QTC CALCULATION (BEZET), ECG08: 415 MS
VENTRICULAR RATE, ECG03: 100 BPM

## 2018-03-30 ENCOUNTER — HOSPITAL ENCOUNTER (OUTPATIENT)
Dept: PHYSICAL THERAPY | Age: 78
Discharge: HOME | End: 2018-03-30
Payer: MEDICARE

## 2018-03-30 VITALS — WEIGHT: 293 LBS | BODY MASS INDEX: 44.41 KG/M2 | HEIGHT: 68 IN

## 2018-03-30 DIAGNOSIS — I89.0 LYMPHEDEMA: ICD-10-CM

## 2018-03-30 PROCEDURE — 97162 PT EVAL MOD COMPLEX 30 MIN: CPT

## 2018-03-30 PROCEDURE — 97110 THERAPEUTIC EXERCISES: CPT

## 2018-03-30 PROCEDURE — G8990 OTHER PT/OT CURRENT STATUS: HCPCS

## 2018-03-30 PROCEDURE — 97140 MANUAL THERAPY 1/> REGIONS: CPT

## 2018-03-30 PROCEDURE — G8991 OTHER PT/OT GOAL STATUS: HCPCS

## 2018-03-30 NOTE — PROGRESS NOTES
[address] [place] [address]  Physical Therapy Lymphedema Clinic  [address] [facility], [address] [address].    OUTPATIENT physical Therapy Evaluation with CMS G codes    NAME: Franco Bender AGE: 69 y.o. GENDER: female  DATE: 3/30/2018  REFERRING PHYSICIAN: Dr. Gloria Brandon:   Primary Diagnosis:  · Primary lymphedema/lipolymphedema (Q82.0)  Other Treatment Diagnoses:  · Abnormality of gait (R26.9)  · Swelling not relieved by elevation (R60.9)  · Malignant neoplasm of breast with lumpectomy or mastectomy (C50.919)  · Morbid obesity (E66.01)  · (CVI) Compression of vein (I87.1)  · Chronic kidney disease, stage 3 (N18.3)  · Congestive heart failure (I50.20)  · Thyroid disease (E07.89)  Date of Onset: 11/12/2015  Present Symptoms and Functional Limitations: Patient presents today with a long standing history of B LE swelling since her 20's. She attended our lymphedema clinic in 2010 for intensive therapy. She was hospitalized in 2012 after a fall and since that time her mobility has significantly decreased. She no longer drives or even ambulates more that a few feet at a time. She is confined to an electric scooter. She was last seen for just an evaluation in our clinic in September 2016. She was not able to return for treatment as her  was having surgery and would not be able to be her caregiver. She most recently was seen by [facility] PT and the therapist was able to don B knee high multilayer compression bandages. The  did not assume any of the care for these. She was measured for a velcro compression system for her feet and lower legs and they ordered 4 sets. They are now awaiting delivery of the lower leg pieces as the foot pieces did arrive. Lymphedema Life Impact Scale: Score of 38 and impairment percentage of 56%. See scanned document.    Past Medical History:   Past Medical History:   Diagnosis Date    Arrhythmia     A Fib    Arthritis     Atrial fibrillation (HCC)     Cancer (HCC)     left breast cancer    Cardiomegaly     CKD (chronic kidney disease) stage 3, GFR 30-59 ml/min     judith    Clostridium difficile infection     Congestive heart failure, unspecified     COPD     Diarrhea     /constipation    Dyspepsia and other specified disorders of function of stomach     Elevated antinuclear antibody (MARYAM) level     Endocrine disease     hypothyroidism    GERD (gastroesophageal reflux disease)     Hearing loss     Heart failure (HCC)     Hypertension     Hypertensive heart disease with heart failure (HCC)     diastolic dysfunction    Joint pain     Leg swelling     Long term (current) use of anticoagulants     Lymphedema     Morbid obesity (HCC)     Obesity, unspecified     Other ill-defined conditions(799.89)     lymph edema    Pleural effusion     s/p VATS    Shortness of breath     Thyroid disease     Unspecified sleep apnea     Does not use machine     Past Surgical History:   Procedure Laterality Date    BREAST SURGERY PROCEDURE UNLISTED  13    LEFT BREAST MASTECTOMY SENTINEL NODE BIOPSY      HX BREAST BIOPSY      left    HX BREAST BIOPSY      fibroids removed right breast    HX  SECTION      times 2    HX KNEE ARTHROSCOPY      left knee     HX MODIFIED RADICAL MASTECTOMY  2013    BREAST MASTECTOMY MODIFIED RADICAL performed by David Saini MD at [facility] MAIN OR    HX OTHER SURGICAL      pleural effusion    HX RHINOPLASTY      HX DOMI AND BSO      HX TONSILLECTOMY       Current Medications:    Current Outpatient Prescriptions   Medication Sig    HYDROcodone-acetaminophen (NORCO) 5-325 mg per tablet Take 1 Tab by mouth every four (4) hours as needed for Pain. Max Daily Amount: 6 Tabs.  furosemide (LASIX) 80 mg tablet Take 1 Tab by mouth two (2) times a day.     carvedilol (COREG) 12.5 mg tablet Take 1 Tab by mouth two (2) times daily (with meals).  isosorbide mononitrate ER (IMDUR) 30 mg tablet Take 1 Tab by mouth daily.  nystatin (MYCOSTATIN) powder Apply  to affected area three (3) times daily. APPLY TO bilateral groins and under both breasts  Indications: CUTANEOUS CANDIDIASIS    polyethylene glycol (MIRALAX) 17 gram packet Take 1 Packet by mouth daily.  spironolactone (ALDACTONE) 25 mg tablet Take 1 Tab by mouth daily.  warfarin (COUMADIN) 2.5 mg tablet Take 2.5 mg by mouth four (4) days a week. (2.5 mg on Hen-Yzu-Vboyw-Sat; 5 mg on Tues-Fri-Sun)    warfarin (COUMADIN) 5 mg tablet Take 5 mg by mouth three (3) days a week. (2.5 mg on Wxm-Ura-Qqhdy-Sat; 5 mg on Tues-Fri-Sun)    guaiFENesin ER (MUCINEX) 600 mg ER tablet Take 600 mg by mouth two (2) times a day.  docusate sodium (COLACE) 50 mg capsule Take 50 mg by mouth two (2) times daily as needed for Constipation.  ergocalciferol (ERGOCALCIFEROL) 50,000 unit capsule Take 50,000 Units by mouth.  levothyroxine (SYNTHROID) 112 mcg tablet Take 1 Tab by mouth Daily (before breakfast).  allopurinol (ZYLOPRIM) 100 mg tablet Take 100 mg by mouth daily.  POTASSIUM CHLORIDE (KLOR-CON PO) Take 20 mEq by mouth two (2) times a day.  FERROUS SULFATE, DRIED (IRON, DRIED, PO) Take 65 mg by mouth two (2) times a day.  LACTOBACILLUS RHAMNOSUS GG (PROBIOTIC PO) Take  by mouth. 15 billion     venlafaxine (EFFEXOR) 75 mg tablet Take 75 mg by mouth two (2) times a day.  loratadine (CLARITIN) 10 mg tablet Take 10 mg by mouth daily.  gabapentin (NEURONTIN) 400 mg capsule Take 400 mg by mouth nightly.  pantoprazole (PROTONIX) 40 mg tablet Take 40 mg by mouth daily. No current facility-administered medications for this encounter. Allergies:    Allergies   Allergen Reactions    Latex Rash    Grass Pollen-[place], Standard Unknown (comments)    Sulfa (Sulfonamide Antibiotics) Unknown (comments)      Social/Work History and Prior Level of Function and/or comorbidities impacting plan of care :  Pt is  and retired. She relies on the electric scooter for mobility. She does not sleep in a bed but does spend time in the power recliner chair. She sits a lot during the day spending time on facebook and needlework. She walks a few steps to the [facility]. She is incontinent of urine and reports that she soaked her R bandage with urine this morning. Her  is present today and understands his role as caregiver. Pt with extensive list of co-morbidities. See above medical history above. Living Situation: , very sedentary lifestyle      Trainable Caregiver?:  and he is present today. Self-care/ADLs: Pt unable to care for lower legs and feet. She cannot get to the shower so relies on sponge bathing with wipes. Pt unable to fit into any shoes. Mobility: very limited. Transfers from the recliner chair to the [facility]. Walks very shorts distances with wheeled walker. Sleeping Arrangement:  In the recliner chair. States she has sleep apnea but does not have a CPAP. Adaptive Equipment Owned: electric W/C, walker, BSC, ramp, shower bench   Other: fall 3 months ago when she did not sit fully onto her electric scooter. Previous Therapy:  [facility] lymphedema clinic in 2010 for phase one CDT and again in 9/16 for just the evaluation  Compression/Lymphedema Equipment:  Custom flat knit knee high stockings in the past, MLB supplies, and L below knee arabella garment    SUBJECTIVE:   Pt states that she really needs new compression DME. She can no longer wear shoes because her feet are so large. She is awaiting compreflex lite lower leg pieces in XXL, long and is bringing in the medium foot pieces today. They ordered 4 sets of each so that there will be one to wear and one to wash for each LE. Patients goals for therapy: \"to walk again and control the swelling in my legs. \"    EVALUATION AND OBJECTIVE DATA SUMMARY:   Pain:  Pain Scale 1: Numeric (0 - 10)     Pain Intensity 1: 2     Pain Location 1: Knee     Pain Orientation 1: Left     Patient Stated Pain Goal: 0       Self-Care and ADLs:  Grooming: mod I Bathing: sponge baths primarily with wipes   UB Dressing: mod I LB Dressing: min assistance   Don/Doff Shoes/Socks: pt unable to find socks or shoes that will fit her feet so she is barefooted and just wearing slipper socks with tread Toileting: pt performs stand turn transfers from her recliner chair to the [facility] with mod I at home   Other: home health therapist has been seeing her 2 times a week to don MLB using the products she had from previous sessions in our clinic back in 2010    Skin and Tissue Assessment:  Dermal Status:  (x )  Intact ( x)  Dry-feet and lower legs especially   ( x)  Tenuous (x )  Flaky   ( )  Wound/lesion present ( )  Scars   (x )  Dermatitis    Texture/Consistency:  ( )  Boggy ( )  Pitting Edema   (x )  Brawny ( )  Combination   ( x)  Fibrotic/Woody    Pigmentation/Color Change:  ( )  Normal (x )  Hemosiderin   ( )  Red (x )  Erythematous-mild in the feet   ( )  Hyperpigmented ( )  Hyperlipodermatosclerosis   Anomalies:  ( )  Lymphorrhea ( )  Vesicles   ( )  Petechiae ( )  Warty Vercusis   ( )  Bullae ( )  Papilloma   Circulatory:  ( )  DORIS ( )  Varicosities:   ( )  Pulses ( )  Vascular studies ruled out DVT in past   ( )  DVT History    Nails:  ( x)  Normal  ( )  Fungus  Stemmers Sign: positive B dorsal feet  Height:  Height: 5' 8\" (172.7 cm)  Weight:  Weight: 141.8 kg (312 lb 9.6 oz)   BMI:  BMI (calculated): 47.5  (36 or greater: adversely affecting lymphedema)  Wound/Ulcer:  None currently                     Wound Pain:   n/a             Pictures:  R anterior leg:                                                       L anterior leg:                 Volumetric Measurements:   Right:  13,850 mL (a decrease of 1,389 ml since last measurement in the clinic on 9/7/2016) Left:  15,229 mL (a decrease of 2,485 ml since last measurement in the clinic on 9/7/2016)   % Difference: 10% Dominance: R   (See scanned graph)  Range of Motion: very limited toe and ankle motion, knee flexion limited to 80-90 degrees  Strength: WFL but her endurance is poor  Sensation:  Intact   Mobility:    Bed/Chair Mobility:  Mod assist times 1 for supine to sit and sit to supine Transfers: Mod [place] for transfers electric  scooter to the mat table and then back using stand turn transfers with hands on the scooter's armrest.   Sitting Balance:  good Standing Balance:  Good with UE supported on the bariatric wheeled walker   Gait:  Pt ambulated 10 feet with bariatric wheeled walker and contact guard of one with early fatigue Wheelchair Mobility:  I with motorized scooter   Endurance:  Poor for gait and stance activities Stairs:  Deferred, pt has a ramp at home       Safety:  Patient is alert and oriented:  yes   Safety awareness:  good   Fall Risk?:  yes   Patient given written fall prevention handout: Yes   Precautions:  Standard lymphedema precautions to include avoiding blood pressure readings, injections and IVs or other procedures/acts that could lead to broken skin on affected area, and avoiding excessive heat, resistive activity or altitude without compression garment  Based on the above components, the patient evaluation is determined to be of the following complexity level: MEDIUM  Evaluation Time: 11:35-12:45 pm    TREATMENT PROVIDED:   1. Treatment description:  Therapeutic Exercise and Procedure: Patient instructed in activity restrictions and exercise guidelines. Therapist demonstrated deep abdominal breathing and a remedial lymphedema range of motion exercise program to be done in sitting and supine postures. Patient was able to complete the routine times 5 reps and deep abdominal breathing with fair-poor performance. Patient has been asked to complete breathing exercises and the decongestive exercise routine daily.   Pt assisted with minimal assist to contact guard times 2  to ambulate with bariatric wheeled walker 6 feet to the scales and then 5 additional feet prior to fatigue. Pt required mod assist times 1 for tranfers chair to mat table using stand turn technique. Pt required assistance to lie supine for therapist to doff B below knee multi-layer (MLB) bandages and then wash her LEs with dove soap and apply lotion using MLD techniques. Treatment time:  12:45-1:30 pm  Minutes: 45    2. Treatment description:  Manual Therapy: Patient instructed in skin care principles and anatomy of the lymphatic system. Therapist able to demonstrate manual lymphatic drainage techniques for the drainage of B LEs and skin care protocol: washing, drying, applying appropriate skin care products. She brought in all of her old compression garments: custom flat knit knee high stockings, L BK arabella, bandaging supplies, and circaid velcro wrap with farrow foot piecesl. Pt asked to bring back the knee high stockings and the arabella as time did not permit to try these products on the patient today. Pt did bring in the new [employer] compreflex foot pieces that she just received and the one was tried on the R foot with good fit. The compreflex lite lower leg pieces are to be received in the home in the next few days but today pt was measured for such products and it appears that they will not be the appropriate fit as the upper straps may in deed not be able to be tighten enough (her girth is greatly below the range for this particular product). Her  was able to look online and it is possible to return the products but with a cost of a 15% restocking fee. This will be addressed more at the next appointment but not sure this will be the appropriate product for the pt in the long term.   Pt requiring MLB (knee high) to be placed on B LEs today and her  was involved in the process as he will need to know how to don and doff such in this program to have a successful home program.  Her bandaging supplies were used but she required additional foam padding which she used [employer] as the vendor. Pt and  educated in donning/doffing/precautions of the MLB. Pt was educated in the importance of obtaining a lace up or velcro shoe to offer support and compression to her feet. She will be provided of sources that maybe able to supply a large enough shoe to accommodate her foot. Treatment time:  1:30-2:15 pm  Minutes: 39    ASSESSMENT:   Ce Mckinney is a 69 y.o. female who presents with severe B LE primary lipolymphedema. Patient will benefit from complete decongestive therapy (CDT) including manual lymphatic drainage (MLD) technique, short-stretch textile bandages/compression system to decongest limb, and kinesiotaping as appropriate. Patient and her caregiver, , will receive instruction in proper skin care to recognize signs/symptoms of and prevent infection, therapeutic exercise, and self-MLD for independent home program and restorative lymphatic performance. This care is medically necessary due to the infection risk with lymphedema and to improve functional activities. CDT is necessary to resolve swelling to allow patient to return to wearing normal clothes/footwear and prevent worsening of symptoms, such as venous stasis ulcerations, infections, or hospitalizations. Patient and  will be independent with home program strategies to allow improved ADL ability and mobility and to allow patient to return to greatest functional independence. Rehabilitation potential is considered to be fair. Factors which may influence rehabilitation potential include limited mobility skills, complex medical history, and limited support at home.   Feel patient now has the support of her  which was lacking in the past.   Patient will benefit from 6-8 physical therapy visits over 12 weeks to optimize improvement in these areas if this can not be done in the home setting. PLAN OF CARE:   Recommendations and Planned Interventions:  Manual lymph drainage/complete decongestive therapy  Multi-layer compression bandaging (short-stretch)  Compression garment fitting/provision  Lymphedema therapeutic exercise  AROM/PROM/Strength/Coordination  Self-care training  Functional mobility training  Education in skin care and lymphedema precautions  Self-MLD education per home program  Self-bandaging education per home program  Caregiver education as needed  Wound care as needed     GOALS  Short term goals  Time frame: 4-  1. Patient and caregiver, , will demonstrate knowledge of signs/symptoms of infections/cellulitis and be independent in skin care to prevent cellulitis. 2.  Patient will demonstrate independence in lymphedema home program of therapeutic exercises to improve circulation and decongest limb to improve ADLs. 3.  Patient/ will be able to don a safe and effective knee high multi-layer bandages (MLB) in the home setting. 4.  Patient's current compression DME will be evaluated for appropriateness. 5.  Patient's new velcro compression DME will be evaluated for appropriateness once received by the patient. Long term goals  Time frame: 6-  1. Pt will show improvement in Lymphedema Life Impact Scale by decreasing impairment percentage to under 40% and thus allow improvement in patient's quality of life. 2.  Patient/caregiver will be independent with don/doff of compression system and use in order to prevent reaccumulation of fluid at discharge. 3.  Patient will purchase a pair of shoes that will fit and can be worn with her compression garments. 3.  Pt/caregiver will be independent in self-MLD and show stable limb volumes showing decongestion and pt. ready for transition to independent restorative phase of lymphedema therapy.      In compliance with CMSs Claims Based Outcome Reporting, the following G-code set was chosen for this patient based on their primary functional limitation being treated: The outcome measure chosen to determine the severity of the functional limitation was the Lymphedema Life Impact Scale with a score of 38 and a percentage of 56% which was correlated with the impairment scale. ? Other PT/OT Primary Functional Limitations:     - CURRENT STATUS: CK - 40%-59% impaired, limited or restricted    - GOAL STATUS:  CJ - 20%-39% impaired, limited or restricted    - D/C STATUS:  ---------------To be determined---------------     Patient has participated in goal setting and agrees to work toward plan of care. Patient was instructed to call if questions or concerns arise. Thank you for this referral.  Char Lal, PT   Time Calculation: 160 mins    TREATMENT PLAN EFFECTIVE DATES:   3/30/2018 TO 6/25/2018  I have read the above plan of care for Chikis Olmedo. I certify the above prescribed services are required by this patient and are medically necessary.   The above plan of care has been developed in conjunction with the lymphedema/physical therapist.       Physician Signature: ____________________________________Date:______________                                                 Dr. Preston Nayak

## 2018-04-03 ENCOUNTER — HOSPITAL ENCOUNTER (OUTPATIENT)
Dept: PHYSICAL THERAPY | Age: 78
Discharge: HOME | End: 2018-04-03
Payer: MEDICARE

## 2018-04-03 PROCEDURE — 97110 THERAPEUTIC EXERCISES: CPT

## 2018-04-03 PROCEDURE — 97140 MANUAL THERAPY 1/> REGIONS: CPT

## 2018-04-03 NOTE — PROGRESS NOTES
[address] [place] [address]  Physical Therapy [facility]  [address] [facility] [place], [address]    LYmphedema Therapy  Visit: 2    [x]                  Daily note               []                 30 day/10th visit progress note    NAME: Alla Montes  DATE: 4/3/2018  GOALS  Short term goals  Time frame: 4-  1. Patient and caregiver, , will demonstrate knowledge of signs/symptoms of infections/cellulitis and be independent in skin care to prevent cellulitis. Demonstration of skin care performed on LEs today and patient and  educated in the process. 2.  Patient will demonstrate independence in lymphedema home program of therapeutic exercises to improve circulation and decongest limb to improve ADLs. Pt and  education in the ROM remedial lymphedema exercise routine to date. 3.  Patient/ will be able to don a safe and effective knee high multi-layer bandages (MLB) in the home setting. Pt and  educated in don/doff/precautions of MLB and he has been asked to try to don a MLB at home when the R bandage is removed. 4.  Patient's current compression DME will be evaluated for appropriateness. Pt's L custom BK arabella garment was donned and is missing the power jacket but pt informed that her  could apply comprilans to provide the added pressure. Pt's custom old flat knit knee high stockings were donned on the R.  Pt able to doff the garment with donning gloves and dycem on her foot. The fit of the garment is no longer good but pt was able to demonstrate understanding of the donning process and was able to assist with doffing using a piece of dycem on the floor. Goal met 4-3-18. 5.  Patient's new velcro compression DME will be evaluated for appropriateness once received by the patient. Pt brought in the new compreflex lite lower leg pieces (4) and the foot pieces (4) and the garment was donned on the R  LE.   The fit around the foot and ankle was good but the garment is too big around mid calf superiorally. Pt will try and return 3 lower leg pieces for the size XL versus the XXL. Goal met 4-3-18.     Long term goals  Time frame: 6-  1. Pt will show improvement in Lymphedema Life Impact Scale by decreasing impairment percentage to under 40% and thus allow improvement in patient's quality of life. 2.  Patient/caregiver will be independent with don/doff of compression system and use in order to prevent reaccumulation of fluid at discharge.  was educated today in don/doff/precautions of a knee high MLB and also the compreflex lite foot and lower leg pieces. Pt and he will need to demonstrate independence. 3.  Patient will purchase a pair of shoes that will fit and can be worn with her compression garments. Pt encouraged to purchase a pair of shoes that will fit with the compreflex boot in place. 3.  Pt/caregiver will be independent in self-MLD and show stable limb volumes showing decongestion and pt. ready for transition to independent restorative phase of lymphedema therapy. Pt has a understanding of the self MLD sequence and has the written program to follow at home but states that she has not performed such at home since the evaluation was done last week. SUBJECTIVE REPORT:  Pt agreeable to working with MLB and this new biacare compreflex lite system for 6 months and then re-evaluating for custom knee high stockings at that time unless significant improvement is noted in the next month or so. Pain:  Pain Scale 1: Numeric (0 - 10)     Pain Intensity 1: 2     Pain Location 1: Knee     Pain Orientation 1: Left     Patient Stated Pain Goal: 0     Gait:    [x]  Modified Independent gait with bariatric wheeled walker in the clinic for up to 15 feet of travel. ADLs:  Pt will need assistance from her spouse for management of the compression DME.   Treatment Response:    [x]   Patient reviewed packet received at evaluation  []   Patient completed home program as prescribed  [x]   Patient not fully compliant with home program to date as she did not complete the ROM exercise routine, self MLD, try to increase gait, or change her posture from prolong sitting. [x]   Patient did bring in clean bandaging supplies to use today and all her compression DME from the past and just received. Function:   [x]   Patient will require assistance with completion of home program in the area of compression  []   ADLs are requiring less assistance   []   Patient able to return to work/leisure activities  Lymphedema Life Impact Scale: deferred today  Weight: deferred today. TREATMENT AND OBJECTIVE DATA SUMMARY:   Patient/Family Education:      Educated in skin care: [x]   Skin care products  [x]   Hygiene  [x]  Prevention of cellulitis  []   Wound care     Educated in exercise: [x]   Walking program  []   Maria Fernanda ball routine  []   Stick routine  [x]   ROM routine     Instructed in self MLD:   Written sequence given on last visit and reviewed with patient as well as demonstration and instruction during MLD portion of the session today. Instructed in don/doff of compression system:   [x]   Multi layer bandage (MLB) donning principles and wear precautions  [x]   Day garments-compreflex lite lower leg and foot pieces  [x]   Night garments-L below knee arabella garment with comprilans to serve as compression as she does not know where the power jacket for the arabella is located. Included washing and drying procedures for the arabella and compreflex products. Therapeutic Activity 0 minutes   Treatment time: n/a  Functional Mobility: Mod I with transfers electric chair to commode. Fall risk: Yes with limited gait and use of the wheeled walker for very short household distances. Therapeutic Exercise/Procedure 30 minutes   Treatment time: 9:10-9:40 am  Maria Fernanda ball exercise program: Deferred at this time.    Free exercises/ROM: Demonstration by therapist of written routine. Patient able to verbalize understanding of the seated exercises and the deep abd breathing but states that she did not try to perform any of the routine at home. She was instructed in the importance of daily compliance with every day or bid exercise sessions to promote movement of fluid from her LEs. Pt also instructed in deep abd breathing. Pt tolerated ROM of each LE for therapist to doff B knee high MLB while she was sitting in the chair. She has fatigue holding L knee in extension for this process. Home program: Patient to perform daily to BID:  [x]   Skin care  [x]   Deep abdominal breathing  [x]   Exercise routine  [x]   Walking program  [x]   Rest in supine   [x]   Compression bandage  [x]   Compression garments-on the R LE after the MLB is doffed in 5-6 days. []   Vasopneumatic device  []   Wound care  [x]   Self MLD  [x]   Bring supplies to each therapy visit  []   Purchase necessary supplies  [x]   Weight loss program  []   Follow up with       Rationale: Exercise will increase the lymph angiomotoricity and tissue pressure of the skin and thus decrease swelling. Modalities 0 minutes   Treatment time: n/a  Vasopneumatic pump: Deferred at this time as pt with too many cardiac, respiratory and kidney issues. Manual Lymphatic Drainage (MLD) 90 minutes   Treatment time: 9:40-11:10 am  Area to decongest:    [x] LE             [x]  Right     [x]  Left      [x] Trunk         Sequence used and effectiveness:     [x] Secondary/Primary sequence for lower extremities with / without trunk involvement     [x] Modifications were made to manual lymph drainage sequence to exclude cervical techniques secondary to patient's age    [x] Self MLD sequence/techniques/hand strokes. Pt was given the written handout with explanation and demonstration on previous visit.   She reports that she did not complete any of the components and that she does not have any questions regarding the specifics of the program.  Pt educated in the benefits of performing the program daily to BID and highly encouraged to perform such. Skin/wound care/debridement: Pt with dry skin primarily on her LEs. Pt with staining of her lower leg section. Her feet and lower legs were washed with dove soap and remedy repair lotion was used afterwards with application using MLD principles. Skin inspection occurred. Upper/Lower extremity compression: Patient's new products: Compreflex lite lower leg and foot pieces were evaluated and just one set was donned with fit assessed on the R LE. The upper two straps will be too loose as her girth in that area is too small for the sizing chart required for a XXL garment. This was shown to patient and her spouse. They will call the vendor and try and return the lower leg pieces for smaller size. Pt and  educated in don/doff/precautions/care/appropriateness of these garments.  to try and don the product just prior to patient returning to the clinic next week. He will also call the vendor where he purchased the products to see about exchanging for a smaller size. Patient's old custom flat knit knee high was donned on the R LE but the fit is poor and and stockings are very worn. Not an appropriate garment for patient at this time but a garment that could be feasible in the future as she should be able to don with the juzo slip aide, dycem, and donning gloves if the garment is the appropriate size. Discussed the need to concentrate on the MLB and her new biacare system that she has just purchased with help of home health therapist for now. Patient's custom arabella night garment for the L LE was then donned by patient and therapist with pt using her donning gloves and dycem on the floor. Measurements for the arabella are not available as she probably received the garment in 2010 but the fit appears to be good with only problem being tightness in the L ankle. She was able to don and doff the garment without significant problem. She does not have the power jacket for the arabella with her and she does not remember seeing it in her home but she will check. If not found then we can instruct in use of the comprilan overwrapping to gain compression on the arabella half leg garment. Pt and  were then instructed in donning of below knee B MLB. Pt did not bring camera to video but he was able to observe in the clinic and they have the written instructions in the packet.  has been asked to doff the bandages Sunday or Monday and then don the biacare products on the R LE and another bandage on the L LE. This is how she should come to therapy on her next visit. Applied multi-layer compression bandaging to: Below knee [x]  Thigh high  []   Upper Extremity []    Right []   Left []    Bilateral [x]    The following multi-layer bandages were applied:  []  Tricofix            [x]  Silver Stockinette             []  Tg soft  []  Comperm    []  Transelast     Padding layer:  [x]  Cellona-feet and ankles        []  Fleece    Foam:  [] 10cm roll  [x] 12cm roll-half a roll on each foot  [x] 15cm roll-ankle to knee  [] Gray foam  [] Komprex  [] Komprex 2      Short stretch bandages:   [] 4cm  [x] 6cm  [x] 8cm  [x] 10cm  [x] 12cm  Educated patient in multi-layer bandaging donning principles and precautions. Kinesiotaping: deferred   Girth/Volume measurement: Reviewed results of volumetric measurements taken on evaluation. TOTAL TREATMENT 120 mins     Circumferential Limb Measurements:  Deferred today    ASSESSMENT:   Treatment effectiveness and tolerance: Pt tolerates treatment well and can demonstrate understanding of the ROM exercise routine, self MLD sequence, need to walk in the home and avoid prolong sitting but she just has not yet initiated beginning these at home.   It was stressed to patient today that her involvement and participation in a home program with the assistance of her  for what she needs help with is vital to her success in this program.  She states that she understands this is not a passive therapy program.  She was given a daily log for each week to chart her participation with components of her home program requested. She is to complete the form as done in the home and return to her next appointment. Progress toward goals: STGs 4 and 5 met today. See goal section for details. PLAN OF CARE:   Changes to the plan of care: Continue with current POC.    Frequency: []  2 times a week  [x]  Weekly  []  Biweekly  []  Monthly   Other: n/a       Antolin Ahr, PT, CLFARHAN

## 2018-04-12 ENCOUNTER — HOSPITAL ENCOUNTER (OUTPATIENT)
Dept: PHYSICAL THERAPY | Age: 78
Discharge: HOME | End: 2018-04-12
Payer: MEDICARE

## 2018-04-12 VITALS — WEIGHT: 293 LBS | BODY MASS INDEX: 44.41 KG/M2 | HEIGHT: 68 IN

## 2018-04-12 PROCEDURE — 97140 MANUAL THERAPY 1/> REGIONS: CPT

## 2018-04-12 PROCEDURE — 97110 THERAPEUTIC EXERCISES: CPT

## 2018-04-12 NOTE — PROGRESS NOTES
[address] [place] [address]  [facility]  [address], [address] [place] [address], [facility] 22.    LYmphedema Therapy  Visit: 3    [x]                  Daily note               []                 30 day/10th visit progress note    NAME: Franco Bender  DATE: 4/12/2018     GOALS  Short term goals met:    4. Patient's current compression DME will be evaluated for appropriateness. Pt's L custom BK arabella garment was donned and is missing the power jacket but pt informed that her  could apply comprilans to provide the added pressure. Pt's custom old flat knit knee high stockings were donned on the R.  Pt able to doff the garment with donning gloves and dycem on her foot. The fit of the garment is no longer good but pt was able to demonstrate understanding of the donning process and was able to assist with doffing using a piece of dycem on the floor. Goal met 4-3-18. 5.  Patient's new velcro compression DME will be evaluated for appropriateness once received by the patient. Pt brought in the new compreflex lite lower leg pieces (4) and the foot pieces (4) and the garment was donned on the R  LE. The fit around the foot and ankle was good but the garment is too big around mid calf superiorally. Pt will try and return 3 lower leg pieces for the size XL versus the XXL. Goal met 4-3-18. Short term goals to meet by 4-:  1. Patient and caregiver, , will demonstrate knowledge of signs/symptoms of infections/cellulitis and be independent in skin care to prevent cellulitis. Demonstration of skin care performed on LEs today and patient can verbalize understanding but does not follow through. She cut her skin on 2 toes while trimming her nails at home and these areas bleed. Pt states that she wiped the areas with a \"wet one\" but did not apply any antibiotic ointment despite being able to verbalize the need.   She said that she would have to get up and get the antibiotic and she did not want to do that. 2.  Patient will demonstrate independence in lymphedema home program of therapeutic exercises to improve circulation and decongest limb to improve ADLs. Pt and  education in the ROM remedial lymphedema exercise routine to date. Pt can verbalize the specific exercises but has not been following through with them. States that she just did not do them. 3.  Patient/ will be able to don a safe and effective knee high multi-layer bandages (MLB) in the home setting. Pt and  educated in don/doff/precautions of MLB and he was able to don safe and effective below knee MLB at home. He will need to continue with this on an ongoing basis. Will check his performance on the next visit as well.          Long term goals  Time frame: 6-  1. Pt will show improvement in Lymphedema Life Impact Scale by decreasing impairment percentage to under 40% and thus allow improvement in patient's quality of life. 2.  Patient/caregiver will be independent with don/doff of compression system and use in order to prevent reaccumulation of fluid at discharge.  was able to don a safe knee high MLB to B LEs at home. He will also need to be trained in use of the biacare products once they arrive. 3.  Patient will purchase a pair of shoes that will fit and can be worn with her compression garments. Pt encouraged to purchase a pair of shoes that will fit with the compreflex boot in place. She is to bring them to her next appointment. 3.  Pt/caregiver will be independent in self-MLD and show stable limb volumes showing decongestion and pt. ready for transition to independent restorative phase of lymphedema therapy. Pt has a understanding of the self MLD sequence and has the written program to follow at home but states that she has not performed such at home since the evaluation was done last week.        SUBJECTIVE REPORT:  Pt states that she did not complete her weekly log because she did not have a pencil. Pt did not care for her skin properly despite knowing what to do. Pt with good knowledge of what should be done but states that she just does not do it. Pain:  Pain Scale 1: Numeric (0 - 10)     Pain Intensity 1: 0     Pain Location 1: Knee     Pain Orientation 1: Left     Patient Stated Pain Goal: 0     Gait:    [x]  Modified Independent gait with bariatric wheeled walker in the clinic for up to 15 feet of travel. Pt states that she did not do any walking at home this time period. ADLs:  Pt will need assistance from her spouse for management of the compression DME-bandaging. Treatment Response:    [x]   Patient reviewed packet received at evaluation  []   Patient completed home program as prescribed  [x]   Patient not compliant with home program to date as she did not complete the ROM exercise routine, self MLD, try to increase gait, or change her posture from prolong sitting. Her  did his part of doffing the B MLBs, performing skin care, and donning of knee high B MLBs, as well as returning the biacare products for a smaller size. [x]   Patient did bring in clean bandaging supplies to use today. Function:   [x]   Patient will require assistance with completion of home program in the area of compression  []   ADLs are requiring less assistance   []   Patient able to return to work/leisure activities  Lymphedema Life Impact Scale: deferred today  Weight: deferred today. 316.4 lbs today compared to 312.9 lbs on 3/30/2018. TREATMENT AND OBJECTIVE DATA SUMMARY:   Patient/Family Education:      Educated in skin care: [x]   Skin care products  [x]   Hygiene  [x]  Prevention of cellulitis  [x]   Wound care     Educated in exercise: [x]   Walking program  []   Maria Fernanda ball routine  []   Stick routine  [x]   ROM routine     Instructed in self MLD:   Written sequence given and demonstrated and verbalize to patient and .      Instructed in don/doff of compression system:   [x]   Multi layer bandage (MLB) donning principles and wear precautions  [x]   Day garments-compreflex lite lower leg and foot pieces  [x]   Night garments-L below knee arabella garment with comprilans to serve as compression as she does not know where the power jacket for the arabella is located. Included washing and drying procedures for the arabella and compreflex products. Therapeutic Activity 0 minutes   Treatment time: n/a  Functional Mobility: Mod I with transfers electric chair to commode. Fall risk: Yes with limited gait and use of the wheeled walker for very short household distances. Therapeutic Exercise/Procedure 30 minutes   Treatment time: 9:25-9:55 am  Zokem exercise program: Deferred at this time. Free exercises/ROM: Pt able to transfer with mod I and ambulate with bariatric wheeled walker in the clinic times 30 feet and also to stand up on the scale for weighing. Pt required min assistance to manage B LEs to and from supine on the mat table. Demonstration by therapist of written routine. Patient able to verbalize understanding of the seated exercises and the deep abd breathing but states that she did not try to perform any of the routine at home this time period either. She was instructed in the importance of daily compliance with every day or bid exercise sessions to promote movement of fluid from her LEs. Pt was asked for her written weekly log and she states that she did not complete the log as she did not have a pencil. Upon further questioning, pt did not perform any MLD, short ambulation in the home, change eating habits, or perform exercise routines. She did allow her  to change the bandages however. Pt also instructed in deep abd breathing. Pt tolerated ROM of each LE for therapist to doff B knee high MLB while she was supine on mat table.    Home program: Patient to perform daily to BID:  [x]   Skin care  [x]   Deep abdominal breathing  [x]   Exercise routine  [x]   Walking program  [x]   Rest in supine   [x]   Compression bandage with  changing the bandages every 4-5 days. [x]   Compression garments to bring back to the next visit should they arrive in the home.  []   Vasopneumatic device  []   Wound care  [x]   Self MLD  [x]   Bring supplies to each therapy visit  []   Purchase necessary supplies  [x]   Weight loss program  [x]   Complete weekly log and return to the next visit       Rationale: Exercise will increase the lymph angiomotoricity and tissue pressure of the skin and thus decrease swelling. Modalities 0 minutes   Treatment time: n/a  Vasopneumatic pump: Deferred at this time as pt with too many cardiac, respiratory and kidney issues. Manual Lymphatic Drainage (MLD) 60 minutes   Treatment time: 9:55-10:55 am  Area to decongest:    [x] LE             [x]  Right     [x]  Left      [x] Trunk         Sequence used and effectiveness:     [x] Primary sequence for lower extremities with trunk involvement     [x] Modifications were made to manual lymph drainage sequence to exclude cervical techniques secondary to patient's age    [x] Self MLD sequence/techniques/hand strokes. Pt was given the written handout with explanation and demonstration on previous visit. She reports that she did not complete any of the components and that she does not have any questions regarding the specifics of the program.  Pt educated in the benefits of performing the program daily to BID and highly encouraged to perform such. Pt can verbalize each of the steps and in the order in which they should be done. Skin/wound care/debridement: Pt with slight bruising from the comprex II padding that was used on previous bandage. her  did not use the products again for his bandage which was a correct decision on his part. Pt with 2 open areas on her toes where she cut her skin while cutting her toe nails at home.   Pt describes that they bleed and that she used a \"wet one\" to wipe away the blood and that was it. She knew to use an antibiotic ointment but chose not to do this. She can repeat precautions and proper care to prevent cellulitis. Pt was again educated in these areas and encouraged to follow thru with the knowledge that she has in skin care and prevention of cellulitis. Pt with dry skin primarily on her LEs but it is improving. Pt with staining of her lower leg section. Her feet and lower legs were washed with dove soap and remedy repair lotion was used afterwards with application using MLD principles. Upper/Lower extremity compression: Compreflex lite lower leg (4 sets) have all been sent back and the vendor will replace with a smaller size. They are awaiting their arrival and will bring to the clinic for fitting. The upper two straps will be too loose as her girth in that area is too small for the sizing chart required for a XXL garment. This was shown to patient and her spouse. They will call the vendor and try and return the lower leg pieces for smaller size. Pt and  educated in don/doff/precautions/care/appropriateness of these garments.  to try and don the product just prior to patient returning to the clinic next week. He will also call the vendor where he purchased the products to see about exchanging for a smaller size. Patient's bandage was inspected and her  did not good job with donning. He used all layers appropriatly including the ankles pad. He has bedn asked to continue with doff and don knee high bandages every 4-5 days at home. Pt and  were again instructed in donning of below knee B MLB. Applied multi-layer compression bandaging to:     Below knee [x]  Thigh high  []   Upper Extremity []    Right []   Left []    Bilateral [x]    The following multi-layer bandages were applied:  []  Tricofix            [x]  Silver Stockinette             []  Tg soft  [] Comperm    []  Transelast     Padding layer:  [x]  Cellona-feet and ankles        []  Fleece    Foam:  [] 10cm roll  [x] 12cm roll-half a roll on each foot  [x] 15cm roll-ankle to knee  [] Gray foam  [x] Komprex-B ankle pads  [] Komprex 2      Short stretch bandages:   [] 4cm  [x] 6cm  [x] 8cm  [x] 10cm  [x] 12cm  Educated patient in multi-layer bandaging donning principles and precautions. Kinesiotaping: deferred   Girth/Volume measurement: deferred     TOTAL TREATMENT 90 mins     Circumferential Limb Measurements:  Deferred today    ASSESSMENT:   Treatment effectiveness and tolerance: Pt tolerates MLB well and her  did a very good job with donning of the bandages at home. Pt is not participating in her home program as directed despite being able to verbalize all components. She states that she just does not want to do it. Progress toward goals:   See goal section for details.      PLAN OF CARE:   Changes to the plan of care: Continue with current POC with education and fitting of new compression garments when they arrive and once  is I with this patient will be discharge from therapy as she does not want to complete any other components of her home program.   Frequency: []  2 times a week  [x]  Weekly  []  Biweekly  []  Monthly   Other: n/a       Char Lal PT, EMILIANO

## 2018-04-20 ENCOUNTER — HOSPITAL ENCOUNTER (OUTPATIENT)
Dept: PHYSICAL THERAPY | Age: 78
Discharge: HOME | End: 2018-04-20
Payer: MEDICARE

## 2018-04-20 VITALS — WEIGHT: 293 LBS | BODY MASS INDEX: 44.41 KG/M2 | HEIGHT: 68 IN

## 2018-04-20 PROCEDURE — G8991 OTHER PT/OT GOAL STATUS: HCPCS

## 2018-04-20 PROCEDURE — 97140 MANUAL THERAPY 1/> REGIONS: CPT

## 2018-04-20 PROCEDURE — G8990 OTHER PT/OT CURRENT STATUS: HCPCS

## 2018-04-20 PROCEDURE — 97110 THERAPEUTIC EXERCISES: CPT

## 2018-04-20 NOTE — PROGRESS NOTES
[facility]  Physical Therapy Lymphedema Clinic  [address] [facility], [address] [place] [address], [facility] 22.    LYmphedema Therapy  Visit: 4    []                  Daily note               [x]                 30 day/10th visit progress note with update G codes for medicare    NAME: Adele Gutierrez  DATE: 4/20/2018     GOALS  Short term goals met:    4. Patient's current compression DME will be evaluated for appropriateness. Pt's L custom BK arabella garment was donned and is missing the power jacket but pt informed that her  could apply comprilans to provide the added pressure. Pt's custom old flat knit knee high stockings were donned on the R.  Pt able to doff the garment with donning gloves and dycem on her foot. The fit of the garment is no longer good but pt was able to demonstrate understanding of the donning process and was able to assist with doffing using a piece of dycem on the floor. Goal met 4-3-18. 5.  Patient's new velcro compression DME will be evaluated for appropriateness once received by the patient. Pt brought in the new compreflex lite lower leg pieces (4) and the foot pieces (4) and the garment was donned on the R  LE. The fit around the foot and ankle was good but the garment is too big around mid calf superiorally. Pt will try and return 3 lower leg pieces for the size XL versus the XXL. Goal met 4-3-18. Short term goals to meet by 4-:    1. Patient and caregiver, , will demonstrate knowledge of signs/symptoms of infections/cellulitis and be independent in skin care to prevent cellulitis. Demonstration of skin care performed on LEs today and patient can verbalize understanding but does not follow through. She was going to the store today and reports that she will purchase heel balm and lotion with dimethicone. She also knows to have A&D ointment at home.   2.  Patient will demonstrate independence in lymphedema home program of therapeutic exercises to improve circulation and decongest limb to improve ADLs. Pt and  education in the ROM remedial lymphedema exercise routine to date. Pt can verbalize the specific exercises but has not been following through with them. States that she did more of them this time while at home. 3.  Patient/ will be able to don a safe and effective knee high multi-layer bandages (MLB) in the home setting. Pt and  educated in don/doff/precautions of MLB and he was able to don safe and effective below knee MLB at home. He did not practice during this time period.         Long term goals  Time frame: 6-  1. Pt will show improvement in Lymphedema Life Impact Scale by decreasing impairment percentage to under 40% and thus allow improvement in patient's quality of life. Scale given to patient today and her number increased from 56 to 76%. Will need to evaluate further. 2.  Patient/caregiver will be independent with don/doff of compression system and use in order to prevent reaccumulation of fluid at discharge.  educated in the don/doff/care/precautions of the biacare lite transition set for the lower legs with the compreflex foot piece.  was able to don the L LE set of garments with feedback from therapist.  3.  Patient will purchase a pair of shoes that will fit and can be worn with her compression garments. Pt encouraged to purchase a pair of shoes that will fit with the compreflex boot in place. She is to bring them to her next appointment as she is leaving today with the compreflex boots in place. 3.  Pt/caregiver will be independent in self-MLD and show stable limb volumes showing decongestion and pt. ready for transition to independent restorative phase of lymphedema therapy. Pt has a understanding of the self MLD sequence and has the written program to follow at home.   Limited measurements were taken today but a reduction in girth has been noted in her thighs. SUBJECTIVE REPORT:  Pt did bring back her weekly log and is does show that pt was attempting to apply lotion to her knees and thighs and she did participate in some of the ROM exercises from the routine. She did not perform any MLD on her trunk. She states that she did not have her  change her bandages as she was so busy this week. Pain:  Pain Scale 1: Numeric (0 - 10)     Pain Intensity 1: 3     Pain Location 1: Knee     Pain Orientation 1: Left     Patient Stated Pain Goal: 0     Gait:    [x]  Modified Independent gait with bariatric wheeled walker in the clinic for up to 15 feet. Pt states that she did not do any walking at home this time period as she kept her walker outside on the back porch. ADLs:  Pt needs assistance from her spouse for management of the compression DME-bandaging. Treatment Response:    [x]   Patient reviewed packet received at evaluation  []   Patient completed home program as prescribed  [x]   Patient not compliant with home program to date as she did not complete the ROM exercise routine in its entirety, self MLD for her trunk, try to increase gait, or change her posture from prolong sitting. Her  did not change her bandages as requested. They did bring in the new biacare product as is was delivered to her home. [x]   Patient did bring in the new biacare compreflex lite, transfer products for her lower legs and feet. Function:   [x]   Patient will require assistance with completion of home program in the area of compression  []   ADLs are requiring less assistance   []   Patient able to return to work/leisure activities  Lymphedema Life Impact Scale: Pt scored 52 with 76% impairment today. Weight: deferred today. 313.4lbs today compared to 312.9 lbs on 3/30/2018.   TREATMENT AND OBJECTIVE DATA SUMMARY:   Patient/Family Education:      Educated in skin care: [x]   Skin care products  [x]   Hygiene  [x]  Prevention of cellulitis  [x]   Wound care Educated in exercise: [x]   Walking program  []   Maria Fernanda ball routine  []   Stick routine  [x]   ROM routine     Instructed in self MLD:   Written sequence given and demonstrated and verbalize to patient and . Instructed in don/doff of compression system:   [x]   Multi layer bandage (MLB) donning principles and wear precautions  [x]   Day garments-compreflex lite-transfer lower leg and foot pieces  [x]   Night garments-L below knee arabella garment with comprilans to serve as compression as she does not know where the power jacket for the arabella is located. Included washing and drying procedures for the arabella and compreflex products. Therapeutic Activity 0 minutes   Treatment time: n/a  Functional Mobility: Mod I with transfers electric chair to commode. Fall risk: Yes with limited gait and use of the wheeled walker for very short household distances. Therapeutic Exercise/Procedure 30 minutes   Treatment time: 10-10:30 am  Maria Fernanda ball exercise program: Deferred at this time. Free exercises/ROM: Pt able to transfer with mod I and ambulate with bariatric wheeled walker in the clinic times 30 feet and also to stand up on the scale for weighing. Pt required min assistance to manage B LEs to and from supine on the mat table. Pt also instructed in deep abd breathing. Pt tolerated ROM of each LE for therapist to doff B knee high MLB while she was supine on mat table. She then was able to perform AROM of LEs for therapist to take modified volumetric measurements.    Home program: Patient to perform daily to BID:  [x]   Skin care  [x]   Deep abdominal breathing  [x]   Exercise routine  [x]   Walking program  [x]   Rest in supine   [x]   Compression bandage by  as needed and at least once a week  [x]   biacare products to be changed daily by  with skin inspection and clean liners  []   Vasopneumatic device  []   Wound care  [x]   Self MLD  [x]   Bring supplies to each therapy visit  []   Purchase necessary supplies  [x]   Weight loss program  [x]   Complete weekly log and return to the next visit       Rationale: Exercise will increase the lymph angiomotoricity and tissue pressure of the skin and thus decrease swelling. Modalities 0 minutes   Treatment time: n/a  Vasopneumatic pump: Deferred at this time as pt with too many cardiac, respiratory and kidney issues. Manual Lymphatic Drainage (MLD) 60 minutes   Treatment time: 10:30-11:30 am  Area to decongest:    [x] LE             [x]  Right     [x]  Left      [x] Trunk         Sequence used and effectiveness:     [x] Primary sequence for lower extremities with trunk involvement     [x] Modifications were made to manual lymph drainage sequence to exclude cervical techniques secondary to patient's age    [x] Self MLD sequence/techniques/hand strokes. Pt was given the written handout with explanation and demonstration on previous visits. She reports that she completed the thigh and knee portion only but none of the trunk pre-work. She has been encouraged to perform trunk work first to aid in mobilizing fluid. Pt can verbalize each of the steps and in the order in which they should be done. Skin/wound care/debridement: Pt with healing skin on her toes where she cut her skin while cutting her toe nails at home last week but old blood is still in the area. Pt was again educated in these areas and encouraged to follow thru with the knowledge that she has in skin care and prevention of cellulitis. Pt with dry skin primarily on her LEs but it is improving. Pt with staining of her lower leg section. Her feet and lower legs were washed with dove soap and eucerin lotion was used afterwards with application using MLD principles. Upper/Lower extremity compression: Compreflex lite transfer lower leg (4 sets) have arrived and include the compression liners but she also purchased the foot pieces as well.   One set was fitted to the R LE with instruction for patient and the . Then the  was able to don the L LE transfer liner, lower leg and foot pieces. He was able to apply the compression meter velcro taps correctly and pull with tension set at 20-30 mmHg.  demonstrated good knowledge. The fit is good. Pt now needs to get velcro shoes to use with the garments. Pt and  educated in don/doff/precautions/care/appropriateness of these garments.  to try and don the product just prior to patient returning to the clinic next week. Pt and  were reminded that use of the bandage should not be stopped but rather will be appropriate at least once a week unless needed more frequently. Patient and  were educated that bandages should be changed more frequently then 8 days and despite the multiple reasons of why it did not occur this time, that really they need to be changed for skin care and skin inspection. Deferred donning multi-layer compression bandaging to: Below knee [x]  Thigh high  []   Upper Extremity []    Right []   Left []    Bilateral [x]  Today as the biacare products were used instead. Kinesiotaping: deferred   Girth/Volume measurement: Volumes were taken of all but the last 3 markings and they do show decongestion in her LEs but will defer the final result to when the complete measurements are taken.  (pt unable to slide pant legs high enough and it was too difficult for pt to doff pants once supine on the mat table.)  Pt requesting custom flat knit knee high stockings and the measurements were begun today. Would like to check the measurements once more before submitting the order. TOTAL TREATMENT 90 mins     Circumferential Limb Measurements:  See paper chart.     ASSESSMENT:   Treatment effectiveness and tolerance: Pt tolerated the MLBs well but was to have her  re-bandage while at home during the past 8 days; however, pt offered many reasons of why they were too busy to get it done. She was fitted with the biacare products and they are to readjust the straps throughout the day and remove them daily with skin care and change in liners performed. Pt  has shown good understanding of the products but it appears to be the patient that does not allow him to perform the compression component as it needs to be done. Pt is requesting custom flat knee high stockings and was measured today. Progress toward goals:   See goal section for details. PLAN OF CARE:   Changes to the plan of care: Continue with current POC. Will bring pt back early next week to get final measurements for the custom knee high stockings.    Frequency: []  2 times a week  [x]  Weekly  []  Biweekly  []  Monthly   Other: n/a       Luisa Ochoa PT, EMILIANO

## 2018-04-24 ENCOUNTER — HOSPITAL ENCOUNTER (OUTPATIENT)
Dept: PHYSICAL THERAPY | Age: 78
Discharge: HOME | End: 2018-04-24
Payer: MEDICARE

## 2018-04-24 NOTE — PROGRESS NOTES
[address] [place] [address]  Physical Therapy Lymphedema Clinic  [address] [facility], [address], [facility] 22.    Lymphedema Therapy      4/24/2018:  Sekou Perez was not seen on this date for physical therapy for the following reasons:    [x]      Patient called to cancel the visit for the following reasons: Pt states that she nor her  are feeling well today. She has rescheduled for this Thursday. []      Patient missed the visit and did not call to cancel.     Noe Valerio, PT

## 2018-04-26 ENCOUNTER — HOSPITAL ENCOUNTER (OUTPATIENT)
Dept: PHYSICAL THERAPY | Age: 78
Discharge: HOME | End: 2018-04-26
Payer: MEDICARE

## 2018-04-26 PROCEDURE — 97110 THERAPEUTIC EXERCISES: CPT

## 2018-04-26 PROCEDURE — 97140 MANUAL THERAPY 1/> REGIONS: CPT

## 2018-04-26 NOTE — PROGRESS NOTES
[facility]  Physical Therapy Lymphedema Clinic  [address] [facility], [address] [place] [address] 22.    LYmphedema Therapy  Visit: 5    [x]                  Daily note               []                 30 day/10th visit progress note with update G codes for medicare    NAME: Rico Wolff  DATE: 4/26/2018     GOALS  Short term goals met:    4. Patient's current compression DME will be evaluated for appropriateness. Pt's L custom BK arabella garment was donned and is missing the power jacket but pt informed that her  could apply comprilans to provide the added pressure. Pt's custom old flat knit knee high stockings were donned on the R.  Pt able to doff the garment with donning gloves and dycem on her foot. The fit of the garment is no longer good but pt was able to demonstrate understanding of the donning process and was able to assist with doffing using a piece of dycem on the floor. Goal met 4-3-18. 5.  Patient's new velcro compression DME will be evaluated for appropriateness once received by the patient. Pt brought in the new compreflex lite lower leg pieces (4) and the foot pieces (4) and the garment was donned on the R  LE. The fit around the foot and ankle was good but the garment is too big around mid calf superiorally. Pt will try and return 3 lower leg pieces for the size XL versus the XXL. Goal met 4-3-18. Short term goals to meet by 4-:    1. Patient and caregiver, , will demonstrate knowledge of signs/symptoms of infections/cellulitis and be independent in skin care to prevent cellulitis. Patient and her  have knowledge of proper skin care and are aware of signs/symptoms of cellulitis. Demonstration of skin care and patient can verbalize understanding but does not follow through. Patient has secured appropriate skin care items for home use. Goal Met 4/26/18.   2.  Patient will demonstrate independence in lymphedema home program of therapeutic exercises to improve circulation and decongest limb to improve ADLs. Pt and  education in the ROM remedial lymphedema exercise routine to date. Pt can verbalize the specific exercises but needs many modifications to complete. Patient has written handouts to follow. Patient needs to be consistent with participation in the home setting. Goal Met 4/26/18  3. Patient/ will be able to don a safe and effective knee high multi-layer bandages (MLB) in the home setting. Pt and  educated in don/doff/precautions of MLB and he was able to don safe and effective below knee MLB at home. Patient's  able to manage as needed in the home when not utilizing her velcro products. Measurements taken for  custom knee highs and rechecked today for sizing and was sent to vendor for processing. Goal Met 4/26/18         Long term goals  Time frame: 6-  1. Pt will show improvement in Lymphedema Life Impact Scale by decreasing impairment percentage to under 40% and thus allow improvement in patient's quality of life. Patient scored a 46 last visit with a percent impairment of 76% this score was higher that the score on re-evaluation. Will need to evaluate further on upcoming visits. 2.  Patient/caregiver will be independent with don/doff of compression system and use in order to prevent reaccumulation of fluid at discharge.  educated in the don/doff/care/precautions of the biacare lite transition set for the lower legs with the compreflex foot piece.  was able to don the L LE set of garments well in the home setting. Custom Elvarex knee highs were measured for last visit and these measurements were re-checked today and order sent today. Order will be placed with [employer] and education on don/doff and care of the custom knee highs will be done when when arrive.    3.  Patient will purchase a pair of shoes that will fit and can be worn with her compression garments. Patient has issues with finding proper foot wear. Needs to continue to look for a more supportive shoes. 4.  Pt/caregiver will be independent in self-MLD and show stable limb volumes showing decongestion and pt. ready for transition to independent restorative phase of lymphedema therapy. Pt has a understanding of the self MLD sequence and has the written program to follow at home. Will continue to measure volumes as she transitions to new garments. SUBJECTIVE REPORT:  Patient had to cancel her last appointment because she and her  were not feeling well. Today she was agreeable to proceed with ordering her custom knee high compression garments. Pain:0/10  Gait:    [x]  Modified Independent gait with bariatric wheeled walker in the clinic for up to 15 feet. Limited ambulation in the home. ADLs:  Pt needs assistance from her spouse for management of the compression DME-bandaging. Treatment Response:    [x]   Patient reviewed packet received at evaluation  []   Patient completed home program as prescribed  [x]   Patient not compliant with home program to date as she did not complete the ROM exercise routine in its entirety, self MLD for her trunk, try to increase gait, or change her posture from prolong sitting. Patient did wear in the biacare products as recommended. Patient has not brought in her daily logs as requested. [x]   Patient did wear in her biacare compreflex lite, transfer products for her lower legs and feet.   Function:   [x]   Patient will require assistance with completion of home program in the area of compression  []   ADLs are requiring less assistance   []   Patient able to return to work/leisure activities  Lymphedema Life Impact Scale: Deferred today  Weight: Deferred today  TREATMENT AND OBJECTIVE DATA SUMMARY:   Patient/Family Education:      Educated in skin care: [x]   Skin care products  [x]   Hygiene  [x]  Prevention of cellulitis  [x]   Wound care Instructed in self MLD: Written sequence given and demonstrated and verbalize to patient and . Instructed in don/doff of compression system:   [x]   Multi layer bandage (MLB) donning principles and wear precautions  [x]   Day garments-compreflex lite-transfer lower leg and foot pieces  [x]   Night garments-L below knee arabella garment with comprilans to serve as compression as she does not know where the power jacket for the arabella is located. Included washing and drying procedures for the Arabella and compreflex products. Educated in exercise:                                                Encouraged walking and active range with modifications. Therapeutic Activity 0 minutes   Treatment time: n/a  Functional Mobility: Mod I with transfers electric chair to commode. Fall risk: Yes with limited gait and use of the wheeled walker for very short household distances. Therapeutic Exercise/Procedure 35 minutes   Treatment time: 12:10pm-12:45pm  Maria Fernanda Chirply exercise program: Deferred at this time. Free exercises/ROM: Pt able to transfer with mod I and ambulate with bariatric wheeled walker in the clinic times 30 feet in the clinic with effort. Pt required min assistance to manage B LEs to and from supine on the mat table. Pt also instructed in deep abd breathing. She then was able to perform AROM of LEs (supine and seated) for therapist with modifications. Encouraged her to exercise daily to improve her condition. Patient has been given written handouts and a daily log, but she has not been able to be consistent with exercise in the home setting. Home program: Patient to perform daily to BID:  [x]   Skin care  [x]   Deep abdominal breathing  [x]   Exercise routine  [x]   Walking program  [x]   Rest in supine   [x]   Compression bandage by  as needed and at least once a week and use of velcro systems.    [x]   biacare products to be changed daily by  with skin inspection and clean liners  []   Vasopneumatic device  []   Wound care  [x]   Self MLD  [x]   Bring supplies to each therapy visit  [x]   Purchase necessary supplies (custom knee highs)  [x]   Weight loss program  [x]   Complete weekly log and return to the next visit       Rationale: Exercise will increase the lymph angiomotoricity and tissue pressure of the skin and thus decrease swelling. Modalities 0 minutes   Treatment time: n/a  Vasopneumatic pump: Deferred at this time as pt with too many cardiac, respiratory and kidney issues. Manual Lymphatic Drainage (MLD) 80 minutes   Treatment time: 10:50am-12:10pm  Area to decongest:    [x] LE             [x]  Right     [x]  Left      [x] Trunk   Sequence used and effectiveness:     [x] Primary sequence for lower extremities with trunk involvement     [x] Modifications were made to manual lymph drainage sequence to exclude cervical techniques secondary to patient's age    [x] Self MLD sequence/techniques/hand strokes. Pt was given the written handout with explanation and demonstration on previous visits. She reports that she completed the thigh and knee portion only but none of the trunk pre-work. She has been encouraged to perform trunk work first to aid in mobilizing fluid. Pt can verbalize each of the steps and in the order in which they should be done. Skin/wound care/debridement: Pt with healing skin on her toes where she cut her skin while cutting her toe nails at home. Pt was again educated in these areas and encouraged to follow recommendations in skin care and prevention of cellulitis. Pt with dry skin primarily on her LEs but it is improving. Pt with staining of her lower leg section. Her feet and lower legs were washed with dove soap and eucerin lotion was used afterwards with application using MLD principles.      Upper/Lower extremity compression: Compreflex lite transfer lower leg (4 sets)  and include the compression liners but she also purchased the foot pieces as well. Patient's  good knowledge of use of the velcro garments. Pt now needs to get velcro shoes to use with the garments. Pt and  educated in  the don/doff/precautions/care/appropriateness of these garments. Pt and  were reminded that use of the bandage should not be stopped but rather will be appropriate at least once a week unless needed more frequently. Patient and  were educated that bandages should be changed more frequently then 8 days and despite the multiple reasons of why it did not occur this time, that really they need to be changed for skin care and skin inspection. Measurements were started last week for Custom Elvarex Knee highs last visit. Today we reassessed all girths and proceeded with ordering a pair of custom flat knit knee highs for the patient. See measurement forms in the paper chart. Deferred donning multi-layer compression bandaging to: Below knee [x]  Thigh high  []   Upper Extremity []    Right []   Left []    Bilateral [x]  Today as the biacare products were used instead. Kinesiotaping: Deferred   Girth/Volume measurement: Volumes deferred for reassessing her custom garment measurement sheet today. Pt requesting custom flat knit knee high stockings and the measurements were begun last visit and checked again today before order was placed. Will assess full volumes next visit. TOTAL TREATMENT 115 mins     Circumferential Limb Measurements:  See paper chart for measurements for custom garments. ASSESSMENT:   Treatment effectiveness and tolerance: Patient is making gains and has met all STGs to date. Patient continues to have some issues with full compliance with her home program. Encouraged to perform all aspects of CDT daily and given a home program daily log, but she has not turned this in for review.  Will will continue to focus on education and await arrival of her custom knee highs in order to have items she will need to be discharged to the restorative phase of care. Progress toward goals: See goal section for details. All STGs met. Will continue to focus on the LTGs to prepare patient for discharge to the restorative phase of care. PLAN OF CARE:   Changes to the plan of care: Continue with current POC. Frequency: []  2 times a week  [x]  Weekly  []  Biweekly  []  Monthly   Other: Custom Flat Knit Knee high order sent to [employer].         Ramin Echeverria, PTA, CLT

## 2018-05-04 ENCOUNTER — HOSPITAL ENCOUNTER (OUTPATIENT)
Dept: PHYSICAL THERAPY | Age: 78
Discharge: HOME | End: 2018-05-04
Payer: MEDICARE

## 2018-05-04 PROCEDURE — 97140 MANUAL THERAPY 1/> REGIONS: CPT

## 2018-05-04 PROCEDURE — 97110 THERAPEUTIC EXERCISES: CPT

## 2018-05-04 NOTE — PROGRESS NOTES
[facility]  Physical Therapy Lymphedema Clinic  [address] [facility], [address] [place] [address] 22.    LYmphedema Therapy  Visit: 6    [x]                  Daily note               []                 30 day/10th visit progress note with update G codes for medicare    NAME: Geo Mendoza  DATE: 5/4/2018   GOALS  Short term goals met:    4. Patient's current compression DME will be evaluated for appropriateness. Pt's L custom BK arabella garment was donned and is missing the power jacket but pt informed that her  could apply comprilans to provide the added pressure. Pt's custom old flat knit knee high stockings were donned on the R.  Pt able to doff the garment with donning gloves and dycem on her foot. The fit of the garment is no longer good but pt was able to demonstrate understanding of the donning process and was able to assist with doffing using a piece of dycem on the floor. Goal met 4-3-18. 5.  Patient's new velcro compression DME will be evaluated for appropriateness once received by the patient. Pt brought in the new compreflex lite lower leg pieces (4) and the foot pieces (4) and the garment was donned on the R  LE. The fit around the foot and ankle was good but the garment is too big around mid calf superiorally. Pt will try and return 3 lower leg pieces for the size XL versus the XXL. Goal met 4-3-18. Short term goals to meet by 4-:    1. Patient and caregiver, , will demonstrate knowledge of signs/symptoms of infections/cellulitis and be independent in skin care to prevent cellulitis. Patient and her  have knowledge of proper skin care and are aware of signs/symptoms of cellulitis. Demonstration of skin care and patient can verbalize understanding but does not follow through. Patient has secured appropriate skin care items for home use. Goal Met 4/26/18.   2.  Patient will demonstrate independence in lymphedema home program of therapeutic exercises to improve circulation and decongest limb to improve ADLs. Pt and  education in the ROM remedial lymphedema exercise routine to date. Pt can verbalize the specific exercises but needs many modifications to complete. Patient has written handouts to follow. Patient needs to be consistent with participation in the home setting. Goal Met 4/26/18  3. Patient/ will be able to don a safe and effective knee high multi-layer bandages (MLB) in the home setting. Pt and  educated in don/doff/precautions of MLB and he was able to don safe and effective below knee MLB at home. Patient's  able to manage as needed in the home when not utilizing her velcro products. Measurements taken for custom knee highs and rechecked today for sizing and was sent to vendor for processing. Goal Met 4/26/18      Long term goals  Time frame: 6-  1. Pt will show improvement in Lymphedema Life Impact Scale by decreasing impairment percentage to under 40% and thus allow improvement in patient's quality of life. Patient scored a 46 last visit with a percent impairment of 76% this score was higher that the score on re-evaluation. Will need to evaluate further on upcoming visits. 2.  Patient/caregiver will be independent with don/doff of compression system and use in order to prevent reaccumulation of fluid at discharge.  educated in the don/doff/care/precautions of the biacare lite transition set for the lower legs with the compreflex foot piece.  was able to don the L LE set of garments well in the home setting. Custom Elvarex knee highs order placed with [employer] and education on don/doff and care of the custom knee highs will be done when when arrive. 3.  Patient will purchase a pair of shoes that will fit and can be worn with her compression garments. Patient has issues with finding proper foot wear.  Needs to continue to look for a more supportive shoes.  4.  Pt/caregiver will be independent in self-MLD and show stable limb volumes showing decongestion and pt. ready for transition to independent restorative phase of lymphedema therapy. Pt has a understanding of the self MLD sequence and has the written program to follow at home, but needs to be compliant with performance. Full volumes taken today to reveal that patient has lost 456 ml in the L LE and 230 ml in the R LE since evaluation on 3/30/18. Patient currently 8% difference compared to 10% difference on evaluation. Patient has made improvements in reduction of her swelling. SUBJECTIVE REPORT:  Patient arrived today with velcro compression in place on lower legs.  attends visits and is very supportive. Pain:0/10  Gait:    [x]  Modified Independent gait with bariatric wheeled walker in the clinic for up to 15 feet. Limited ambulation in the home. ADLs:  Pt needs assistance from her spouse for management of the compression DME-bandaging. Treatment Response:    [x]   Patient reviewed packet received at evaluation  []   Patient completed home program as prescribed  [x]   Patient not compliant with home program to date as she did not complete the ROM exercise routine in its entirety, self MLD for her trunk, try to increase gait, or change her posture from prolong sitting. Patient did wear in the biacare products as recommended. Patient has not brought in her daily logs as requested. [x]   Patient did wear in her biacare compreflex lite, transfer products for her lower legs and feet. Awaiting arrival of her custom knee highs.   Function:   [x]   Patient will require assistance with completion of home program in the area of compression  []   ADLs are requiring less assistance   []   Patient able to return to work/leisure activities  Lymphedema Life Impact Scale: Deferred today  Weight: Deferred today  TREATMENT AND OBJECTIVE DATA SUMMARY:   Patient/Family Education:      Educated in skin care: [x]   Skin care products  [x]   Hygiene  [x]  Prevention of cellulitis  [x]   Wound care     Instructed in self MLD: Written sequence given and demonstrated and verbalize to patient and . Instructed in don/doff of compression system:   [x]   Multi layer bandage (MLB) donning principles and wear precautions  [x]   Day garments-compreflex lite-transfer lower leg and foot pieces/ Awaiting arrival of her custom knee highs  [x]   Night garments-L below knee arabella garment with comprilans to serve as compression as she does not know where the power jacket for the arabella is located. Included washing and drying procedures for the Arabella and compreflex products. Educated in exercise:                                                Encouraged walking and active range with modifications. Therapeutic Activity 0 minutes   Treatment time: n/a  Functional Mobility: Mod I with transfers electric chair to commode. Fall risk: Yes with limited gait and use of the wheeled walker for very short household distances. Therapeutic Exercise/Procedure 15 minutes   Treatment time: 12:30pm-12:45pm  Maria Fernanda hines exercise program: Loyda Hernandez routine x 5 reps supine and seated   Free exercises/ROM: Pt able to transfer with mod I and ambulate with bariatric wheeled walker in the clinic times 30 feet in the clinic with effort. Pt required min assistance to manage B LEs to and from supine on the mat table. Pt also instructed in deep abd breathing. Encouraged her to exercise daily to improve her condition. Patient has been given written handouts and a daily log, but she has not been able to be consistent with exercise in the home setting. Home program: Patient to perform daily to BID:  [x]   Skin care  [x]   Deep abdominal breathing  [x]   Exercise routine  [x]   Walking program  [x]   Rest in supine   [x]   Compression bandage by  as needed and at least once a week and use of velcro systems.    [x] Biacare products to be changed daily by  with skin inspection and clean liners  []   Vasopneumatic device  []   Wound care  [x]   Self MLD  [x]   Bring supplies to each therapy visit  [x]   Purchase necessary supplies (custom knee highs)  [x]   Weight loss program  [x]   Complete weekly log and return to the next visit   Rationale: Exercise will increase the lymph angiomotoricity and tissue pressure of the skin and thus decrease swelling. Modalities 0 minutes   Treatment time: n/a  Vasopneumatic pump: Deferred at this time as pt with too many cardiac, respiratory and kidney issues. Manual Lymphatic Drainage (MLD)  85 minutes   Treatment time: [id number]  Area to decongest:    [x] LE             [x]  Right     [x]  Left      [x] Trunk   Sequence used and effectiveness:   [x] Primary sequence for lower extremities with trunk involvement. Focus today on B lower legs below knees post.     [x] Modifications were made to manual lymph drainage sequence to exclude cervical techniques secondary to patient's age    [x] Self MLD sequence/techniques/hand strokes. Pt was given the written handout with explanation and demonstration on previous visits. She reports that she completed the thigh and knee portion only but none of the trunk pre-work. She has been encouraged to perform trunk work first to aid in mobilizing fluid. Pt can verbalize each of the steps and in the order in which they should be done. Skin/wound care/debridement: Pt with healing skin on her toes where she cut her skin while cutting her toe nails at home. Pt was again educated in these areas and encouraged to follow recommendations in skin care and prevention of cellulitis. Would benefit from podiatrist trimming her nails. Pt with dry skin primarily on her LEs but it is improving. Pt with staining of her lower leg section.  Her feet and lower legs were washed with dove soap and eucerin lotion was used afterwards with application using MLD principles. Upper/Lower extremity compression: Compreflex lite transfer lower leg (4 sets) and include the compression liners but she also purchased the foot pieces as well. Patient's  good knowledge of use of the velcro garments. Pt now needs to get velcro shoes to use with the garments. Pt and  educated in the don/doff/precautions/care/appropriateness of these garments. Pt and  were reminded that use of the bandage should not be stopped but rather will be appropriate at least once a week unless needed more frequently. This does not seem to be a consistent form of compression that they are using in the home. Patient was measured for Custom Elvarex Knee highs last visit and order was send. Awaiting arrival of garments. Deferred donning multi-layer compression bandaging to: Below knee [x]  Thigh high  []   Upper Extremity []    Right []   Left []    Bilateral [x]  Today as the biacare products were used instead. Kinesiotaping: Deferred   Girth/Volume measurement: Full volumes taken today to reveal that patient has lost 456 ml in the L LE and 230 ml in the R LE since evaluation on 3/30/18. Patient currently 8% difference compared to 10% difference on evaluation. TOTAL TREATMENT 100 mins     Circumferential Limb Measurements:  Full volumes taken today see above for details. ASSESSMENT:   Treatment effectiveness and tolerance: Patient and  continue to work on her home program, but have not been able to be consistent fully with home program, but she has been able to reduce volumes in bilateral lower legs since evaluation. Patient awaiting her custom flat knit knee highs to arrive. Encouraged to perform all aspects of CDT daily and given a home program daily log, but she has not turned this in for review.  Will will continue to focus on education and await arrival of her custom knee highs in order to have items she will need to be discharged to the restorative phase of care. Progress toward goals: See goal section for details. All STGs met. Will continue to focus on the LTGs to prepare patient for discharge to the restorative phase of care. PLAN OF CARE:   Changes to the plan of care: Continue with current POC. Frequency: []  2 times a week  [x]  Weekly  []  Biweekly  []  Monthly   Other: Custom Flat Knit Knee high order sent to [employer] and patient awaiting arrival of garments.          Ramin Echeverria, PTA, CLT

## 2018-05-11 ENCOUNTER — HOSPITAL ENCOUNTER (OUTPATIENT)
Dept: PHYSICAL THERAPY | Age: 78
Discharge: HOME | End: 2018-05-11
Payer: MEDICARE

## 2018-05-11 PROCEDURE — 97140 MANUAL THERAPY 1/> REGIONS: CPT

## 2018-05-11 PROCEDURE — 97110 THERAPEUTIC EXERCISES: CPT

## 2018-05-11 NOTE — PROGRESS NOTES
[facility]  [address] [facility], [address] [place] [address] 22.    LYmphedema Therapy  Visit: 7    [x]                  Daily note               []                 30 day/10th visit progress note with update G codes for medicare    NAME: Rose Garcia  DATE: 5/11/2018   GOALS  Short term goals met:    4. Patient's current compression DME will be evaluated for appropriateness. Pt's L custom BK arabella garment was donned and is missing the power jacket but pt informed that her  could apply comprilans to provide the added pressure. Pt's custom old flat knit knee high stockings were donned on the R.  Pt able to doff the garment with donning gloves and dycem on her foot. The fit of the garment is no longer good but pt was able to demonstrate understanding of the donning process and was able to assist with doffing using a piece of dycem on the floor. Goal met 4-3-18. 5.  Patient's new velcro compression DME will be evaluated for appropriateness once received by the patient. Pt brought in the new compreflex lite lower leg pieces (4) and the foot pieces (4) and the garment was donned on the R  LE. The fit around the foot and ankle was good but the garment is too big around mid calf superiorally. Pt will try and return 3 lower leg pieces for the size XL versus the XXL. Goal met 4-3-18. Short term goals to meet by 4-:    1. Patient and caregiver, , will demonstrate knowledge of signs/symptoms of infections/cellulitis and be independent in skin care to prevent cellulitis. Patient and her  have knowledge of proper skin care and are aware of signs/symptoms of cellulitis. Demonstration of skin care and patient can verbalize understanding but does not follow through. Patient has secured appropriate skin care items for home use. Goal Met 4/26/18.   2.  Patient will demonstrate independence in lymphedema home program of therapeutic exercises to improve circulation and decongest limb to improve ADLs. Pt and  education in the ROM remedial lymphedema exercise routine to date. Pt can verbalize the specific exercises but needs many modifications to complete. Patient has written handouts to follow. Patient needs to be consistent with participation in the home setting. Goal Met 4/26/18  3. Patient/ will be able to don a safe and effective knee high multi-layer bandages (MLB) in the home setting. Pt and  educated in don/doff/precautions of MLB and he was able to don safe and effective below knee MLB at home. Patient's  able to manage as needed in the home when not utilizing her velcro products. Measurements taken for custom knee highs and rechecked today for sizing and was sent to vendor for processing. Goal Met 4/26/18      Long term goals  Time frame: 6-  1. Pt will show improvement in Lymphedema Life Impact Scale by decreasing impairment percentage to under 40% and thus allow improvement in patient's quality of life. Patient scored a 46 last visit with a percent impairment of 76% this score was higher that the score on re-evaluation. Will need to evaluate further on upcoming visits. 2.  Patient/caregiver will be independent with don/doff of compression system and use in order to prevent reaccumulation of fluid at discharge.  educated in the don/doff/care/precautions of the biacare lite transition set for the lower legs with the compreflex foot piece.  was able to don the L LE set of garments well in the home setting. Patient received her Custom Elvarex knee highs and education on don/doff and care of the custom knee highs was done today. Patient and patient's  educated on the wear and care of products. Will have patient wear products for approximately one week and return for assessment of how she is managing with garments in the home.     3.  Patient will purchase a pair of shoes that will fit and can be worn with her compression garments. Patient has issues with finding proper foot wear. Needs to continue to look for a more supportive shoes. Patient tends to wear slipper socks to the clinic often and needs supportive shoes to address her foot/ankle swelling. 4.  Pt/caregiver will be independent in self-MLD and show stable limb volumes showing decongestion and pt. ready for transition to independent restorative phase of lymphedema therapy. Pt has a understanding of the self MLD sequence and has the written program to follow at home, but needs to be compliant with performance. Full volumes taken last visit to reveal that patient has lost 456 ml in the L LE and 230 ml in the R LE since evaluation on 3/30/18. Patient currently 8% difference compared to 10% difference on evaluation. Patient has made improvements in reduction of her swelling. Today fitted with her new custom garments. SUBJECTIVE REPORT: Patient arrived today with her new custom knee highs to be fitted. Patient reports that she is eager to try them.  present for education today as well. Pain:0/10  Gait:    [x]  Modified Independent gait with bariatric wheeled walker in the clinic for up to 15 feet. Limited ambulation in the home. ADLs:  Pt needs assistance from her spouse for management of the compression DME-bandaging. Treatment Response:    [x]   Patient reviewed packet received at evaluation  []   Patient completed home program as prescribed  [x]   Patient not compliant with home program to date as she did not complete the ROM exercise routine in its entirety, self MLD for her trunk, try to increase gait, or change her posture from prolong sitting. Patient did wear in the [email or web address] products as recommended. Patient has not brought in her daily logs as requested. [x]   Patient did wear in her Lateral SVre compreflex lite, transfer products for her lower legs and feet.  Fitted with her custom knee high today. Function:   [x]   Patient will require assistance with completion of home program in the area of compression  []   ADLs are requiring less assistance   []   Patient able to return to work/leisure activities  Lymphedema Life Impact Scale: Deferred today  Weight: Deferred today  TREATMENT AND OBJECTIVE DATA SUMMARY:   Patient/Family Education:      Educated in skin care: [x]   Skin care products  [x]   Hygiene  [x]  Prevention of cellulitis  [x]   Wound care     Instructed in self MLD: Written sequence given and demonstrated and verbalize to patient and . Instructed in don/doff of compression system:   [x]   Multi layer bandage (MLB) donning principles and wear precautions  [x]   Day garments-compreflex lite-transfer lower leg and foot pieces/Fitted with her custom knee highs today. [x]   Night garments-L below knee arabella garment with comprilans to serve as compression as she does not know where the power jacket for the arabella is located. Included washing and drying procedures for the Arabella and compreflex products. Educated in exercise:                                                Encouraged walking and active range with modifications. Therapeutic Activity 0 minutes   Treatment time: n/a  Functional Mobility: Mod I with transfers electric chair to commode. Fall risk: Yes with limited gait and use of the wheeled walker for very short household distances. Therapeutic Exercise/Procedure 10 minutes   Treatment time: 10:50am-11:00am  Voalte exercise program: Deferred today. Free exercises/ROM: Pt able to transfer with mod I and ambulate with bariatric wheeled walker in the clinic times 30 feet in the clinic with effort. Pt required min assistance to manage B LEs to and from supine on the mat table. Pt also instructed in deep abd breathing. Encouraged her to exercise daily to improve her condition.    Patient able to demonstrate basic seated exercises today during session. Patient has been given written handouts and a daily log, but she has not been able to be consistent with exercise in the home setting. Home program: Patient to perform daily to BID:  [x]   Skin care  [x]   Deep abdominal breathing  [x]   Exercise routine  [x]   Walking program  [x]   Rest in supine   [x]   Compression bandage by  as needed and at least once a week and use of velcro systems. [x]   Biac[employer] products to be changed daily by  with skin inspection and clean liners  If not utilizing her new custom knee highs.   []   Vasopneumatic device  []   Wound care  [x]   Self MLD  [x]   Bring supplies to each therapy visit  []   Purchase necessary supplies   [x]   Weight loss program  [x]   Complete weekly log and return to the next visit   Rationale: Exercise will increase the lymph angiomotoricity and tissue pressure of the skin and thus decrease swelling. Modalities 0 minutes   Treatment time: n/a  Vasopneumatic pump: Deferred at this time as pt with too many cardiac, respiratory and kidney issues. Manual Lymphatic Drainage (MLD)  75 minutes   Treatment time: 9:35am-10:50am  Area to decongest:    [x] LE             [x]  Right     [x]  Left      [x] Trunk   Sequence used and effectiveness:   [x] Primary sequence for lower extremities with trunk involvement. [x] Modifications were made to manual lymph drainage sequence to exclude cervical techniques secondary to patient's age    [x] Self MLD sequence/techniques/hand strokes. Pt was given the written handout with explanation and demonstration on previous visits. She reports that she completed the thigh and knee portion only but none of the trunk pre-work. She has been encouraged to perform trunk work first to aid in mobilizing fluid. Pt can verbalize each of the steps and in the order in which they should be done.    Skin/wound care/debridement: Pt with healing skin on her toes where she cut her skin while cutting her toe nails at home. Pt was again educated in these areas and encouraged to follow recommendations in skin care and prevention of cellulitis. Would benefit from podiatrist trimming her nails. Pt with dry skin primarily on her LEs but it is improving. Pt with staining of her lower leg section. Her feet and lower legs were washed with dove soap and eucerin lotion was used afterwards with application using MLD principles. Upper/Lower extremity compression: Compreflex lite transfer lower leg (4 sets) and include the compression liners but she also purchased the foot pieces as well. Patient's  good knowledge of use of the velcro garments. Pt now needs to get velcro shoes to use with the garments. Pt and  educated in the don/doff/precautions/care/appropriateness of these garments. Pt and  were reminded that use of the bandage should not be stopped but rather will be appropriate at least once a week unless needed more frequently. This does not seem to be a consistent form of compression that they are using in the home. Patient received her Custom Elvarex Knee highs and they were fitted today. Patient was encouraged to get closed toe knee highs due to her foot and toe swelling. She also had built in foam pad for ankle swelling. She overall is comfortable in her new products, but will need to see how she manages in them in the home setting. The length looks functional at this time, but will monitor when she returns. Patient able to leave the clinic today in her new custom knee highs. Kinesiotaping: Deferred   Girth/Volume measurement: Full volumes taken last visit to reveal that patient has lost 456 ml in the L LE and 230 ml in the R LE since evaluation on 3/30/18. Patient currently 8% difference compared to 10% difference on evaluation. TOTAL TREATMENT 85 mins     Circumferential Limb Measurements:  Deferred today for garment fitting. See above for previous volumes.      ASSESSMENT: Treatment effectiveness and tolerance: Patient received her new custom knee highs and they were fitted today. Patient will work with her new garments and see how they work for her in the home setting and return to see if any alterations/changes need to be made. Patient has not been fully compliant with her home program to date, but has made gains towards her goals. Will have patient return in one week to assess garments. Patient should be ready to discharge to the restorative phase of care once her compression garments have been assessed and she is comfortable with use of her new products. Progress toward goals: See goal section for details. All STGs met. Will continue to focus on the LTGs to prepare patient for discharge to the restorative phase of care. PLAN OF CARE:   Changes to the plan of care: Continue with current POC.    Frequency: []  2 times a week  [x]  Weekly  []  Biweekly  []  Monthly   Other:        Ramin Echeverria, PTA, CLT

## 2018-05-25 ENCOUNTER — HOSPITAL ENCOUNTER (OUTPATIENT)
Dept: PHYSICAL THERAPY | Age: 78
Discharge: HOME | End: 2018-05-25
Payer: MEDICARE

## 2018-05-25 PROCEDURE — 97140 MANUAL THERAPY 1/> REGIONS: CPT

## 2018-10-12 NOTE — PROGRESS NOTES
[facility] 
Physical Therapy Lymphedema Clinic [address] [place] [address], [address] Victorina Jeronimo  23. 
  
LYMPHEDEMA THERAPY 
VISIT: 8 
                       []                  Daily note                            [x]                 30 day Reassessment/progress note with update G codes for Medicare 
  
NAME: Chikis Olmedo DATE: 5/25/2018 GOALS Short term goals met: 4.  Patient's current compression DME will be evaluated for appropriateness. Pt's L custom BK arabella garment was donned and is missing the power jacket but pt informed that her  could apply comprilans to provide the added pressure. Pt's custom old flat knit knee high stockings were donned on the R.  Pt able to doff the garment with donning gloves and dycem on her foot. The fit of the garment is no longer good but pt was able to demonstrate understanding of the donning process and was able to assist with doffing using a piece of dycem on the floor. Goal met 4-3-18. 5.  Patient's new velcro compression DME will be evaluated for appropriateness once received by the patient. Pt brought in the new compreflex lite lower leg pieces (4) and the foot pieces (4) and the garment was donned on the R  LE. The fit around the foot and ankle was good but the garment is too big around mid calf superiorally. Pt will try and return 3 lower leg pieces for the size XL versus the XXL. Goal met 4-3-18. 
  
Short term goals to meet by 4-: 1.  Patient and caregiver, , will demonstrate knowledge of signs/symptoms of infections/cellulitis and be independent in skin care to prevent cellulitis. Patient and her  have knowledge of proper skin care and are aware of signs/symptoms of cellulitis. Demonstration of skin care and patient can verbalize understanding but does not follow through. Patient has secured appropriate skin care items for home use. Goal Met 4/26/18. 2.  Patient will demonstrate independence in lymphedema home program of therapeutic exercises to improve circulation and decongest limb to improve ADLs. Pt and  education in the ROM remedial lymphedema exercise routine to date. Pt can verbalize the specific exercises but needs many modifications to complete. Patient has written handouts to follow. Patient needs to be consistent with participation in the home setting. Goal Met 4/26/18 3.  Patient/ will be able to don a safe and effective knee high multi-layer bandages (MLB) in the home setting. Pt and  educated in don/doff/precautions of MLB and he was able to don safe and effective below knee MLB at home. Patient's  able to manage as needed in the home when not utilizing her velcro products. Measurements taken for custom knee highs and rechecked today for sizing and was sent to vendor for processing. Goal Met 4/26/18 
  
  
Long term goals Time frame: 6- 1.  Pt will show improvement in Lymphedema Life Impact Scale by decreasing impairment percentage to under 40% and thus allow improvement in patient's quality of life. Patient scored a 38 with a percent impairment of 56% this score was lower that last LLIS score, but the same as her evaluation score. 2.  Patient/caregiver will be independent with don/doff of compression system and use in order to prevent reaccumulation of fluid at discharge.  educated in the don/doff/care/precautions of the biacare lite transition set for the lower legs with the compreflex foot piece.  was able to don the L LE set of garments well in the home setting. Patient using Custom Elvarex knee highs and education on don/doff and care of the custom knee highs. Patient and patient's  educated on the wear and care of products. Patient's garments are fitting well over all, but she continues to struggle with concept of closed toe vs open toe.  Patient is able to show that she can independently don/doff garments and has the assistance of her  as needed. Goal Met 5/25/18 3.  Patient will purchase a pair of shoes that will fit and can be worn with her compression garments. Patient has issues with finding proper foot wear. Needs to continue to look for a more supportive shoes. Patient tends to wear slipper socks to the clinic often and needs supportive shoes to address her foot/ankle swelling. Patient does not like to wear shoes so this continues to be an issue. Would benefit from a supportive tennis shoe that has good coverage of her entire foot for best support/compression. 4.  Pt/caregiver will be independent in self-MLD and show stable limb volumes showing decongestion and pt. ready for transition to independent restorative phase of lymphedema therapy. Pt has a understanding of the self MLD sequence and has the written program to follow at home, but needs to be compliant with performance. Full volumes taken on 5/4/18 revealed that patient had lost 456 ml in the L LE and 230 ml in the R LE since evaluation on 3/30/18. Patient currently 8% difference compared to 10% difference on evaluation. Patient has made improvements in reduction of her swelling. Patient did not have volumes assessed today as she was focused on addressing her concerns with her garments. Girths taken today. See measurement section of note for details. Patient has met this goal as she as reduced volumes in her lower legs with use of compression and understands her Self MLD packet. Goal Met 5/25/18  
  
SUBJECTIVE REPORT: Patient arrived today very irritable and reports that she is still not sure that she likes the closed toe aspect of her new compression garments. Garments continue to look fine and functional for patient, but decided to take new measurements for patient today if she decides to return them and have open toe garments made.  Patient stated that she needed to work with them a little longer to make that decision.  present today and very supportive. Pain:0/10 Gait:   
[x]  Modified Independent gait with bariatric wheeled walker in the clinic for up to 15 feet. Limited ambulation in the home. ADLs:  Pt needs assistance from her spouse for management of the compression DME-bandaging. Treatment Response:   
[x]   Patient reviewed packet received at evaluation 
[]   Patient completed home program as prescribed 
[x]   Patient not compliant with home program to date as she did not complete the ROM exercise routine in its entirety, self MLD for her trunk, try to increase gait, or change her posture from prolong sitting. Patient did wear her new compression garments as requested. Patient has not brought in her daily logs as requested. [x]   Patient has been not been wearing custom knee highs, but not daily. Encouraged her to do this before we decide on a remake. Function:  
[x]   Patient will require assistance with completion of home program in the area of compression []   ADLs are requiring less assistance  
[]   Patient able to return to work/leisure activities Lymphedema Life Impact Scale: Patient scored a 45 with a percent impairment of 56% this score was lower that last LLIS score, but the same as her evaluation score. Weight: Deferred today TREATMENT AND OBJECTIVE DATA SUMMARY:  
Patient/Family Education:    
  
Educated in skin care: [x]   Skin care products [x]   Hygiene [x]  Prevention of cellulitis 
[]   Wound care  
  
Instructed in self MLD: Written sequence given and demonstrated and verbalize to patient and .  
  
Instructed in don/doff of compression system:   
[x]   Multi layer bandage (MLB) donning principles and wear precautions [x]   Day garments-compreflex lite-transfer lower leg and foot pieces/Beginning to wear her custom knee highs.  Patient still has not determined if she likes the closed toe garments. [x]   Night garments- L below knee arabella garment with comprilans to serve as compression as she does not know where the power jacket for the arabella is located. Included washing and drying procedures for the Arabella and compreflex products. Educated in exercise:                                               Encouraged walking and active range with modifications. 
  
  
  
Therapeutic Activity 0 minutes Treatment time: n/a Functional Mobility: Mod I with transfers electric chair to commode. Fall risk: Yes with limited gait and use of the wheeled walker for very short household distances.  
  
Therapeutic Exercise/Procedure 0 minutes Treatment time: 0 Maria Fernanda ball exercise program: Deferred today. Free exercises/ROM: Patient also instructed in deep abd breathing. Encouraged her to exercise daily to improve her condition. Patient has been given written handouts and a daily log, but she has not been able to be consistent with exercise in the home setting. Home program: Patient to perform daily to BID: 
[x]   Skin care [x]   Deep abdominal breathing 
[x]   Exercise routine [x]   Walking program 
[x]   Rest in supine  
[x]   Compression bandage by  as needed and at least once a week and use of custom knee highs vs velcro systems. [x]   Biacare products to be changed daily by  with skin inspection and clean liners If not utilizing her new custom knee highs vs custom knee highs 
[]   Vasopneumatic device 
[]   Wound care [x]   Self MLD [x]   Bring supplies to each therapy visit 
[]   Purchase necessary supplies [x]   Weight loss program 
[x]   Complete weekly log and return to the next visit Rationale: Exercise will increase the lymph angiomotoricity and tissue pressure of the skin and thus decrease swelling.  
  
Modalities 0 minutes Treatment time: n/a Vasopneumatic pump: Deferred at this time as pt with too many cardiac, respiratory and kidney issues.  
  
Manual Lymphatic Drainage (MLD)  65 minutes Treatment time:12:25pm-1:30pm 
Area to decongest:    
[x] LE   
         [x]  Right     [x]  Left      [x] Trunk Sequence used and effectiveness:   
[x] Primary sequence for lower extremities with trunk involvement. 
  
[x] Modifications were made to manual lymph drainage sequence to exclude cervical techniques secondary to patient's age 
  
[x] Self MLD sequence/techniques/hand strokes. Pt was given the written handout with explanation and demonstration on previous visits. She reports that she completed the thigh and knee portion only but none of the trunk pre-work. She has been encouraged to perform trunk work first to aid in mobilizing fluid. Pt can verbalize each of the steps and in the order in which they should be done. Skin/wound care/debridement: Pt with healing skin on her toes where she cut her skin while cutting her toe nails at home. Pt was again educated in these areas and encouraged to follow recommendations in skin care and prevention of cellulitis. Would benefit from podiatrist trimming her nails. Pt with dry skin primarily on her LEs but it is improving. Pt with staining of her lower leg section. Upper/Lower extremity compression: Compreflex lite transfer lower leg (4 sets) and include the compression liners but she also purchased the foot pieces as well. Patient's  good knowledge of use of the velcro garments. Pt now needs to get velcro shoes to use with the garments. Pt and  educated in the don/doff/precautions/care/appropriateness of these garments. 
   
Pt and  were reminded that use of the bandage should not be stopped but rather will be appropriate at least once a week unless needed more frequently. This does not seem to be a consistent form of compression that they are using in the home.    
  
Patient wore in her Custom Elvarex Knee highs today.  She continues to report that she is not sure about the closed toe feature. Patient was encouraged to get closed toe knee highs due to her foot and toe swelling. She also had built in foam pad for ankle swelling. She overall is comfortable in her new products, but is unsure if she likes the features of the knee highs. Patient cancelled her 5/21/18 visit and returns today and has not worn the garments enough to make a decision. New measurements were taken to make an open toe garment for patient and remove foam pad at ankle if needed. The length looks functional at this time, but lengths seem dependent on how she is donning the garments. Patient very irritable today with session and information collected if garments will need a remake and she will contact us by phone how she would like to proceed in the next couple of weeks before the window to remake the garments is closed. Patient able to leave the clinic today in her new custom knee highs. Kinesiotaping: Deferred Girth/Volume measurement: Full volumes taken on 5/4/18 revealed that patient had lost 456 ml in the L LE and 230 ml in the R LE since evaluation on 3/30/18. Patient currently 8% difference compared to 10% difference on evaluation. Girths taken today. See below.   
  
TOTAL TREATMENT 85 mins  
  
Circumferential Limb Measurements: 
Affected Side: B LE Left (cm) Right (cm)  
5th Tuberosity 25.9 
  26.5 Ankle 37.5 
  37 Calf 38 
  38.5 ASSESSMENT:  
Treatment effectiveness and tolerance: Patient has been working with her new custom knee highs, but not wearing them consistently to make a decision on overall fit and any changes that she wants to make. She is trying to adjust the closed toes as in the past she had open toes. It has been recommended that she have closed toe garments due to the significance of our foot swelling.  Patient will need to work with her new garments everyday for the next couple of weeks to make her final decision on garment to decide if she would like to make changes. If not changes are needed patient can be discharged to the restorative phase of care via phone and be scheduled for 6 month follow up. Patient has not been fully compliant with her home program to date, but has made gains towards her goals. Remaining goals are LTG 1 and LTG 3, which she may not be able to meet at this time. Will have patient contact clinic in the next two weeks to decide if she would like to have changes made to the garment or work if she is doing well with them we can discharge her to the restorative phase. Progress toward goals: See goal section for details. All STGs met. Patient able to meet LTG 2+4. Will continue to focus on the LTGs to prepare patient for discharge to the restorative phase of care.   
  
   
PLAN OF CARE:  
Changes to the plan of care: Continue with current POC. Frequency: [] 2 times a week 
[] Weekly [] Biweekly [] Monthly 
[x] Contact clinic within the next couple of weeks to make a decision on garments and how to proceed. Other: Measurements taken today if patient decides to go with an open toe garment instead of her current closed toe garment which has not been recommended due to her foot/toe swelling.  
  
 
In compliance with CMSs Claims Based Outcome Reporting, the following G-code set was chosen for this patient based on their primary functional limitation being treated: The outcome measure chosen to determine the severity of the functional limitation was the Lymphedema Life Impact Scale with a score of 38 which was correlated with a 56% impairment scale. ? Other PT/OT Primary Functional Limitations:  
  - CURRENT STATUS: CK - 40%-59% impaired, limited or restricted  - GOAL STATUS: CJ - 20%-39% impaired, limited or restricted Ramin C Patch, PTA, CLT [employer], PT, [employer]

## 2018-12-03 ENCOUNTER — APPOINTMENT (OUTPATIENT)
Dept: PHYSICAL THERAPY | Age: 78
End: 2018-12-03

## 2018-12-26 NOTE — PROGRESS NOTES
[facility]  [facility]hedema [facility]  [address] [facility], [address] [place] [address], [facility] 22.    Lymphedema Therapy      6/8/2018:  Nia Chavarria contacted to check on the fit of her custom compression knee highs as last visit she needed more time to work with them before making a decision if she needed a remake. She has decided to keep her current garments and return in 6 months for follow up/or when she is ready to order new compression garments. . Patient's G-Codes were obtained last visit 5/25/18. Patient was able to meet all goals with the exception of LTG1+3. Will reassess patient when she returns to the clinic for follow up.      Ramin Echeverria, PTA, CLT

## 2019-05-30 ENCOUNTER — HOSPITAL ENCOUNTER (EMERGENCY)
Age: 79
Discharge: HOME | End: 2019-05-30
Attending: EMERGENCY MEDICINE
Payer: MEDICARE

## 2019-05-30 VITALS
DIASTOLIC BLOOD PRESSURE: 109 MMHG | HEART RATE: 112 BPM | TEMPERATURE: 97.9 F | SYSTOLIC BLOOD PRESSURE: 160 MMHG | OXYGEN SATURATION: 97 % | RESPIRATION RATE: 20 BRPM

## 2019-05-30 DIAGNOSIS — R04.0 ACUTE ANTERIOR EPISTAXIS: Primary | ICD-10-CM

## 2019-05-30 LAB
ANION GAP SERPL CALC-SCNC: 6 MMOL/L (ref 5–15)
BASOPHILS # BLD: 0.1 K/UL (ref 0–0.1)
BASOPHILS NFR BLD: 1 % (ref 0–1)
BUN SERPL-MCNC: 26 MG/DL (ref 6–20)
BUN/CREAT SERPL: 19 (ref 12–20)
CALCIUM SERPL-MCNC: 8.9 MG/DL (ref 8.5–10.1)
CHLORIDE SERPL-SCNC: 106 MMOL/L (ref 97–108)
CO2 SERPL-SCNC: 26 MMOL/L (ref 21–32)
CREAT SERPL-MCNC: 1.37 MG/DL (ref 0.55–1.02)
DIFFERENTIAL METHOD BLD: ABNORMAL
EOSINOPHIL # BLD: 0.2 K/UL (ref 0–0.4)
EOSINOPHIL NFR BLD: 2 % (ref 0–7)
ERYTHROCYTE [DISTWIDTH] IN BLOOD BY AUTOMATED COUNT: 14.5 % (ref 11.5–14.5)
GLUCOSE SERPL-MCNC: 113 MG/DL (ref 65–100)
HCT VFR BLD AUTO: 45.5 % (ref 35–47)
HGB BLD-MCNC: 14.1 G/DL (ref 11.5–16)
IMM GRANULOCYTES # BLD AUTO: 0.1 K/UL (ref 0–0.04)
IMM GRANULOCYTES NFR BLD AUTO: 0 % (ref 0–0.5)
INR PPP: 1.8 (ref 0.9–1.1)
LYMPHOCYTES # BLD: 1.5 K/UL (ref 0.8–3.5)
LYMPHOCYTES NFR BLD: 13 % (ref 12–49)
MCH RBC QN AUTO: 30.7 PG (ref 26–34)
MCHC RBC AUTO-ENTMCNC: 31 G/DL (ref 30–36.5)
MCV RBC AUTO: 98.9 FL (ref 80–99)
MONOCYTES # BLD: 1 K/UL (ref 0–1)
MONOCYTES NFR BLD: 8 % (ref 5–13)
NEUTS SEG # BLD: 9.5 K/UL (ref 1.8–8)
NEUTS SEG NFR BLD: 76 % (ref 32–75)
NRBC # BLD: 0 K/UL (ref 0–0.01)
NRBC BLD-RTO: 0 PER 100 WBC
PLATELET # BLD AUTO: 244 K/UL (ref 150–400)
PMV BLD AUTO: 10.2 FL (ref 8.9–12.9)
POTASSIUM SERPL-SCNC: 5 MMOL/L (ref 3.5–5.1)
PROTHROMBIN TIME: 17.3 SEC (ref 9–11.1)
RBC # BLD AUTO: 4.6 M/UL (ref 3.8–5.2)
SODIUM SERPL-SCNC: 138 MMOL/L (ref 136–145)
WBC # BLD AUTO: 12.4 K/UL (ref 3.6–11)

## 2019-05-30 PROCEDURE — 75810000284 HC CNTRL NASAL HEMORHRAGE SIMPLE

## 2019-05-30 PROCEDURE — 85610 PROTHROMBIN TIME: CPT

## 2019-05-30 PROCEDURE — 80048 BASIC METABOLIC PNL TOTAL CA: CPT

## 2019-05-30 PROCEDURE — 77030011649 HC PK NSL RHNO S&N -B

## 2019-05-30 PROCEDURE — 74011250637 HC RX REV CODE- 250/637: Performed by: EMERGENCY MEDICINE

## 2019-05-30 PROCEDURE — 36415 COLL VENOUS BLD VENIPUNCTURE: CPT

## 2019-05-30 PROCEDURE — 85025 COMPLETE CBC W/AUTO DIFF WBC: CPT

## 2019-05-30 PROCEDURE — 74011250637 HC RX REV CODE- 250/637

## 2019-05-30 PROCEDURE — 99285 EMERGENCY DEPT VISIT HI MDM: CPT

## 2019-05-30 RX ORDER — CARVEDILOL 12.5 MG/1
12.5 TABLET ORAL
Status: COMPLETED | OUTPATIENT
Start: 2019-05-30 | End: 2019-05-30

## 2019-05-30 RX ORDER — OXYMETAZOLINE HCL 0.05 %
2 SPRAY, NON-AEROSOL (ML) NASAL
Status: DISCONTINUED | OUTPATIENT
Start: 2019-05-30 | End: 2019-05-30 | Stop reason: HOSPADM

## 2019-05-30 RX ORDER — OXYMETAZOLINE HCL 0.05 %
SPRAY, NON-AEROSOL (ML) NASAL
Status: COMPLETED
Start: 2019-05-30 | End: 2019-05-30

## 2019-05-30 RX ADMIN — CARVEDILOL 12.5 MG: 12.5 TABLET, FILM COATED ORAL at 02:51

## 2019-05-30 RX ADMIN — OXYMETAZOLINE HYDROCHLORIDE: 0.05 SPRAY NASAL at 00:38

## 2019-05-30 RX ADMIN — Medication: at 00:38

## 2019-05-30 NOTE — ED NOTES
Discharge instructions given to pt by MD and RN . Pt has been given counseling on med use and verbalizes  understanding . IV discontinued . Pt wheeled  off unit with no signs of distress.

## 2019-05-30 NOTE — ED PROVIDER NOTES
67 y.o. female with past medical history significant for COPD, CKD, hypothyroid, CHF, a-fib, GERD, arthritis, dyspepsia, obesity, lymph edema, breast cancer, pleural effusion, and HTN who presents from home with chief complaint of epistaxis. Pt complains of epistaxis, left nare more than right, that started at 10:15 PM tonight. Pt states she has tried afrin at home with no relief. Pt is noted to be on warfarin. Per EMS, they went through 10 8x10 trauma pads en route. Pt states she has a hx of epistaxis and has had rhino rockets before. There are no other acute medical concerns at this time. Social hx: Never Smoker. Denies EtOH Use. PCP: Enrique Martin MD    Note written by Deep Dumont. Woodie Scheuermann, as dictated by William Ch MD 12:40 AM      The history is provided by the patient.         Past Medical History:   Diagnosis Date    Arrhythmia     A Fib    Arthritis     Atrial fibrillation (HCC)     Cancer (HCC)     left breast cancer    Cardiomegaly     CKD (chronic kidney disease) stage 3, GFR 30-59 ml/min (HCC)     judith    Clostridium difficile infection     Congestive heart failure, unspecified     COPD     Diarrhea     /constipation    Dyspepsia and other specified disorders of function of stomach     Elevated antinuclear antibody (MARYAM) level     Endocrine disease     hypothyroidism    GERD (gastroesophageal reflux disease)     Hearing loss     Heart failure (HCC)     Hypertension     Hypertensive heart disease with heart failure (HCC)     diastolic dysfunction    Joint pain     Leg swelling     Long term (current) use of anticoagulants     Lymphedema     Morbid obesity (HCC)     Obesity, unspecified     Other ill-defined conditions(799.89)     lymph edema    Pleural effusion     s/p VATS    Shortness of breath     Thyroid disease     Unspecified sleep apnea     Does not use machine       Past Surgical History:   Procedure Laterality Date    BREAST SURGERY PROCEDURE UNLISTED  13    LEFT BREAST MASTECTOMY SENTINEL NODE BIOPSY      HX BREAST BIOPSY      left    HX BREAST BIOPSY      fibroids removed right breast    HX  SECTION      times 2    HX KNEE ARTHROSCOPY      left knee     HX MODIFIED RADICAL MASTECTOMY  2013    BREAST MASTECTOMY MODIFIED RADICAL performed by Bill Ramirez MD at [facility] MAIN OR    HX OTHER SURGICAL      pleural effusion    HX RHINOPLASTY      HX DOMI AND BSO      HX TONSILLECTOMY           Family History:   Problem Relation Age of Onset    Lung Disease Mother         Emphysema    Heart Disease Mother     Hypertension Mother     Cancer Father         Lung Cancer    Heart Disease Father     Hypertension Father     Cancer Paternal Aunt         colon       Social History     Socioeconomic History    Marital status:      Spouse name: Not on file    Number of children: Not on file    Years of education: Not on file    Highest education level: Not on file   Occupational History    Not on file   Social Needs    Financial resource strain: Not on file    Food insecurity:     Worry: Not on file     Inability: Not on file    Transportation needs:     Medical: Not on file     Non-medical: Not on file   Tobacco Use    Smoking status: Never Smoker    Smokeless tobacco: Never Used   Substance and Sexual Activity    Alcohol use: No    Drug use: No     Types: Prescription, OTC    Sexual activity: Not on file   Lifestyle    Physical activity:     Days per week: Not on file     Minutes per session: Not on file    Stress: Not on file   Relationships    Social connections:     Talks on phone: Not on file     Gets together: Not on file     Attends Druze service: Not on file     Active member of club or organization: Not on file     Attends meetings of clubs or organizations: Not on file     Relationship status: Not on file    Intimate partner violence:     Fear of current or ex partner: Not on file Emotionally abused: Not on file     Physically abused: Not on file     Forced sexual activity: Not on file   Other Topics Concern    Not on file   Social History Narrative    Not on file         ALLERGIES: Latex; Grass pollen-bermuda, standard; and Sulfa (sulfonamide antibiotics)    Review of Systems   Constitutional: Negative for appetite change and fever. HENT: Positive for nosebleeds. Respiratory: Negative for cough and shortness of breath. Cardiovascular: Negative for chest pain. Gastrointestinal: Negative for abdominal pain, diarrhea, nausea and vomiting. Genitourinary: Negative for dysuria. Musculoskeletal: Negative for arthralgias. Neurological: Negative for dizziness and light-headedness. All other systems reviewed and are negative. Vitals:    05/30/19 0130 05/30/19 0200 05/30/19 0215 05/30/19 0300   BP: (!) 164/106 (!) 180/115 (!) 176/133 (!) 160/109   Pulse:    (!) 112   Resp:    20   Temp:    97.9 °F (36.6 °C)   SpO2: 98% 97%  97%            Physical Exam   Constitutional: She appears well-developed and well-nourished. No distress. HENT:   Head: Normocephalic and atraumatic. Brisk left nare hemorrhage with lower volume right nare hemorrhage. Not controlled by direct pressure. Eyes: Conjunctivae are normal.   Neck: Neck supple. Cardiovascular: Normal rate, regular rhythm and normal heart sounds. Pulmonary/Chest: Effort normal and breath sounds normal. No respiratory distress. Abdominal: She exhibits no distension. Musculoskeletal: Normal range of motion. She exhibits no deformity. Neurological: She is alert. No cranial nerve deficit. Skin: Skin is warm and dry. Psychiatric: Her behavior is normal.   Nursing note and vitals reviewed. Note written by Soheila Kennedy. Tobin Bray, as dictated by Raymond Miller MD 12:45 AM      MDM     67 y.o. female presents with bilateral epistaxis which is brisk from the left nare and lower volume from the right.  Direct pressure and afrin at home provided no relief. Pressure here produced continued bleeding into the oropharynx so rhino rocket was placed and bleeding stopped from left side but continued at a lower volume on right so Merocel was placed for hemostasis. Pt tolerated well with some discomfort. Needs f/u with ENT who she has seen previously for TM injections and nosebleeds. I discussed holding warfarin for a few days until site of bleeding has a chance to heal properly but no indication for reversal. Discussed sitting upright while sleeping d/t bilateral packing materials. Plan to follow up with PCP as needed and return precautions discussed for worsening or new concerning symptoms.      Procedures

## 2019-05-30 NOTE — ED TRIAGE NOTES
Triage note : pt arrives from home via ems with c/o epistaxis that started around 2215 . Pt tried afrin at home , and has had theses nosebleeds previously . Pt is on warfarin . Pt hashx of afib .

## 2019-09-24 ENCOUNTER — HOSPITAL ENCOUNTER (EMERGENCY)
Age: 79
Discharge: HOME | End: 2019-09-24
Attending: EMERGENCY MEDICINE | Admitting: EMERGENCY MEDICINE
Payer: MEDICARE

## 2019-09-24 ENCOUNTER — APPOINTMENT (OUTPATIENT)
Dept: GENERAL RADIOLOGY | Age: 79
End: 2019-09-24
Attending: EMERGENCY MEDICINE
Payer: MEDICARE

## 2019-09-24 ENCOUNTER — APPOINTMENT (OUTPATIENT)
Dept: VASCULAR SURGERY | Age: 79
End: 2019-09-24
Attending: EMERGENCY MEDICINE
Payer: MEDICARE

## 2019-09-24 VITALS
OXYGEN SATURATION: 98 % | SYSTOLIC BLOOD PRESSURE: 98 MMHG | TEMPERATURE: 97.7 F | HEART RATE: 89 BPM | RESPIRATION RATE: 18 BRPM | DIASTOLIC BLOOD PRESSURE: 65 MMHG

## 2019-09-24 DIAGNOSIS — R06.02 SOB (SHORTNESS OF BREATH): ICD-10-CM

## 2019-09-24 DIAGNOSIS — M25.561 ARTHRALGIA OF RIGHT LOWER LEG: Primary | ICD-10-CM

## 2019-09-24 LAB
ANION GAP SERPL CALC-SCNC: 5 MMOL/L (ref 5–15)
BASOPHILS # BLD: 0 K/UL (ref 0–0.1)
BASOPHILS NFR BLD: 1 % (ref 0–1)
BNP SERPL-MCNC: 4912 PG/ML
BUN SERPL-MCNC: 24 MG/DL (ref 6–20)
BUN/CREAT SERPL: 16 (ref 12–20)
CALCIUM SERPL-MCNC: 9.1 MG/DL (ref 8.5–10.1)
CHLORIDE SERPL-SCNC: 107 MMOL/L (ref 97–108)
CO2 SERPL-SCNC: 29 MMOL/L (ref 21–32)
CREAT SERPL-MCNC: 1.47 MG/DL (ref 0.55–1.02)
DIFFERENTIAL METHOD BLD: ABNORMAL
EOSINOPHIL # BLD: 0.1 K/UL (ref 0–0.4)
EOSINOPHIL NFR BLD: 2 % (ref 0–7)
ERYTHROCYTE [DISTWIDTH] IN BLOOD BY AUTOMATED COUNT: 16.2 % (ref 11.5–14.5)
GLUCOSE SERPL-MCNC: 95 MG/DL (ref 65–100)
HCT VFR BLD AUTO: 41 % (ref 35–47)
HGB BLD-MCNC: 12.5 G/DL (ref 11.5–16)
IMM GRANULOCYTES # BLD AUTO: 0 K/UL (ref 0–0.04)
IMM GRANULOCYTES NFR BLD AUTO: 0 % (ref 0–0.5)
LYMPHOCYTES # BLD: 0.8 K/UL (ref 0.8–3.5)
LYMPHOCYTES NFR BLD: 13 % (ref 12–49)
MCH RBC QN AUTO: 29.1 PG (ref 26–34)
MCHC RBC AUTO-ENTMCNC: 30.5 G/DL (ref 30–36.5)
MCV RBC AUTO: 95.6 FL (ref 80–99)
MONOCYTES # BLD: 0.6 K/UL (ref 0–1)
MONOCYTES NFR BLD: 9 % (ref 5–13)
NEUTS SEG # BLD: 5.1 K/UL (ref 1.8–8)
NEUTS SEG NFR BLD: 75 % (ref 32–75)
NRBC # BLD: 0 K/UL (ref 0–0.01)
NRBC BLD-RTO: 0 PER 100 WBC
PLATELET # BLD AUTO: 190 K/UL (ref 150–400)
PMV BLD AUTO: 11.1 FL (ref 8.9–12.9)
POTASSIUM SERPL-SCNC: 5.1 MMOL/L (ref 3.5–5.1)
RBC # BLD AUTO: 4.29 M/UL (ref 3.8–5.2)
SODIUM SERPL-SCNC: 141 MMOL/L (ref 136–145)
WBC # BLD AUTO: 6.7 K/UL (ref 3.6–11)

## 2019-09-24 PROCEDURE — 99284 EMERGENCY DEPT VISIT MOD MDM: CPT

## 2019-09-24 PROCEDURE — 83880 ASSAY OF NATRIURETIC PEPTIDE: CPT

## 2019-09-24 PROCEDURE — 93971 EXTREMITY STUDY: CPT

## 2019-09-24 PROCEDURE — 36415 COLL VENOUS BLD VENIPUNCTURE: CPT

## 2019-09-24 PROCEDURE — 85025 COMPLETE CBC W/AUTO DIFF WBC: CPT

## 2019-09-24 PROCEDURE — 80048 BASIC METABOLIC PNL TOTAL CA: CPT

## 2019-09-24 PROCEDURE — 71046 X-RAY EXAM CHEST 2 VIEWS: CPT

## 2019-09-24 NOTE — PROGRESS NOTES
4:19 PM  Change of shift. Care of patient taken over from Dr [facility]; H&P reviewed, bedside handoff complete. Awaiting 'Labs/ Imaging/ ambulation of Pt';     5:46 PM  Pt at radiology;     6:34 PM  Awaiting results/ Pt has no c/o;     7:52 PM  Ash Elizabeth's  results have been reviewed with her. She has been counseled regarding her diagnosis. She verbally conveys understanding and agreement of the signs, symptoms, diagnosis, treatment and prognosis and additionally agrees to Call/ Arrange follow up as recommended with Dr. Claire Verduzco MD in 24 - 48 hours. She also agrees with the care-plan and conveys that all of her questions have been answered. I have also put together some discharge instructions for her that include: 1) educational information regarding their diagnosis, 2) how to care for their diagnosis at home, as well a 3) list of reasons why they would want to return to the ED prior to their follow-up appointment, should their condition change or for concerns.

## 2019-09-24 NOTE — ED TRIAGE NOTES
Pt arrives c /o bilateral lower extremity pain. Pt has history of same and treated for same at [facility]. Pt arrives with severe swelling of bilateral extremities with redness and dry skin noted. Pt denies chest pain, SOB, dizziness or other s/s. Pt is AOx4 in no apparent distress.

## 2019-09-24 NOTE — ED PROVIDER NOTES
67 y.o. female with past medical history significant for COPD, CKD, a-fib, GERD, lymph edema, breast cancer, CHF, hypothyroidism, and obesity who presents from home via EMS with chief complaint of leg pain. Pt complains of right leg pain for the past 2 weeks that has progressively worsened. Pt notes she has wounds on her legs that are draining. Pt states she is taking norco for the pain with some relief. Pt states she sees the lymph edema clinic. Pt notes she is short of breath at baseline. Pt states she is not ambulatory, but could stand until recently due to pain. There are no other acute medical concerns at this time. Social hx: Never Smoker. Denies EtOH Use. PCP: Yuly Ramirez MD    Note written by Yakelin Neal. Iam Rosenthal, as dictated by Rafy Gold MD 1:51 PM      The history is provided by the patient and the spouse.         Past Medical History:   Diagnosis Date    Arrhythmia     A Fib    Arthritis     Atrial fibrillation (HCC)     Cancer (HCC)     left breast cancer    Cardiomegaly     CKD (chronic kidney disease) stage 3, GFR 30-59 ml/min (HCC)     judith    Clostridium difficile infection     Congestive heart failure, unspecified     COPD     Diarrhea     /constipation    Dyspepsia and other specified disorders of function of stomach     Elevated antinuclear antibody (MARYAM) level     Endocrine disease     hypothyroidism    GERD (gastroesophageal reflux disease)     Hearing loss     Heart failure (HCC)     Hypertension     Hypertensive heart disease with heart failure (HCC)     diastolic dysfunction    Joint pain     Leg swelling     Long term (current) use of anticoagulants     Lymphedema     Morbid obesity (HCC)     Obesity, unspecified     Other ill-defined conditions(799.89)     lymph edema    Pleural effusion     s/p VATS    Shortness of breath     Thyroid disease     Unspecified sleep apnea     Does not use machine       Past Surgical History:   Procedure Laterality Date    BREAST SURGERY PROCEDURE UNLISTED  13    LEFT BREAST MASTECTOMY SENTINEL NODE BIOPSY      HX BREAST BIOPSY      left    HX BREAST BIOPSY      fibroids removed right breast    HX  SECTION      times 2    HX KNEE ARTHROSCOPY      left knee     HX MODIFIED RADICAL MASTECTOMY  2013    BREAST MASTECTOMY MODIFIED RADICAL performed by Kaylah Rodriguez MD at [facility] MAIN OR    HX OTHER SURGICAL      pleural effusion    HX RHINOPLASTY      HX DOMI AND BSO      HX TONSILLECTOMY           Family History:   Problem Relation Age of Onset    Lung Disease Mother         Emphysema    Heart Disease Mother     Hypertension Mother     Cancer Father         Lung Cancer    Heart Disease Father     Hypertension Father     Cancer Paternal Aunt         colon       Social History     Socioeconomic History    Marital status:      Spouse name: Not on file    Number of children: Not on file    Years of education: Not on file    Highest education level: Not on file   Occupational History    Not on file   Social Needs    Financial resource strain: Not on file    Food insecurity:     Worry: Not on file     Inability: Not on file    Transportation needs:     Medical: Not on file     Non-medical: Not on file   Tobacco Use    Smoking status: Never Smoker    Smokeless tobacco: Never Used   Substance and Sexual Activity    Alcohol use: No    Drug use: No     Types: Prescription, OTC    Sexual activity: Not on file   Lifestyle    Physical activity:     Days per week: Not on file     Minutes per session: Not on file    Stress: Not on file   Relationships    Social connections:     Talks on phone: Not on file     Gets together: Not on file     Attends Congregation service: Not on file     Active member of club or organization: Not on file     Attends meetings of clubs or organizations: Not on file     Relationship status: Not on file    Intimate partner violence:     Fear of current or ex partner: Not on file     Emotionally abused: Not on file     Physically abused: Not on file     Forced sexual activity: Not on file   Other Topics Concern    Not on file   Social History Narrative    Not on file         ALLERGIES: Latex; Grass pollen-bermuda, standard; and Sulfa (sulfonamide antibiotics)    Review of Systems   Constitutional: Negative for chills and fever. Eyes: Negative for visual disturbance. Respiratory: Positive for shortness of breath (Baseline. ). Cardiovascular: Negative for chest pain. Gastrointestinal: Negative for abdominal pain, nausea and vomiting. Genitourinary: Negative for dysuria and hematuria. Musculoskeletal: Positive for arthralgias and myalgias. Neurological: Negative for dizziness, light-headedness and headaches. All other systems reviewed and are negative. Visit Vitals  /71 (BP 1 Location: Right arm, BP Patient Position: At rest)   Pulse 88   Temp 98.4 °F (36.9 °C)   Resp 18   SpO2 96%         Physical Exam   Constitutional: She is oriented to person, place, and time. She appears well-developed and well-nourished. No distress. HENT:   Mouth/Throat: Oropharynx is clear and moist.   Eyes: Conjunctivae are normal.   Neck: Normal range of motion. No JVD present. Cardiovascular: Normal rate and regular rhythm. Pulmonary/Chest: Effort normal and breath sounds normal.   Abdominal: Soft. There is tenderness. Musculoskeletal: She exhibits edema and tenderness (lower legs). Neurological: She is alert and oriented to person, place, and time. Skin: Skin is warm. Capillary refill takes less than 2 seconds. No rash noted. She is not diaphoretic. Lymphedema and swelling noted  Wounds to left anterior leg   Psychiatric: She has a normal mood and affect. Vitals reviewed. MDM       Procedures      4:13 PM  Change of shift. Care of patient signed over to Dr. Aicha Larkin. Bedside handoff complete.  Awaiting labs, venous duplex, CXR, and labs, PT eval and dispo.     Pauly Villarreal MD

## 2019-09-24 NOTE — DISCHARGE INSTRUCTIONS
Patient Education        Musculoskeletal Pain: Care Instructions  Your Care Instructions    Different problems with the bones, muscles, nerves, ligaments, and tendons in the body can cause pain. One or more areas of your body may ache or burn. Or they may feel tired, stiff, or sore. The medical term for this type of pain is musculoskeletal pain. It can have many different causes. Sometimes the pain is caused by an injury such as a strain or sprain. Or you might have pain from using one part of your body in the same way over and over again. This is called overuse. In some cases, the cause of the pain is another health problem such as arthritis or fibromyalgia. The doctor will examine you and ask you questions about your health to help find the cause of your pain. Blood tests or imaging tests like an X-ray may also be helpful. But sometimes doctors can't find a cause of the pain. Treatment depends on your symptoms and the cause of the pain, if known. The doctor has checked you carefully, but problems can develop later. If you notice any problems or new symptoms, get medical treatment right away. Follow-up care is a key part of your treatment and safety. Be sure to make and go to all appointments, and call your doctor if you are having problems. It's also a good idea to know your test results and keep a list of the medicines you take. How can you care for yourself at home? · Rest until you feel better. · Do not do anything that makes the pain worse. Return to exercise gradually if you feel better and your doctor says it's okay. · Be safe with medicines. Read and follow all instructions on the label. ? If the doctor gave you a prescription medicine for pain, take it as prescribed. ? If you are not taking a prescription pain medicine, ask your doctor if you can take an over-the-counter medicine. · Put ice or a cold pack on the area for 10 to 20 minutes at a time to ease pain.  Put a thin cloth between the ice and your skin. When should you call for help? Call your doctor now or seek immediate medical care if:    · You have new pain, or your pain gets worse.     · You have new symptoms such as a fever, a rash, or chills.    Watch closely for changes in your health, and be sure to contact your doctor if:    · You do not get better as expected. Where can you learn more? Go to http://ke-tawanna.info/. Enter Z932 in the search box to learn more about \"Musculoskeletal Pain: Care Instructions. \"  Current as of: March 28, 2019  Content Version: 12.2  © 5042-6662 [employer]. Care instructions adapted under license by [employer] (which disclaims liability or warranty for this information). If you have questions about a medical condition or this instruction, always ask your healthcare professional. [place] [address] any warranty or liability for your use of this information. Patient Education        Leg and Ankle Edema: Care Instructions  Your Care Instructions  Swelling in the legs, ankles, and feet is called edema. It is common after you sit or stand for a while. Long plane flights or car rides often cause swelling in the legs and feet. You may also have swelling if you have to stand for long periods of time at your job. Problems with the veins in the legs (varicose veins) and changes in hormones can also cause swelling. Sometimes the swelling in the ankles and feet is caused by a more serious problem, such as heart failure, infection, blood clots, or liver or kidney disease. Follow-up care is a key part of your treatment and safety. Be sure to make and go to all appointments, and call your doctor if you are having problems. It's also a good idea to know your test results and keep a list of the medicines you take. How can you care for yourself at home? · If your doctor gave you medicine, take it as prescribed.  Call your doctor if you think you are having a problem with your medicine. · Whenever you are resting, raise your legs up. Try to keep the swollen area higher than the level of your heart. · Take breaks from standing or sitting in one position. ? Walk around to increase the blood flow in your lower legs. ? Move your feet and ankles often while you stand, or tighten and relax your leg muscles. · Wear support stockings. Put them on in the morning, before swelling gets worse. · Eat a balanced diet. Lose weight if you need to. · Limit the amount of salt (sodium) in your diet. Salt holds fluid in the body and may increase swelling. When should you call for help? Call 911 anytime you think you may need emergency care. For example, call if:    · You have symptoms of a blood clot in your lung (called a pulmonary embolism). These may include:  ? Sudden chest pain. ? Trouble breathing. ? Coughing up blood.    Call your doctor now or seek immediate medical care if:    · You have signs of a blood clot, such as:  ? Pain in your calf, back of the knee, thigh, or groin. ? Redness and swelling in your leg or groin.     · You have symptoms of infection, such as:  ? Increased pain, swelling, warmth, or redness. ? Red streaks or pus. ? A fever.    Watch closely for changes in your health, and be sure to contact your doctor if:    · Your swelling is getting worse.     · You have new or worsening pain in your legs.     · You do not get better as expected. Where can you learn more? Go to http://ke-tawanna.info/. Enter C519 in the search box to learn more about \"Leg and Ankle Edema: Care Instructions. \"  Current as of: June 26, 2019  Content Version: 12.2  © 5804-9305 [employer]. Care instructions adapted under license by [employer] (which disclaims liability or warranty for this information).  If you have questions about a medical condition or this instruction, always ask your healthcare professional. [employer] disclaims any warranty or liability for your use of this information.

## 2019-09-24 NOTE — ED NOTES
Patient has voided and juliana-care performed with 3 RN's to help patient roll from side to side. Patient has some redness/skin excoriation noted to groin folds and under pannus, this area cleansed with laure-clenz and wipes then dried and barrier cream applied. Patient has been repositioned on stretcher for comfort, pillows placed under left and right hips and under her ankles. Warm blanket given and patient returned to sam bed with her .

## 2019-09-25 NOTE — ED NOTES
Discharge instructions given to patient by MD and nurse. Pt has been given counseling on medication use and verbalizes understanding. IV d/c. Pt waiting for  to return with wheelchair and car.

## 2019-09-25 NOTE — ED NOTES
Pt was able to stand with 2x RN assist. Pt left via personal wheelchair no signs/symptoms of distress.

## 2019-11-05 ENCOUNTER — HOSPITAL ENCOUNTER (EMERGENCY)
Age: 79
Discharge: HOME HEALTH | End: 2019-11-05
Attending: EMERGENCY MEDICINE
Payer: MEDICARE

## 2019-11-05 VITALS
SYSTOLIC BLOOD PRESSURE: 133 MMHG | TEMPERATURE: 96 F | BODY MASS INDEX: 41.95 KG/M2 | HEART RATE: 109 BPM | WEIGHT: 293 LBS | OXYGEN SATURATION: 96 % | HEIGHT: 70 IN | DIASTOLIC BLOOD PRESSURE: 66 MMHG | RESPIRATION RATE: 18 BRPM

## 2019-11-05 DIAGNOSIS — S81.811A LEG LACERATION, RIGHT, INITIAL ENCOUNTER: Primary | ICD-10-CM

## 2019-11-05 PROCEDURE — 74011000250 HC RX REV CODE- 250: Performed by: EMERGENCY MEDICINE

## 2019-11-05 PROCEDURE — 90471 IMMUNIZATION ADMIN: CPT

## 2019-11-05 PROCEDURE — 75810000293 HC SIMP/SUPERF WND  RPR

## 2019-11-05 PROCEDURE — 99283 EMERGENCY DEPT VISIT LOW MDM: CPT

## 2019-11-05 PROCEDURE — 74011250636 HC RX REV CODE- 250/636: Performed by: EMERGENCY MEDICINE

## 2019-11-05 PROCEDURE — 90715 TDAP VACCINE 7 YRS/> IM: CPT | Performed by: EMERGENCY MEDICINE

## 2019-11-05 RX ORDER — OXYMETAZOLINE HCL 0.05 %
2 SPRAY, NON-AEROSOL (ML) NASAL
Status: DISCONTINUED | OUTPATIENT
Start: 2019-11-05 | End: 2019-11-05 | Stop reason: HOSPADM

## 2019-11-05 RX ORDER — CEPHALEXIN 500 MG/1
500 CAPSULE ORAL 4 TIMES DAILY
Qty: 28 CAP | Refills: 0 | Status: SHIPPED | OUTPATIENT
Start: 2019-11-05 | End: 2019-11-12

## 2019-11-05 RX ADMIN — TETANUS TOXOID, REDUCED DIPHTHERIA TOXOID AND ACELLULAR PERTUSSIS VACCINE, ADSORBED 0.5 ML: 5; 2.5; 8; 8; 2.5 SUSPENSION INTRAMUSCULAR at 14:06

## 2019-11-05 RX ADMIN — Medication 5 ML: at 14:10

## 2019-11-05 NOTE — ED PROVIDER NOTES
EMERGENCY DEPARTMENT HISTORY AND PHYSICAL EXAM      Date: 11/5/2019  Patient Name: Kandace Messer  Patient Age and Sex: 67 y.o. female     History of Presenting Illness     Chief Complaint   Patient presents with    Leg Laceration       History Provided By: Patient, and     HPI: Kandace Messer  Is a 79-year-old female with past medical history of lymphedema presenting today after sliding out of her arm chair and striking her leg on a piece of furniture. She reports that she has difficulty ambulating around the house and typically uses a power wheelchair but is in the process of attempting to obtain a new one. She did not have any chest pain, shortness of breath, or any syncope. Denies striking her head. Does not know when her last tetanus shot was. Apparently the EMS agency was concerned about patient's living situation as she has difficult ambulating and her  is also on oxygen and unable to help her a significant amount. There are no other complaints, changes, or physical findings at this time. PCP: Good Vega MD    No current facility-administered medications on file prior to encounter. Current Outpatient Medications on File Prior to Encounter   Medication Sig Dispense Refill    HYDROcodone-acetaminophen (NORCO) 5-325 mg per tablet Take 1 Tab by mouth every four (4) hours as needed for Pain. Max Daily Amount: 6 Tabs. 8 Tab 0    furosemide (LASIX) 80 mg tablet Take 1 Tab by mouth two (2) times a day. 60 Tab 1    carvedilol (COREG) 12.5 mg tablet Take 1 Tab by mouth two (2) times daily (with meals). 60 Tab 1    isosorbide mononitrate ER (IMDUR) 30 mg tablet Take 1 Tab by mouth daily. 30 Tab 1    nystatin (MYCOSTATIN) powder Apply  to affected area three (3) times daily. APPLY TO bilateral groins and under both breasts  Indications: CUTANEOUS CANDIDIASIS 1 Bottle 0    polyethylene glycol (MIRALAX) 17 gram packet Take 1 Packet by mouth daily.  [address] [facility] [address] spironolactone (ALDACTONE) 25 mg tablet Take 1 Tab by mouth daily. 30 Tab 1    warfarin (COUMADIN) 2.5 mg tablet Take 2.5 mg by mouth four (4) days a week. (2.5 mg on Xeg-Mpt-Bjgzn-Sat; 5 mg on Tues-Fri-Sun)      warfarin (COUMADIN) 5 mg tablet Take 5 mg by mouth three (3) days a week. (2.5 mg on Ksx-Owt-Kbvyc-Sat; 5 mg on Tues-Fri-Sun)      guaiFENesin ER (MUCINEX) 600 mg ER tablet Take 600 mg by mouth two (2) times a day.  docusate sodium (COLACE) 50 mg capsule Take 50 mg by mouth two (2) times daily as needed for Constipation.  ergocalciferol (ERGOCALCIFEROL) 50,000 unit capsule Take 50,000 Units by mouth.  levothyroxine (SYNTHROID) 112 mcg tablet Take 1 Tab by mouth Daily (before breakfast). 30 Tab 0    allopurinol (ZYLOPRIM) 100 mg tablet Take 100 mg by mouth daily.  POTASSIUM CHLORIDE (KLOR-CON PO) Take 20 mEq by mouth two (2) times a day.  FERROUS SULFATE, DRIED (IRON, DRIED, PO) Take 65 mg by mouth two (2) times a day.  LACTOBACILLUS RHAMNOSUS GG (PROBIOTIC PO) Take  by mouth. 15 billion       venlafaxine (EFFEXOR) 75 mg tablet Take 75 mg by mouth two (2) times a day.  loratadine (CLARITIN) 10 mg tablet Take 10 mg by mouth daily.  gabapentin (NEURONTIN) 400 mg capsule Take 400 mg by mouth nightly.  pantoprazole (PROTONIX) 40 mg tablet Take 40 mg by mouth daily.          Past History     Past Medical History:  Past Medical History:   Diagnosis Date    Arrhythmia     A Fib    Arthritis     Atrial fibrillation (HCC)     Cancer (HCC)     left breast cancer    Cardiomegaly     CKD (chronic kidney disease) stage 3, GFR 30-59 ml/min (HCC)     judith    Clostridium difficile infection     Congestive heart failure, unspecified     COPD     Diarrhea     /constipation    Dyspepsia and other specified disorders of function of stomach     Elevated antinuclear antibody (MARYAM) level     Endocrine disease     hypothyroidism    GERD (gastroesophageal reflux disease)     Hearing loss     Heart failure (HCC)     Hypertension     Hypertensive heart disease with heart failure (HCC)     diastolic dysfunction    Joint pain     Leg swelling     Long term (current) use of anticoagulants     Lymphedema     Morbid obesity (HCC)     Obesity, unspecified     Other ill-defined conditions(799.89)     lymph edema    Pleural effusion     s/p VATS    Shortness of breath     Thyroid disease     Unspecified sleep apnea     Does not use machine       Past Surgical History:  Past Surgical History:   Procedure Laterality Date    BREAST SURGERY PROCEDURE UNLISTED  13    LEFT BREAST MASTECTOMY SENTINEL NODE BIOPSY      HX BREAST BIOPSY      left    HX BREAST BIOPSY      fibroids removed right breast    HX  SECTION      times 2    HX KNEE ARTHROSCOPY      left knee     HX MODIFIED RADICAL MASTECTOMY  2013    BREAST MASTECTOMY MODIFIED RADICAL performed by Araseli Jean-Baptiste MD at [facility] MAIN OR    HX OTHER SURGICAL      pleural effusion    HX RHINOPLASTY      HX DOMI AND BSO      HX TONSILLECTOMY         Family History:  Family History   Problem Relation Age of Onset    Lung Disease Mother         Emphysema    Heart Disease Mother     Hypertension Mother     Cancer Father         Lung Cancer    Heart Disease Father     Hypertension Father     Cancer Paternal Aunt         colon       Social History:  Social History     Tobacco Use    Smoking status: Never Smoker    Smokeless tobacco: Never Used   Substance Use Topics    Alcohol use: No    Drug use: No     Types: Prescription, OTC       Allergies: Allergies   Allergen Reactions    Latex Rash    Grass Pollen-Bermuda, Standard Unknown (comments)    Sulfa (Sulfonamide Antibiotics) Nausea and Vomiting         Review of Systems   Constitutional: No  fever,  No  headache  Skin: No  rash, No  Jaundice, +  Skin laceration  HEENT: No  nasal congestion, No  eye drainage.    Resp: No cough,  No wheezing  CV: No chest pain, No  palpitations  GI: No vomiting,  No  diarrhea.,  No  constipation  : No dysuria,  No  hematuria  MSK: No joint pain,  No  trauma  Neuro: No numbness, No  tingling  Psych: No suicidal, No  paranoid      Physical Exam     Patient Vitals for the past 12 hrs:   Temp Pulse Resp BP SpO2   11/05/19 1229 96 °F (35.6 °C) (!) 109 18 133/66 96 %       General: alert, No acute distress  Eyes: EOMI, normal conjunctiva  ENT: moist mucous membranes. Neck: Active, full ROM of neck. Skin: No rashes. no jaundice              Lungs: Equal chest expansion. no respiratory distress. Heart: regular rate     no peripheral edema    Abd:  non distended soft  Back: Full ROM  MSK: Chronic lymphedema to bilateral lower extremities with ready red color skin changes, 6 cm superficial laceration overlying the right shin without active bleeding, no surrounding erythema  Neuro: alert  Person, Place, Time and Situation; normal speech;   Psych: Cooperative with exam; Appropriate mood and affect             Diagnostic Study Results     Labs -   No results found for this or any previous visit (from the past 12 hour(s)). Radiologic Studies -   No orders to display     CT Results  (Last 48 hours)    None        CXR Results  (Last 48 hours)    None            Medical Decision Making     Differential Diagnosis: Laceration, contusion, lymphedema,    I reviewed the vital signs, available nursing notes, past medical history, past surgical history, family history and social history and old medical records. Management/ED course: Patient presents today after striking her leg on a piece of furniture. She has a skin tear/laceration on the right anterior shin. She has lymphedema and has chronic wounds on her left lower extremity. I was able to close part of the wound, but due to the tension with the lymphedema, a large part of the middle of the wound is left open.   Patient placed on antibiotics, seen by case management who helped arrange home health for the patient. I discussed wet-to-dry dressings and follow-up for suture removal.  Patient also had tetanus updated. PROCEDURE:  Laceration Repair  Performed by: Veto Neil MD  Indication: Laceration    The wound located on the:  1)  Right shin measured 6 cm and was superficial, linear. The neurovascular exam was intact. Anesthesia was obtained with a total of 5 ml of lidocaine, epinephrine, tetracaine gel. wounds were noncontaminated. They were irrigated with extensively with saline and explored. [No] foreign bodies identified. Removal of [particulate matter] [was not] required. Extensive cleaning/undermining [was not] required. No apparent tendon or nerve injury. The wound was closed usin) 8 4-0 Ethilon superficial, simple interrupted. Wound was hemostatic and well approximated post-procedure. There were no complications. Dispo: Discharged. The patient has been re-evaluated and is ready for discharge. Reviewed available results with patient. Counseled patient on diagnosis and care plan. Patient has expressed understanding, and all questions have been answered. Patient agrees with plan and agrees to follow up as recommended, or to return to the ED if their symptoms worsen. Discharge instructions have been provided and explained to the patient, along with reasons to return to the ED. PLAN:  Current Discharge Medication List      START taking these medications    Details   cephALEXin (KEFLEX) 500 mg capsule Take 1 Cap by mouth four (4) times daily for 7 days. Qty: 28 Cap, Refills: 0           2. Follow-up Information     Follow up With Specialties Details Why Contact Info    Momo Lunsford MD [address].  [phone]          3. Return to ED if worse     Diagnosis     Clinical Impression:   1.  Leg laceration, right, initial encounter        Attestations:    Veto Neil MD
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
## 2019-11-05 NOTE — ED NOTES
Pt discharged by Dr Nery Andrade. Pt provided with discharge instructions Rx and instructions on follow up care. Pt out of ED via wheelchair accompanied by This RN and 2 others. Estelita Ferrari
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
## 2019-11-07 RX ORDER — DOXYCYCLINE HYCLATE 100 MG
100 TABLET ORAL 2 TIMES DAILY
Qty: 10 TAB | Refills: 0 | Status: SHIPPED | OUTPATIENT
Start: 2019-11-07 | End: 2019-11-12

## 2019-11-07 NOTE — CALL BACK NOTE
Patient wanted doxycycline antibiotic verses keflex. She states the Keflex caused her to have C-Diff and she would prefer something else if warranted. Patient states she tolerated previous dose of doxycycline. Prescription was e-scripted to pharmacy of choice.

## 2019-11-15 ENCOUNTER — APPOINTMENT (OUTPATIENT)
Dept: GENERAL RADIOLOGY | Age: 79
DRG: 291 | End: 2019-11-15
Attending: EMERGENCY MEDICINE
Payer: MEDICARE

## 2019-11-15 ENCOUNTER — HOSPITAL ENCOUNTER (INPATIENT)
Age: 79
LOS: 5 days | Discharge: NURSING, REHAB OR LONG-TERM CARE | DRG: 291 | End: 2019-11-20
Attending: EMERGENCY MEDICINE | Admitting: HOSPITALIST
Payer: MEDICARE

## 2019-11-15 DIAGNOSIS — E87.79 OTHER HYPERVOLEMIA: Primary | ICD-10-CM

## 2019-11-15 PROBLEM — R60.0 BILATERAL LEG EDEMA: Status: ACTIVE | Noted: 2019-11-15

## 2019-11-15 PROBLEM — E87.70 HYPERVOLEMIA: Status: ACTIVE | Noted: 2019-11-15

## 2019-11-15 LAB
ANION GAP SERPL CALC-SCNC: 5 MMOL/L (ref 5–15)
BASOPHILS # BLD: 0 K/UL (ref 0–0.1)
BASOPHILS NFR BLD: 1 % (ref 0–1)
BNP SERPL-MCNC: 6730 PG/ML
BUN SERPL-MCNC: 25 MG/DL (ref 6–20)
BUN/CREAT SERPL: 17 (ref 12–20)
CALCIUM SERPL-MCNC: 9 MG/DL (ref 8.5–10.1)
CHLORIDE SERPL-SCNC: 107 MMOL/L (ref 97–108)
CO2 SERPL-SCNC: 30 MMOL/L (ref 21–32)
COMMENT, HOLDF: NORMAL
CREAT SERPL-MCNC: 1.43 MG/DL (ref 0.55–1.02)
DIFFERENTIAL METHOD BLD: ABNORMAL
EOSINOPHIL # BLD: 0.2 K/UL (ref 0–0.4)
EOSINOPHIL NFR BLD: 3 % (ref 0–7)
ERYTHROCYTE [DISTWIDTH] IN BLOOD BY AUTOMATED COUNT: 17.3 % (ref 11.5–14.5)
GLUCOSE SERPL-MCNC: 98 MG/DL (ref 65–100)
HCT VFR BLD AUTO: 39.9 % (ref 35–47)
HGB BLD-MCNC: 12 G/DL (ref 11.5–16)
IMM GRANULOCYTES # BLD AUTO: 0 K/UL (ref 0–0.04)
IMM GRANULOCYTES NFR BLD AUTO: 0 % (ref 0–0.5)
INR PPP: 2.5 (ref 0.9–1.1)
LYMPHOCYTES # BLD: 0.8 K/UL (ref 0.8–3.5)
LYMPHOCYTES NFR BLD: 13 % (ref 12–49)
MCH RBC QN AUTO: 29.3 PG (ref 26–34)
MCHC RBC AUTO-ENTMCNC: 30.1 G/DL (ref 30–36.5)
MCV RBC AUTO: 97.6 FL (ref 80–99)
MONOCYTES # BLD: 0.7 K/UL (ref 0–1)
MONOCYTES NFR BLD: 11 % (ref 5–13)
NEUTS SEG # BLD: 4.7 K/UL (ref 1.8–8)
NEUTS SEG NFR BLD: 72 % (ref 32–75)
NRBC # BLD: 0 K/UL (ref 0–0.01)
NRBC BLD-RTO: 0 PER 100 WBC
PLATELET # BLD AUTO: 200 K/UL (ref 150–400)
PMV BLD AUTO: 10.1 FL (ref 8.9–12.9)
POTASSIUM SERPL-SCNC: 4.8 MMOL/L (ref 3.5–5.1)
PROTHROMBIN TIME: 24.3 SEC (ref 9–11.1)
RBC # BLD AUTO: 4.09 M/UL (ref 3.8–5.2)
SAMPLES BEING HELD,HOLD: NORMAL
SODIUM SERPL-SCNC: 142 MMOL/L (ref 136–145)
WBC # BLD AUTO: 6.5 K/UL (ref 3.6–11)

## 2019-11-15 PROCEDURE — 83880 ASSAY OF NATRIURETIC PEPTIDE: CPT

## 2019-11-15 PROCEDURE — 85610 PROTHROMBIN TIME: CPT

## 2019-11-15 PROCEDURE — 99285 EMERGENCY DEPT VISIT HI MDM: CPT

## 2019-11-15 PROCEDURE — 74011250636 HC RX REV CODE- 250/636: Performed by: EMERGENCY MEDICINE

## 2019-11-15 PROCEDURE — 93005 ELECTROCARDIOGRAM TRACING: CPT

## 2019-11-15 PROCEDURE — 85025 COMPLETE CBC W/AUTO DIFF WBC: CPT

## 2019-11-15 PROCEDURE — 80048 BASIC METABOLIC PNL TOTAL CA: CPT

## 2019-11-15 PROCEDURE — 65270000029 HC RM PRIVATE

## 2019-11-15 PROCEDURE — 74011250637 HC RX REV CODE- 250/637: Performed by: HOSPITALIST

## 2019-11-15 PROCEDURE — 71045 X-RAY EXAM CHEST 1 VIEW: CPT

## 2019-11-15 RX ORDER — FUROSEMIDE 10 MG/ML
80 INJECTION INTRAMUSCULAR; INTRAVENOUS
Status: COMPLETED | OUTPATIENT
Start: 2019-11-15 | End: 2019-11-15

## 2019-11-15 RX ORDER — ACETAMINOPHEN 325 MG/1
650 TABLET ORAL
Status: DISCONTINUED | OUTPATIENT
Start: 2019-11-15 | End: 2019-11-20 | Stop reason: HOSPADM

## 2019-11-15 RX ORDER — DOCUSATE SODIUM 100 MG/1
100 CAPSULE, LIQUID FILLED ORAL
Status: DISCONTINUED | OUTPATIENT
Start: 2019-11-15 | End: 2019-11-20 | Stop reason: HOSPADM

## 2019-11-15 RX ORDER — CARVEDILOL 12.5 MG/1
12.5 TABLET ORAL 2 TIMES DAILY WITH MEALS
Status: DISCONTINUED | OUTPATIENT
Start: 2019-11-15 | End: 2019-11-20 | Stop reason: HOSPADM

## 2019-11-15 RX ORDER — METFORMIN HYDROCHLORIDE 500 MG/1
500 TABLET, EXTENDED RELEASE ORAL
COMMUNITY

## 2019-11-15 RX ORDER — VENLAFAXINE HYDROCHLORIDE 37.5 MG/1
75 CAPSULE, EXTENDED RELEASE ORAL
Status: DISCONTINUED | OUTPATIENT
Start: 2019-11-16 | End: 2019-11-20 | Stop reason: HOSPADM

## 2019-11-15 RX ORDER — NALOXONE HYDROCHLORIDE 0.4 MG/ML
0.4 INJECTION, SOLUTION INTRAMUSCULAR; INTRAVENOUS; SUBCUTANEOUS AS NEEDED
Status: DISCONTINUED | OUTPATIENT
Start: 2019-11-15 | End: 2019-11-20 | Stop reason: HOSPADM

## 2019-11-15 RX ORDER — GABAPENTIN 400 MG/1
400 CAPSULE ORAL
Status: DISCONTINUED | OUTPATIENT
Start: 2019-11-15 | End: 2019-11-20 | Stop reason: HOSPADM

## 2019-11-15 RX ORDER — SODIUM CHLORIDE 0.9 % (FLUSH) 0.9 %
5-40 SYRINGE (ML) INJECTION AS NEEDED
Status: DISCONTINUED | OUTPATIENT
Start: 2019-11-15 | End: 2019-11-20 | Stop reason: HOSPADM

## 2019-11-15 RX ORDER — MICONAZOLE NITRATE 2 %
POWDER (GRAM) TOPICAL 2 TIMES DAILY
Status: DISCONTINUED | OUTPATIENT
Start: 2019-11-15 | End: 2019-11-20 | Stop reason: HOSPADM

## 2019-11-15 RX ORDER — VENLAFAXINE 37.5 MG/1
75 TABLET ORAL 2 TIMES DAILY
Status: CANCELLED | OUTPATIENT
Start: 2019-11-15

## 2019-11-15 RX ORDER — ALBUTEROL SULFATE 90 UG/1
2 AEROSOL, METERED RESPIRATORY (INHALATION)
COMMUNITY

## 2019-11-15 RX ORDER — ISOSORBIDE MONONITRATE 30 MG/1
30 TABLET, EXTENDED RELEASE ORAL DAILY
Status: CANCELLED | OUTPATIENT
Start: 2019-11-16

## 2019-11-15 RX ORDER — SODIUM CHLORIDE 0.9 % (FLUSH) 0.9 %
5-40 SYRINGE (ML) INJECTION EVERY 8 HOURS
Status: DISCONTINUED | OUTPATIENT
Start: 2019-11-15 | End: 2019-11-20 | Stop reason: HOSPADM

## 2019-11-15 RX ORDER — FUROSEMIDE 10 MG/ML
80 INJECTION INTRAMUSCULAR; INTRAVENOUS 2 TIMES DAILY
Status: DISCONTINUED | OUTPATIENT
Start: 2019-11-16 | End: 2019-11-20 | Stop reason: HOSPADM

## 2019-11-15 RX ORDER — PANTOPRAZOLE SODIUM 40 MG/1
40 TABLET, DELAYED RELEASE ORAL
Status: DISCONTINUED | OUTPATIENT
Start: 2019-11-16 | End: 2019-11-20 | Stop reason: HOSPADM

## 2019-11-15 RX ORDER — SPIRONOLACTONE 25 MG/1
25 TABLET ORAL DAILY
Status: DISCONTINUED | OUTPATIENT
Start: 2019-11-16 | End: 2019-11-20 | Stop reason: HOSPADM

## 2019-11-15 RX ORDER — ONDANSETRON 2 MG/ML
4 INJECTION INTRAMUSCULAR; INTRAVENOUS
Status: DISCONTINUED | OUTPATIENT
Start: 2019-11-15 | End: 2019-11-20 | Stop reason: HOSPADM

## 2019-11-15 RX ORDER — ALLOPURINOL 100 MG/1
100 TABLET ORAL DAILY
Status: DISCONTINUED | OUTPATIENT
Start: 2019-11-16 | End: 2019-11-20 | Stop reason: HOSPADM

## 2019-11-15 RX ORDER — POLYETHYLENE GLYCOL 3350 17 G/17G
17 POWDER, FOR SOLUTION ORAL DAILY
Status: CANCELLED | OUTPATIENT
Start: 2019-11-16

## 2019-11-15 RX ORDER — ZOLPIDEM TARTRATE 5 MG/1
5 TABLET ORAL
Status: DISCONTINUED | OUTPATIENT
Start: 2019-11-15 | End: 2019-11-20 | Stop reason: HOSPADM

## 2019-11-15 RX ADMIN — FUROSEMIDE 80 MG: 10 INJECTION, SOLUTION INTRAMUSCULAR; INTRAVENOUS at 18:02

## 2019-11-15 RX ADMIN — CARVEDILOL 12.5 MG: 12.5 TABLET, FILM COATED ORAL at 21:21

## 2019-11-15 RX ADMIN — WARFARIN SODIUM 2.5 MG: 2 TABLET ORAL at 21:21

## 2019-11-15 RX ADMIN — Medication 10 ML: at 21:21

## 2019-11-15 RX ADMIN — GABAPENTIN 400 MG: 400 CAPSULE ORAL at 21:21

## 2019-11-15 NOTE — ED PROVIDER NOTES
67 y.o. female with past medical history significant for obesity, lymphedema, elevated antinuclear antibody level, pleural effusion, heart disease, HTN, a-fib, clostridium difficile infection, hearing loss, CHF, leg swelling, COPD, SOB, joint pain, diarrhea, CKD, hypothyroidism, heart failure, cardiomegaly, GERD, sleep apnea, long term anti-coag use, dyspepsia, morbid obesity, and breast cancer who presents from home via EMS with chief complaint of leg swelling. Patient presents to ED with worsening SOB and lymphedema. Patient states her leg swelling is increasing and now has onset of blisters to both her legs bilaterally. Patient notes she has an old laceration to her right shin with old stiches still in place. Patient states due to her lymphedema she is unable to ambulate without heavy assistance. Patient reports she has stopped taking her Lasix because of her lack of mobility to use the bathroom when she takes Lasix. Patient notes she was previously seen in the hospital for extreme lymphedema, when she weighed over 400 lbs and was brought down to 300 lbs after fluid removal, while stating she is back to over 400 lbs today. Patient also complains of becoming easily SOB when she is stressed or overactive. Patient states her SOB has become increasingly prevalent over the past month. Patient affirms she still takes coumadin. Patient affirms SOB, leg swelling, weakness, and wounds. Patient denies fever and chills. There are no other acute medical concerns at this time. Social hx: Never smoker  PCP: Aashish Metzger MD    Note written by Rei Birmingham, as dictated by Maciel Dillon MD 3:03 PM       The history is provided by the patient and the spouse. No  was used.         Past Medical History:   Diagnosis Date    Arrhythmia     A Fib    Arthritis     Atrial fibrillation (HCC)     Cancer (HCC)     left breast cancer    Cardiomegaly     CKD (chronic kidney disease) stage 3, GFR 30-59 ml/min (HCC)     judith    Clostridium difficile infection     Congestive heart failure, unspecified     COPD     Diarrhea     /constipation    Dyspepsia and other specified disorders of function of stomach     Elevated antinuclear antibody (MARYAM) level     Endocrine disease     hypothyroidism    GERD (gastroesophageal reflux disease)     Hearing loss     Heart failure (HCC)     Hypertension     Hypertensive heart disease with heart failure (HCC)     diastolic dysfunction    Joint pain     Leg swelling     Long term (current) use of anticoagulants     Lymphedema     Morbid obesity (HCC)     Obesity, unspecified     Other ill-defined conditions(799.89)     lymph edema    Pleural effusion     s/p VATS    Shortness of breath     Thyroid disease     Unspecified sleep apnea     Does not use machine       Past Surgical History:   Procedure Laterality Date    BREAST SURGERY PROCEDURE UNLISTED  13    LEFT BREAST MASTECTOMY SENTINEL NODE BIOPSY      HX BREAST BIOPSY      left    HX BREAST BIOPSY      fibroids removed right breast    HX  SECTION      times 2    HX KNEE ARTHROSCOPY      left knee     HX MODIFIED RADICAL MASTECTOMY  2013    BREAST MASTECTOMY MODIFIED RADICAL performed by Elva Nunez MD at [facility] MAIN OR    HX OTHER SURGICAL      pleural effusion    HX RHINOPLASTY      HX DOMI AND BSO      HX TONSILLECTOMY           Family History:   Problem Relation Age of Onset    Lung Disease Mother         Emphysema    Heart Disease Mother     Hypertension Mother     Cancer Father         Lung Cancer    Heart Disease Father     Hypertension Father     Cancer Paternal Aunt         colon       Social History     Socioeconomic History    Marital status:      Spouse name: Not on file    Number of children: Not on file    Years of education: Not on file    Highest education level: Not on file   Occupational History    Not on file   Social Needs    Financial resource strain: Not on file    Food insecurity:     Worry: Not on file     Inability: Not on file    Transportation needs:     Medical: Not on file     Non-medical: Not on file   Tobacco Use    Smoking status: Never Smoker    Smokeless tobacco: Never Used   Substance and Sexual Activity    Alcohol use: No    Drug use: No     Types: Prescription, OTC    Sexual activity: Not on file   Lifestyle    Physical activity:     Days per week: Not on file     Minutes per session: Not on file    Stress: Not on file   Relationships    Social connections:     Talks on phone: Not on file     Gets together: Not on file     Attends Yazidism service: Not on file     Active member of club or organization: Not on file     Attends meetings of clubs or organizations: Not on file     Relationship status: Not on file    Intimate partner violence:     Fear of current or ex partner: Not on file     Emotionally abused: Not on file     Physically abused: Not on file     Forced sexual activity: Not on file   Other Topics Concern    Not on file   Social History Narrative    Not on file         ALLERGIES: Latex; Grass pollen-bermuda, standard; and Sulfa (sulfonamide antibiotics)    Review of Systems   Constitutional: Negative for chills and fever. Respiratory: Positive for shortness of breath. Cardiovascular: Positive for leg swelling. Negative for chest pain. Gastrointestinal: Negative for abdominal pain, constipation, diarrhea and vomiting. Skin: Positive for wound. Neurological: Positive for weakness. Negative for dizziness and light-headedness. All other systems reviewed and are negative. Vitals:    11/15/19 1405   BP: 131/68   Pulse: 96   Resp: 16   Temp: 97.5 °F (36.4 °C)   SpO2: 97%   Weight: (!) 182 kg (401 lb 3.8 oz)   Height: 5' 9.5\" (1.765 m)            Physical Exam   Constitutional: She is oriented to person, place, and time. She appears well-developed and well-nourished.    HENT:   Head: Normocephalic and atraumatic. Eyes: Pupils are equal, round, and reactive to light. Neck: Normal range of motion. Neck supple. Cardiovascular: Normal rate and regular rhythm. Pulmonary/Chest: Effort normal.   Crackles to base bilaterally   Abdominal: Soft. She exhibits no distension. There is no tenderness. Neurological: She is alert and oriented to person, place, and time. Skin: Skin is warm and dry. Capillary refill takes less than 2 seconds. Severe lymph edema bilaterally to lower extremities extending upwards past thighs. Chronic skin changes and blisters. Small wounds with minimal drainage. Right shin anterior horizontal laceration recently repaired, edges are approximated but center of wound is still open. No surrounding erythema, tenderness, or warmth. Psychiatric: She has a normal mood and affect. Her behavior is normal.   Nursing note and vitals reviewed. Note written by Rei Houston, as dictated by Shanell Joiner MD 2:41 PM       MDM  Number of Diagnoses or Management Options  Other hypervolemia:   Diagnosis management comments: Pt with severe fluid overload resulting in shortness of breath, orthopnea, and inability to ambulate. Not tolerating home diuresis because she is unable to manage her urination. Procedures    ED EKG interpretation: 14:08  Rhythm: atrial fib; and Irregularly irregular. Rate (approx.): 96; Axis: normal; ST/T wave: Normal; No STEMI  Note written by Rei Houston, as dictated by Shanell Joiner MD 3:20 PM       Patient is being admitted to the hospital.  The results of their tests and reasons for their admission have been discussed with them and/or available family. They convey agreement and understanding for the need to be admitted and for their admission diagnosis. Consultation will be made now with the inpatient physician for hospitalization.     Umesh Ramirez Text for Admission  5:09 PM    ED Room Number: ER29/29  Patient Name and age:  Stephanie Phelps 67 y.o.  female  Working Diagnosis:   1.  Other hypervolemia      Readmission: no  Isolation Requirements:  no  Recommended Level of Care:  med/surg  Code Status:  Full  Other:  Unable to manage ADL's  Department:[facility] - [phone]

## 2019-11-15 NOTE — ROUTINE PROCESS
TRANSFER - OUT REPORT: 
 
Verbal report given to [facility](name) on Myra Modi  being transferred to (unit) for routine progression of care Report consisted of patients Situation, Background, Assessment and  
Recommendations(SBAR). Information from the following report(s) SBAR, Kardex, ED Summary and MAR was reviewed with the receiving nurse. Lines:  
Peripheral IV 11/15/19 Right Antecubital (Active) Site Assessment Clean, dry, & intact 11/15/2019  2:30 PM  
Phlebitis Assessment 0 11/15/2019  2:30 PM  
Infiltration Assessment 0 11/15/2019  2:30 PM  
Dressing Status Clean, dry, & intact 11/15/2019  2:30 PM  
  
 
Opportunity for questions and clarification was provided. Patient transported with: 
 [employer]

## 2019-11-15 NOTE — ED TRIAGE NOTES
Triage note: Pt arrives via EMS from home with c/o bilateral blisters to her legs. Pt noticed blisters today. Pt has chronic lymphedema but swelling is worse. Noncompliant with lasix d/t inability to walk and get to restroom.

## 2019-11-15 NOTE — PROGRESS NOTES
Admission Medication Reconciliation:    Information obtained from:  Patient's  provided medication list and history  RxQuery data available¹:  YES    Comments/Recommendations: Updated PTA meds/reviewed patient's allergies.  was excellent historian. Notes:  Doxycycline course recently completed, treating lower extremity leg wounds    Medication changes (since last review): Added  Albuterol  Metformin    Adjusted  Venlafaxine to ER form  Warfarin schedule (Mondays = 5 mg, 2.5 mg on all other days)    Removed  Ergocalciferol  Norco  Miralax  Isosorbide mononitrate    Thank you for allowing me to participate in the care of your patient. Fransisca Shields PharmD, RN #[id number]         [facility] benefit data reflects medications filled and processed through the patient's insurance, however   this data does NOT capture whether the medication was picked up or is currently being taken by the patient.     Allergies:  Latex; Grass pollen-bermuda, standard; and Sulfa (sulfonamide antibiotics)    Significant PMH/Disease States:   Past Medical History:   Diagnosis Date    Arrhythmia     A Fib    Arthritis     Atrial fibrillation ([facility] [address]     Cancer (HCC)     left breast cancer    Cardiomegaly     CKD (chronic kidney disease) stage 3, GFR 30-59 ml/min (HCC)     judith    Clostridium difficile infection     Congestive heart failure, unspecified     COPD     Diarrhea     /constipation    Dyspepsia and other specified disorders of function of stomach     Elevated antinuclear antibody (MARYAM) level     Endocrine disease     hypothyroidism    GERD (gastroesophageal reflux disease)     Hearing loss     Heart failure (HCC)     Hypertension     Hypertensive heart disease with heart failure (HCC)     diastolic dysfunction    Joint pain     Leg swelling     Long term (current) use of anticoagulants     Lymphedema     Morbid obesity (HCC)     Obesity, unspecified     Other ill-defined conditions(799.89)     lymph edema Pleural effusion     s/p VATS    Shortness of breath     Thyroid disease     Unspecified sleep apnea     Does not use machine     Chief Complaint for this Admission:    Chief Complaint   Patient presents with    Skin Problem     Prior to Admission Medications:   Prior to Admission Medications   Prescriptions Last Dose Informant Patient Reported? Taking? FERROUS SULFATE, DRIED (IRON, DRIED, PO) 11/15/2019 at Unknown time  Yes Yes   Sig: Take 65 mg by mouth two (2) times a day. LACTOBACILLUS RHAMNOSUS GG (PROBIOTIC PO) 11/15/2019 at Unknown time  Yes Yes   Sig: Take 1 Cap by mouth daily. 15 billion    POTASSIUM CHLORIDE (KLOR-CON PO) 11/15/2019 at Unknown time  Yes Yes   Sig: Take 20 mEq by mouth two (2) times a day. albuterol (PROVENTIL HFA, VENTOLIN HFA, PROAIR HFA) 90 mcg/actuation inhaler 11/8/2019 at Unknown time  Yes Yes   Sig: Take 2 Puffs by inhalation every four (4) hours as needed for Wheezing. allopurinol (ZYLOPRIM) 100 mg tablet 11/15/2019 at Unknown time  Yes Yes   Sig: Take 100 mg by mouth daily. carvedilol (COREG) 12.5 mg tablet 11/15/2019 at Unknown time  No Yes   Sig: Take 1 Tab by mouth two (2) times daily (with meals). docusate sodium (COLACE) 50 mg capsule 11/15/2019 at Unknown time  Yes Yes   Sig: Take 50 mg by mouth two (2) times daily as needed for Constipation. furosemide (LASIX) 80 mg tablet 11/15/2019 at Unknown time  No Yes   Sig: Take 1 Tab by mouth two (2) times a day.   gabapentin (NEURONTIN) 400 mg capsule 11/14/2019 at Unknown time Self Yes Yes   Sig: Take 400 mg by mouth nightly. guaiFENesin ER (MUCINEX) 600 mg ER tablet 11/15/2019 at Unknown time  Yes Yes   Sig: Take 600 mg by mouth two (2) times a day. levothyroxine (SYNTHROID) 112 mcg tablet 11/15/2019 at Unknown time  No Yes   Sig: Take 1 Tab by mouth Daily (before breakfast). loratadine (CLARITIN) 10 mg tablet 11/15/2019 at Unknown time Self Yes Yes   Sig: Take 10 mg by mouth daily.      metFORMIN ER (GLUCOPHAGE XR) 500 mg tablet 11/14/2019 at Unknown time  Yes Yes   Sig: Take 500 mg by mouth daily (with dinner). nystatin (MYCOSTATIN) powder 11/15/2019 at Unknown time  No Yes   Sig: Apply  to affected area three (3) times daily. APPLY TO bilateral groins and under both breasts  Indications: CUTANEOUS CANDIDIASIS   pantoprazole (PROTONIX) 40 mg tablet 11/15/2019 at Unknown time  Yes Yes   Sig: Take 40 mg by mouth daily. spironolactone (ALDACTONE) 25 mg tablet 11/15/2019 at Unknown time  No Yes   Sig: Take 1 Tab by mouth daily. warfarin (COUMADIN) 2.5 mg tablet 11/14/2019 at Unknown time  Yes Yes   Sig: Take 2.5 mg by mouth six (6) days a week. Every day except Mondays   warfarin (COUMADIN) 5 mg tablet 11/11/2019  Yes No   Sig: Take 5 mg by mouth every Monday. Mondays only      Facility-Administered Medications: None       Please contact the main inpatient pharmacy with any questions or concerns at [phone] and we will direct you to the clinical pharmacist covering this patient's care while in-house.    AYSHA Calles

## 2019-11-15 NOTE — H&P
History & Physical    Primary Care Provider: Marc No MD  Source of Information: Patient     History of Presenting Illness:   Melissa Interiano is a 67 y.o. female who presents with increasing leg swelling, sob     67 y.o. female with past medical history significant for obesity, lymphedema, elevated antinuclear antibody level, pleural effusion,  HTN, a-fib,  hearing loss, CHF, leg swelling, COPD, gout,  CKD, hypothyroidism, GERD, sleep apnea,morbid obesity, and breast cancer who presents from home via EMS with chief complaint of leg swelling and sob. Patient presents to ED with worsening SOB and lymphedema. Patient states her leg swelling is increasing and now has onset of blisters to both her legs bilaterally. Patient notes she has an old laceration to her right shin with old stiches still in place. Patient states due to her lymphedema she is unable to ambulate without heavy assistance. Patient reports she has stopped taking her Lasix because of her lack of mobility to use the bathroom when she takes Lasix. Patient notes she was previously seen in the hospital for extreme lymphedema, when she weighed over 400 lbs and was brought down to 300 lbs after fluid removal, while stating she is back to over 400 lbs today. Patient also complains of becoming easily SOB when she is stressed or overactive. Patient states her SOB has become increasingly prevalent over the past month. Patient affirms she still takes coumadin. Patient affirms SOB, leg swelling, weakness, and wounds. Patient denies fever and chills. There are no other acute medical concerns at this time. Review of Systems:  General: no fever, HPI, poor sleep. Bed bound now   HEENT: no headache, no vision changes, no nose discharge, hard of hearing over 6-7 years. RES: has partial vocal cord dysfunction, chronic hoarse voice   CVS: no cp, no palpitation.   Muscular: no joint swelling, hpi   Skin: no rash,hpi, rash under breast   GI: no vomiting, no diarrhea  : no dysuria, no hematuria  Hemo: no gum bleeding, no petechial   Neuro: no sensation changes, no focal weakness   Endo: no polydipsia   Psych: denied depression     Past Medical History:   Diagnosis Date    Arrhythmia     A Fib    Arthritis     Atrial fibrillation (HCC)     Cancer (HCC)     left breast cancer    Cardiomegaly     CKD (chronic kidney disease) stage 3, GFR 30-59 ml/min (HCC)     judith    Clostridium difficile infection     Congestive heart failure, unspecified     COPD     Diarrhea     /constipation    Dyspepsia and other specified disorders of function of stomach     Elevated antinuclear antibody (MARYAM) level     Endocrine disease     hypothyroidism    GERD (gastroesophageal reflux disease)     Hearing loss     Heart failure (HCC)     Hypertension     Hypertensive heart disease with heart failure (HCC)     diastolic dysfunction    Joint pain     Leg swelling     Long term (current) use of anticoagulants     Lymphedema     Morbid obesity (HCC)     Obesity, unspecified     Other ill-defined conditions(799.89)     lymph edema    Pleural effusion     s/p VATS    Shortness of breath     Thyroid disease     Unspecified sleep apnea     Does not use machine      Past Surgical History:   Procedure Laterality Date    BREAST SURGERY PROCEDURE UNLISTED  13    LEFT BREAST MASTECTOMY SENTINEL NODE BIOPSY      HX BREAST BIOPSY      left    HX BREAST BIOPSY      fibroids removed right breast    HX  SECTION      times 2    HX KNEE ARTHROSCOPY      left knee     HX MODIFIED RADICAL MASTECTOMY  2013    BREAST MASTECTOMY MODIFIED RADICAL performed by Ollie [facility]ulkarn[facility] MD at [facility] MAIN OR    HX OTHER SURGICAL      pleural effusion    HX RHINOPLASTY      HX DOMI AND BSO      HX TONSILLECTOMY       Prior to Admission medications    Medication Sig Start Date End Date Taking?  Authorizing Provider HYDROcodone-acetaminophen (NORCO) 5-325 mg per tablet Take 1 Tab by mouth every four (4) hours as needed for Pain. Max Daily Amount: 6 Tabs. 12/18/17   Domi Shea MD   furosemide (LASIX) 80 mg tablet Take 1 Tab by mouth two (2) times a day. 10/16/17   Lisseth Todd MD   carvedilol (COREG) 12.5 mg tablet Take 1 Tab by mouth two (2) times daily (with meals). 10/16/17   Lisseth Todd MD   isosorbide mononitrate ER (IMDUR) 30 mg tablet Take 1 Tab by mouth daily. 10/16/17   Lisseth Todd MD   nystatin (MYCOSTATIN) powder Apply  to affected area three (3) times daily. APPLY TO bilateral groins and under both breasts  Indications: CUTANEOUS CANDIDIASIS 10/16/17   Lisseth Todd MD   polyethylene glycol (MIRALAX) 17 gram packet Take 1 Packet by mouth daily. 10/16/17   Lisseth Todd MD   spironolactone (ALDACTONE) 25 mg tablet Take 1 Tab by mouth daily. 10/16/17   Lisseth Todd MD   warfarin (COUMADIN) 2.5 mg tablet Take 2.5 mg by mouth four (4) days a week. (2.5 mg on Imt-Fzh-Zsfcm-Sat; 5 mg on Tues-Fri-Sun)    Provider, Historical   warfarin (COUMADIN) 5 mg tablet Take 5 mg by mouth three (3) days a week. (2.5 mg on Mon-Wed-Thurs-Sat; 5 mg on Tues-Fri-Sun)    Provider, Historical   guaiFENesin ER (MUCINEX) 600 mg ER tablet Take 600 mg by mouth two (2) times a day. Provider, Historical   docusate sodium (COLACE) 50 mg capsule Take 50 mg by mouth two (2) times daily as needed for Constipation. Provider, Historical   ergocalciferol (ERGOCALCIFEROL) 50,000 unit capsule Take 50,000 Units by mouth. Provider, Historical   levothyroxine (SYNTHROID) 112 mcg tablet Take 1 Tab by mouth Daily (before breakfast). 12/7/16   Paz Marquez MD   allopurinol (ZYLOPRIM) 100 mg tablet Take 100 mg by mouth daily. Other, MD Eva   POTASSIUM CHLORIDE (KLOR-CON PO) Take 20 mEq by mouth two (2) times a day.     Provider, Historical   FERROUS SULFATE, DRIED (IRON, DRIED, PO) Take 65 mg by mouth two (2) times a day. Provider, Historical   LACTOBACILLUS RHAMNOSUS GG (PROBIOTIC PO) Take  by mouth. 15 billion     Provider, Historical   venlafaxine (EFFEXOR) 75 mg tablet Take 75 mg by mouth two (2) times a day. Provider, Historical   loratadine (CLARITIN) 10 mg tablet Take 10 mg by mouth daily. Provider, Historical   gabapentin (NEURONTIN) 400 mg capsule Take 400 mg by mouth nightly. Provider, Historical   pantoprazole (PROTONIX) 40 mg tablet Take 40 mg by mouth daily. Provider, Historical     Allergies   Allergen Reactions    Latex Rash    Grass Pollen-[place], Standard Unknown (comments)    Sulfa (Sulfonamide Antibiotics) Nausea and Vomiting      Family History   Problem Relation Age of Onset    Lung Disease Mother         Emphysema    Heart Disease Mother     Hypertension Mother     Cancer Father         Lung Cancer    Heart Disease Father     Hypertension Father     Cancer Paternal Aunt         colon        SOCIAL HISTORY:  Patient resides:  Independently    Assisted Living    SNF    With family care x      Smoking history:   None x   Former    Chronic      Alcohol history:   None x   Social    Chronic      Ambulates:   Independently    w/cane    w/walker    w/wc x   CODE STATUS:  DNR    Full x   Other      Objective:     Physical Exam:     Visit Vitals  /83 (BP 1 Location: Right arm, BP Patient Position: At rest)   Pulse 98   Temp 97.5 °F (36.4 °C)   Resp 18   Ht 5' 9.5\" (1.765 m)   Wt (!) 182 kg (401 lb 3.8 oz)   SpO2 100%   BMI 58.40 kg/m²      O2 Device: Room air    General:  Alert, cooperative,morbidy obese. Horse voice. Hard of hearing    Head:  Normocephalic, without obvious abnormality, atraumatic. Eyes:  Conjunctivae/corneas clear. PERRL, EOMs intact. Nose: Nares normal. Septum midline. Mucosa normal. No drainage or sinus tenderness.    Throat: Lips, mucosa, and tongue normal. Teeth and gums normal.   Neck: Supple, symmetrical, trachea midline, no adenopathy, thyroid: no enlargement/tenderness/nodules, no carotid bruit and no JVD. Back:   Symmetric, no curvature. ROM normal. No CVA tenderness. Lungs:   Clear to auscultation bilaterally. Chest wall:  No tenderness or deformity. Rash under breast    Heart:  Regular rate and rhythm, S1, S2 normal, no murmur, click, rub or gallop. Abdomen:   Soft, non-tender. Bowel sounds normal. No masses,  No organomegaly. Extremities: Chronic lymphedema changes, open wound right lower leg with some bleeding. Pulses: 2+ and symmetric all extremities. Neurologic: CNII-XII intact. No focal weakness            Data Review:     Recent Days:  Recent Labs     11/15/19  1418   WBC 6.5   HGB 12.0   HCT 39.9        Recent Labs     11/15/19  1418      K 4.8      CO2 30   GLU 98   BUN 25*   CREA 1.43*   CA 9.0   INR 2.5*     No results for input(s): PH, PCO2, PO2, HCO3, FIO2 in the last 72 hours.     24 Hour Results:  Recent Results (from the past 24 hour(s))   EKG, 12 LEAD, INITIAL    Collection Time: 11/15/19  2:08 PM   Result Value Ref Range    Ventricular Rate 96 BPM    Atrial Rate 87 BPM    QRS Duration 96 ms    Q-T Interval 372 ms    QTC Calculation (Bezet) 469 ms    Calculated R Axis 180 degrees    Calculated T Axis 35 degrees    Diagnosis       Atrial fibrillation  Low voltage QRS  When compared with ECG of 18-DEC-2017 12:55,  QT has lengthened     METABOLIC PANEL, BASIC    Collection Time: 11/15/19  2:18 PM   Result Value Ref Range    Sodium 142 136 - 145 mmol/L    Potassium 4.8 3.5 - 5.1 mmol/L    Chloride 107 97 - 108 mmol/L    CO2 30 21 - 32 mmol/L    Anion gap 5 5 - 15 mmol/L    Glucose 98 65 - 100 mg/dL    BUN 25 (H) 6 - 20 MG/DL    Creatinine 1.43 (H) 0.55 - 1.02 MG/DL    BUN/Creatinine ratio 17 12 - 20      GFR est AA 43 (L) >60 ml/min/1.73m2    GFR est non-AA 35 (L) >60 ml/min/1.73m2    Calcium 9.0 8.5 - 10.1 MG/DL   CBC WITH AUTOMATED DIFF    Collection Time: 11/15/19  2:18 PM   Result Value Ref Range    WBC 6.5 3.6 - 11.0 K/uL    RBC 4.09 3.80 - 5.20 M/uL    HGB 12.0 11.5 - 16.0 g/dL    HCT 39.9 35.0 - 47.0 %    MCV 97.6 80.0 - 99.0 FL    MCH 29.3 26.0 - 34.0 PG    MCHC 30.1 30.0 - 36.5 g/dL    RDW 17.3 (H) 11.5 - 14.5 %    PLATELET 900 081 - 854 K/uL    MPV 10.1 8.9 - 12.9 FL    NRBC 0.0 0  WBC    ABSOLUTE NRBC 0.00 0.00 - 0.01 K/uL    NEUTROPHILS 72 32 - 75 %    LYMPHOCYTES 13 12 - 49 %    MONOCYTES 11 5 - 13 %    EOSINOPHILS 3 0 - 7 %    BASOPHILS 1 0 - 1 %    IMMATURE GRANULOCYTES 0 0.0 - 0.5 %    ABS. NEUTROPHILS 4.7 1.8 - 8.0 K/UL    ABS. LYMPHOCYTES 0.8 0.8 - 3.5 K/UL    ABS. MONOCYTES 0.7 0.0 - 1.0 K/UL    ABS. EOSINOPHILS 0.2 0.0 - 0.4 K/UL    ABS. BASOPHILS 0.0 0.0 - 0.1 K/UL    ABS. IMM. GRANS. 0.0 0.00 - 0.04 K/UL    DF AUTOMATED     SAMPLES BEING HELD    Collection Time: 11/15/19  2:18 PM   Result Value Ref Range    SAMPLES BEING HELD 1RED,1BLUE,BLD CUR LAV,1BLD CUR BLUE     COMMENT        Add-on orders for these samples will be processed based on acceptable specimen integrity and analyte stability, which may vary by analyte. PROTHROMBIN TIME + INR    Collection Time: 11/15/19  2:18 PM   Result Value Ref Range    INR 2.5 (H) 0.9 - 1.1      Prothrombin time 24.3 (H) 9.0 - 11.1 sec   NT-PRO BNP    Collection Time: 11/15/19  2:18 PM   Result Value Ref Range    NT pro-BNP 6,730 (H) <450 PG/ML         Imaging:   Xr Chest Port    Result Date: 11/15/2019  IMPRESSION: Stable cardiac silhouette enlargement. There is mild central pulmonary vascular congestion without overt pulmonary edema. Assessment:     Active Problems:    Acute on chronic diastolic heart failure ([address] (10/6/2017)      Hypervolemia (11/15/2019)      Bilateral leg edema (11/15/2019)           Plan:     1. Acute on chronic BV diastolic congestive heart failure: fluid overload due to her stopping her home diuretics. received 80mg iv lasix in ER. Will continue 80mg q12h. Following weight, output. Repeat echo.    2. CKD stage 3: should monitor cr when on diuretics   3. Chronic bilateral lymphedema with wound: wound care consult   4. Chronic afib: continue coumadin. Rate controlled   5.  Morbid obese: monitor weight   Functional status: very debility, bedbound last several weeks   Signed By: Rachelle Garza MD     November 15, 2019

## 2019-11-15 NOTE — PROGRESS NOTES
CM Consulted for chronic lymphedema, limited mobility, wounds    Reason for Admission:                  RRAT Score:     23/0/4   Core Measure             Resources/supports as identified by patient/family:   Open to home health with [facility] RN and Physical Therapy. Spouse, Valarie Espinal Blaine) [phone] is primary caretaker. Mrs. Lucila Garber endorses she is in the recliner 24 hours day. She is dependent on spouse for all ADLS. Top Challenges facing patient (as identified by patient/family and CM): Finances/Medication cost?      [employer] and [employer] , no identified financial barriers. Spouse reports that the bathrooms were made handicap accessible approximately 2 months ago. Transportation? Spouse can drive to and from appointments however she has been unable to tolerate sitting in the car and mobility/transfer from recliner/motorized wheelchair. She is unable to sit in MD office chair/waiting room. Discussed remaining in motorized wheelchair in MD office to facilitate conservation of energy. ??if he is able to get her out of car?? Options including [facility]/wheelchair transport              Support system or lack thereof? Spouse is only support system, limited to NO outside contacts. Home Health RN and PT/[facility] in the home                     Living arrangements? Lives with spouse, one story home. Dependent for all care           Self-care/ADLs/Cognition? Dependent for ADL, she is alert and oriented, appears to have some understanding of the lymphedema and her current medical situation. She endorses ED visit of 11/5/19 without prompting.             Current Advanced Directive/Advance Care Plan:  No advance directive on file, she doesn't wish to discuss                          Plan for utilizing home health:    Open to [facility] since discharge from [facility] [facility] [address], RN, BSN, [facility]  ED Care Management  [phone]

## 2019-11-16 ENCOUNTER — APPOINTMENT (OUTPATIENT)
Dept: NON INVASIVE DIAGNOSTICS | Age: 79
DRG: 291 | End: 2019-11-16
Attending: HOSPITALIST
Payer: MEDICARE

## 2019-11-16 LAB
ALBUMIN SERPL-MCNC: 3.4 G/DL (ref 3.5–5)
ALBUMIN/GLOB SERPL: 1.1 {RATIO} (ref 1.1–2.2)
ALP SERPL-CCNC: 139 U/L (ref 45–117)
ALT SERPL-CCNC: 14 U/L (ref 12–78)
ANION GAP SERPL CALC-SCNC: 3 MMOL/L (ref 5–15)
AST SERPL-CCNC: 11 U/L (ref 15–37)
AV VELOCITY RATIO: 0.62
BASOPHILS # BLD: 0.1 K/UL (ref 0–0.1)
BASOPHILS NFR BLD: 1 % (ref 0–1)
BILIRUB SERPL-MCNC: 1.2 MG/DL (ref 0.2–1)
BUN SERPL-MCNC: 26 MG/DL (ref 6–20)
BUN/CREAT SERPL: 18 (ref 12–20)
CALCIUM SERPL-MCNC: 9 MG/DL (ref 8.5–10.1)
CHLORIDE SERPL-SCNC: 108 MMOL/L (ref 97–108)
CO2 SERPL-SCNC: 30 MMOL/L (ref 21–32)
CREAT SERPL-MCNC: 1.47 MG/DL (ref 0.55–1.02)
DIFFERENTIAL METHOD BLD: ABNORMAL
ECHO AO ROOT DIAM: 3.06 CM
ECHO AV AREA PEAK VELOCITY: 1.5 CM2
ECHO AV PEAK GRADIENT: 5.7 MMHG
ECHO AV PEAK VELOCITY: 119.39 CM/S
ECHO EST RA PRESSURE: 20 MMHG
ECHO LA AREA 4C: 25 CM2
ECHO LA MAJOR AXIS: 4.37 CM
ECHO LA TO AORTIC ROOT RATIO: 1.43
ECHO LA VOL 2C: 88.57 ML (ref 22–52)
ECHO LA VOL 4C: 65.15 ML (ref 22–52)
ECHO LA VOL BP: 79.35 ML (ref 22–52)
ECHO LA VOL/BSA BIPLANE: 28.58 ML/M2 (ref 16–28)
ECHO LA VOLUME INDEX A2C: 31.9 ML/M2 (ref 16–28)
ECHO LA VOLUME INDEX A4C: 23.47 ML/M2 (ref 16–28)
ECHO LV INTERNAL DIMENSION DIASTOLIC: 4.81 CM (ref 3.9–5.3)
ECHO LV INTERNAL DIMENSION SYSTOLIC: 3.26 CM
ECHO LV IVSD: 0.91 CM (ref 0.6–0.9)
ECHO LV MASS 2D: 194.1 G (ref 67–162)
ECHO LV MASS INDEX 2D: 69.9 G/M2 (ref 43–95)
ECHO LV POSTERIOR WALL DIASTOLIC: 1.06 CM (ref 0.6–0.9)
ECHO LVOT DIAM: 1.77 CM
ECHO LVOT PEAK GRADIENT: 2.2 MMHG
ECHO LVOT PEAK VELOCITY: 73.72 CM/S
ECHO PV MAX VELOCITY: 80.57 CM/S
ECHO PV PEAK GRADIENT: 2.6 MMHG
ECHO RIGHT VENTRICULAR SYSTOLIC PRESSURE (RVSP): 39.4 MMHG
ECHO RV INTERNAL DIMENSION: 5.66 CM
ECHO RV TAPSE: 0.84 CM (ref 1.5–2)
ECHO TV REGURGITANT MAX VELOCITY: 39.35 CM/S
ECHO TV REGURGITANT PEAK GRADIENT: 19.3 MMHG
EOSINOPHIL # BLD: 0.3 K/UL (ref 0–0.4)
EOSINOPHIL NFR BLD: 5 % (ref 0–7)
ERYTHROCYTE [DISTWIDTH] IN BLOOD BY AUTOMATED COUNT: 17.1 % (ref 11.5–14.5)
GLOBULIN SER CALC-MCNC: 3.1 G/DL (ref 2–4)
GLUCOSE SERPL-MCNC: 117 MG/DL (ref 65–100)
HCT VFR BLD AUTO: 37.3 % (ref 35–47)
HGB BLD-MCNC: 11.4 G/DL (ref 11.5–16)
IMM GRANULOCYTES # BLD AUTO: 0 K/UL (ref 0–0.04)
IMM GRANULOCYTES NFR BLD AUTO: 0 % (ref 0–0.5)
INR PPP: 2.6 (ref 0.9–1.1)
LVFS 2D: 32.27 %
LYMPHOCYTES # BLD: 1.1 K/UL (ref 0.8–3.5)
LYMPHOCYTES NFR BLD: 15 % (ref 12–49)
MAGNESIUM SERPL-MCNC: 1.9 MG/DL (ref 1.6–2.4)
MCH RBC QN AUTO: 29 PG (ref 26–34)
MCHC RBC AUTO-ENTMCNC: 30.6 G/DL (ref 30–36.5)
MCV RBC AUTO: 94.9 FL (ref 80–99)
MONOCYTES # BLD: 0.8 K/UL (ref 0–1)
MONOCYTES NFR BLD: 11 % (ref 5–13)
NEUTS SEG # BLD: 4.9 K/UL (ref 1.8–8)
NEUTS SEG NFR BLD: 68 % (ref 32–75)
NRBC # BLD: 0 K/UL (ref 0–0.01)
NRBC BLD-RTO: 0 PER 100 WBC
PHOSPHATE SERPL-MCNC: 3.1 MG/DL (ref 2.6–4.7)
PLATELET # BLD AUTO: 198 K/UL (ref 150–400)
PMV BLD AUTO: 10 FL (ref 8.9–12.9)
POTASSIUM SERPL-SCNC: 4.3 MMOL/L (ref 3.5–5.1)
PROT SERPL-MCNC: 6.5 G/DL (ref 6.4–8.2)
PROTHROMBIN TIME: 25.4 SEC (ref 9–11.1)
RBC # BLD AUTO: 3.93 M/UL (ref 3.8–5.2)
SODIUM SERPL-SCNC: 141 MMOL/L (ref 136–145)
WBC # BLD AUTO: 7.2 K/UL (ref 3.6–11)

## 2019-11-16 PROCEDURE — 83735 ASSAY OF MAGNESIUM: CPT

## 2019-11-16 PROCEDURE — 85610 PROTHROMBIN TIME: CPT

## 2019-11-16 PROCEDURE — 65270000029 HC RM PRIVATE

## 2019-11-16 PROCEDURE — C8929 TTE W OR WO FOL WCON,DOPPLER: HCPCS

## 2019-11-16 PROCEDURE — 84100 ASSAY OF PHOSPHORUS: CPT

## 2019-11-16 PROCEDURE — 36415 COLL VENOUS BLD VENIPUNCTURE: CPT

## 2019-11-16 PROCEDURE — 97530 THERAPEUTIC ACTIVITIES: CPT

## 2019-11-16 PROCEDURE — 74011250637 HC RX REV CODE- 250/637: Performed by: HOSPITALIST

## 2019-11-16 PROCEDURE — 80053 COMPREHEN METABOLIC PANEL: CPT

## 2019-11-16 PROCEDURE — 97162 PT EVAL MOD COMPLEX 30 MIN: CPT

## 2019-11-16 PROCEDURE — 85025 COMPLETE CBC W/AUTO DIFF WBC: CPT

## 2019-11-16 PROCEDURE — 74011250636 HC RX REV CODE- 250/636: Performed by: HOSPITALIST

## 2019-11-16 PROCEDURE — P9047 ALBUMIN (HUMAN), 25%, 50ML: HCPCS | Performed by: HOSPITALIST

## 2019-11-16 RX ORDER — ALBUMIN HUMAN 250 G/1000ML
12.5 SOLUTION INTRAVENOUS EVERY 6 HOURS
Status: COMPLETED | OUTPATIENT
Start: 2019-11-16 | End: 2019-11-17

## 2019-11-16 RX ORDER — LEVOTHYROXINE SODIUM 112 UG/1
112 TABLET ORAL
Status: DISCONTINUED | OUTPATIENT
Start: 2019-11-17 | End: 2019-11-20 | Stop reason: HOSPADM

## 2019-11-16 RX ORDER — METFORMIN HYDROCHLORIDE 500 MG/1
500 TABLET ORAL
Status: DISCONTINUED | OUTPATIENT
Start: 2019-11-16 | End: 2019-11-18

## 2019-11-16 RX ADMIN — ACETAMINOPHEN 650 MG: 325 TABLET, FILM COATED ORAL at 02:45

## 2019-11-16 RX ADMIN — MICONAZOLE NITRATE 2 % TOPICAL POWDER: at 09:00

## 2019-11-16 RX ADMIN — WARFARIN SODIUM 2.5 MG: 2 TABLET ORAL at 18:07

## 2019-11-16 RX ADMIN — ALBUMIN (HUMAN) 12.5 G: 0.25 INJECTION, SOLUTION INTRAVENOUS at 18:17

## 2019-11-16 RX ADMIN — Medication 10 ML: at 21:12

## 2019-11-16 RX ADMIN — ALBUMIN (HUMAN) 12.5 G: 0.25 INJECTION, SOLUTION INTRAVENOUS at 14:50

## 2019-11-16 RX ADMIN — PANTOPRAZOLE SODIUM 40 MG: 40 TABLET, DELAYED RELEASE ORAL at 07:30

## 2019-11-16 RX ADMIN — MICONAZOLE NITRATE 2 % TOPICAL POWDER: at 18:22

## 2019-11-16 RX ADMIN — METFORMIN HYDROCHLORIDE 500 MG: 500 TABLET ORAL at 18:07

## 2019-11-16 RX ADMIN — GABAPENTIN 400 MG: 400 CAPSULE ORAL at 21:12

## 2019-11-16 RX ADMIN — SALINE NASAL SPRAY 2 SPRAY: 1.5 SOLUTION NASAL at 22:15

## 2019-11-16 RX ADMIN — VENLAFAXINE HYDROCHLORIDE 75 MG: 37.5 CAPSULE, EXTENDED RELEASE ORAL at 07:30

## 2019-11-16 RX ADMIN — ACETAMINOPHEN 650 MG: 325 TABLET, FILM COATED ORAL at 23:25

## 2019-11-16 RX ADMIN — Medication 10 ML: at 06:04

## 2019-11-16 RX ADMIN — PERFLUTREN 2 ML: 6.52 INJECTION, SUSPENSION INTRAVENOUS at 11:37

## 2019-11-16 RX ADMIN — Medication 10 ML: at 18:19

## 2019-11-16 RX ADMIN — Medication 10 ML: at 16:44

## 2019-11-16 RX ADMIN — CARVEDILOL 12.5 MG: 12.5 TABLET, FILM COATED ORAL at 07:39

## 2019-11-16 RX ADMIN — ALLOPURINOL 100 MG: 100 TABLET ORAL at 09:26

## 2019-11-16 RX ADMIN — ALBUMIN (HUMAN) 12.5 G: 0.25 INJECTION, SOLUTION INTRAVENOUS at 23:18

## 2019-11-16 RX ADMIN — Medication 10 ML: at 14:52

## 2019-11-16 NOTE — PROGRESS NOTES
Hospitalist Progress Note  Yaneth Guidry MD  Answering service: [phone] from in house phone        Date of Service:  2019  NAME:  Shahrzad Poole  :  1940  MRN:  [id number]      Admission Summary:   Shahrzad Poole is a 67 y.o. female who presents with increasing leg swelling, sob      67 y.o. female with past medical history significant for obesity, lymphedema, elevated antinuclear antibody level, pleural effusion,  HTN, a-fib,  hearing loss, CHF, leg swelling, COPD, gout,  CKD, hypothyroidism, GERD, sleep apnea,morbid obesity, and breast cancer who presents from home via EMS with chief complaint of leg swelling and sob. Patient presents to ED with worsening SOB and lymphedema. Patient states her leg swelling is increasing and now has onset of blisters to both her legs bilaterally. Patient notes she has an old laceration to her right shin with old stiches still in place. Patient states due to her lymphedema she is unable to ambulate without heavy assistance. Patient reports she has stopped taking her Lasix because of her lack of mobility to use the bathroom when she takes Lasix. Patient notes she was previously seen in the hospital for extreme lymphedema, when she weighed over 400 lbs and was brought down to 300 lbs after fluid removal, while stating she is back to over 400 lbs today. Patient also complains of becoming easily SOB when she is stressed or overactive. Patient states her SOB has become increasingly prevalent over the past month. Patient affirms she still takes coumadin. Patient affirms SOB, leg swelling, weakness, and wounds. Patient denies fever and chills. There are no other acute medical concerns at this time.     Interval history / Subjective:     F/u Bilateral pedal edema/KENT   Still has KENT  Assessment & Plan:     SOB/Pedal edema  -sec to Acute on chronic BV diastolic congestive heart failure vs lymphedema:   -fluid overload due to her stopping her home diuretics  -Continue IV diuresis if possible  -I/O  -Daily weight  -Follow echo  -Appreciate Cardiology     CKD stage 3  -Monitor    Chronic bilateral lymphedema with wound:   -wound care consult     Chronic afib:   -continue coumadin. Rate controlled   -Appreciate Cardiology    Reported medication non compliance  -Counseled    Morbid obese  -Nutrition input    Cardiac diet    Code status: FULL CODE  DVT prophylaxis: Coumadin    Care Plan discussed with: Patient/Family  Disposition: TBD     Hospital Problems  Date Reviewed: 11/16/2019          Codes Class Noted POA    * (Principal) Hypervolemia ICD-10-CM: E87.70  ICD-9-CM: 276.69  11/15/2019 Unknown        Bilateral leg edema ICD-10-CM: R60.0  ICD-9-CM: 782.3  11/15/2019 Unknown        Acute on chronic diastolic heart failure ([facility] [address] ICD-10-CM: I50.33  ICD-9-CM: 428.33  10/6/2017 Yes                Review of Systems:   A comprehensive review of systems was negative except for that written in the HPI. Vital Signs:    Last 24hrs VS reviewed since prior progress note. Most recent are:  Visit Vitals  BP 98/66   Pulse (!) 102   Temp 97.7 °F (36.5 °C)   Resp 16   Ht 5' 9\" (1.753 m)   Wt (!) 181.9 kg (401 lb)   SpO2 93%   BMI 59.22 kg/m²         Intake/Output Summary (Last 24 hours) at 11/16/2019 1323  Last data filed at 11/15/2019 1847  Gross per 24 hour   Intake 300 ml   Output    Net 300 ml        Physical Examination:             Constitutional:  No acute distress, cooperative, pleasant    ENT:  Oral mucous moist, oropharynx benign. Resp:  CTA bilaterally. No wheezing/rhonchi/rales. No accessory muscle use   CV:  Regular rhythm, normal rate, no murmurs, gallops, rubs    GI:  Soft, non distended, non tender. normoactive bowel sounds, no hepatosplenomegaly     Musculoskeletal:  Bilateral pedal edema 3+, warm, 2+ pulses throughout    Neurologic:  Moves all extremities.   AAOx3, CN II-XII reviewed     Skin:  Good turgor, no rashes or ulcers       Data Review:    Review and/or order of clinical lab test      Labs:     Recent Labs     11/16/19 0256 11/15/19  1418   WBC 7.2 6.5   HGB 11.4* 12.0   HCT 37.3 39.9    200     Recent Labs     11/16/19 0256 11/15/19  1418    142   K 4.3 4.8    107   CO2 30 30   BUN 26* 25*   CREA 1.47* 1.43*   * 98   CA 9.0 9.0   MG 1.9  --    PHOS 3.1  --      Recent Labs     11/16/19 0256   SGOT 11*   ALT 14   *   TBILI 1.2*   TP 6.5   ALB 3.4*   GLOB 3.1     Recent Labs     11/16/19  0256 11/15/19  1418   INR 2.6* 2.5*   PTP 25.4* 24.3*      No results for input(s): FE, TIBC, PSAT, FERR in the last 72 hours. No results found for: FOL, RBCF   No results for input(s): PH, PCO2, PO2 in the last 72 hours. No results for input(s): CPK, CKNDX, TROIQ in the last 72 hours.     No lab exists for component: CPKMB  No results found for: CHOL, CHOLX, CHLST, CHOLV, HDL, HDLP, LDL, LDLC, DLDLP, TGLX, TRIGL, TRIGP, CHHD, CHHDX  Lab Results   Component Value Date/Time    Glucose (POC) 98 10/07/2017 11:07 AM    Glucose (POC) 92 10/07/2017 06:50 AM    Glucose (POC) 169 (H) 12/05/2016 04:43 PM    Glucose (POC) 126 (H) 12/05/2016 12:15 PM    Glucose (POC) 112 (H) 12/05/2016 08:23 AM     Lab Results   Component Value Date/Time    Color DARK YELLOW 12/02/2016 05:46 PM    Appearance TURBID (A) 12/02/2016 05:46 PM    Specific gravity 1.022 12/02/2016 05:46 PM    Specific gravity 1.015 03/16/2012 01:55 AM    pH (UA) 5.0 12/02/2016 05:46 PM    Protein 30 (A) 12/02/2016 05:46 PM    Glucose NEGATIVE  12/02/2016 05:46 PM    Ketone TRACE (A) 12/02/2016 05:46 PM    Bilirubin NEGATIVE  07/08/2012 12:10 AM    Urobilinogen 1.0 12/02/2016 05:46 PM    Nitrites NEGATIVE  12/02/2016 05:46 PM    Leukocyte Esterase NEGATIVE  12/02/2016 05:46 PM    Epithelial cells MANY (A) 12/02/2016 05:46 PM    Bacteria 1+ (A) 12/02/2016 05:46 PM    WBC 0-4 12/02/2016 05:46 PM    RBC 0-5 12/02/2016 05:46 PM         Medications Reviewed:     Current Facility-Administered Medications   Medication Dose Route Frequency    warfarin (COUMADIN) tablet 2.5 mg  2.5 mg Oral ONCE    allopurinol (ZYLOPRIM) tablet 100 mg  100 mg Oral DAILY    carvedilol (COREG) tablet 12.5 mg  12.5 mg Oral BID WITH MEALS    docusate sodium (COLACE) capsule 100 mg  100 mg Oral BID PRN    furosemide (LASIX) injection 80 mg  80 mg IntraVENous BID    gabapentin (NEURONTIN) capsule 400 mg  400 mg Oral QHS    pantoprazole (PROTONIX) tablet 40 mg  40 mg Oral ACB    miconazole (MICOTIN) 2 % powder   Topical BID    spironolactone (ALDACTONE) tablet 25 mg  25 mg Oral DAILY    sodium chloride (NS) flush 5-40 mL  5-40 mL IntraVENous Q8H    sodium chloride (NS) flush 5-40 mL  5-40 mL IntraVENous PRN    naloxone (NARCAN) injection 0.4 mg  0.4 mg IntraVENous PRN    zolpidem (AMBIEN) tablet 5 mg  5 mg Oral QHS PRN    acetaminophen (TYLENOL) tablet 650 mg  650 mg Oral Q4H PRN    ondansetron (ZOFRAN) injection 4 mg  4 mg IntraVENous Q4H PRN    venlafaxine-SR (EFFEXOR-XR) capsule 75 mg  75 mg Oral DAILY WITH BREAKFAST    Warfarin - pharmacy to dose   Other Rx Dosing/Monitoring     ______________________________________________________________________  EXPECTED LENGTH OF STAY: - - -  ACTUAL LENGTH OF STAY:          1                 Hank Couch MD

## 2019-11-16 NOTE — PROGRESS NOTES
Pharmacist Note  Warfarin Dosing  Consult provided for this 67 y. o.female to manage warfarin for Atrial Fibrillation    INR Goal: 2 - 3    Home regimen/ tablet size: 2.5 mg daily, except 5 mg on Mondays    Drugs that may increase INR: None  Drugs that may decrease INR: None  Other current anticoagulants/ drugs that may increase bleeding risk: allopurinol  Risk factors: Age > 65  Daily INR ordered: YES    Recent Labs     11/16/19  0256 11/15/19  1418   HGB 11.4* 12.0   INR 2.6* 2.5*     Date               INR                  Dose  11/15  2.5  2.5   11/16  2.6  2.5                                                                                Assessment/ Plan: Will order warfarin 2.5 mg PO x 1 dose. Pharmacy will continue to monitor daily and adjust therapy as indicated. Please contact the pharmacist at   for outpatient recommendations if needed.      Noam Hernandez, PharmD, BCPP, [facility] Specialist, [address] [place] [address]

## 2019-11-16 NOTE — PROGRESS NOTES
Bedside shift change report given to HUDSON Burk (oncoming nurse) by Socrates Narayanan RN (offgoing nurse). Report included the following information SBAR, Kardex, Intake/Output and MAR.

## 2019-11-16 NOTE — PROGRESS NOTES
PT chart reviewed and orders acknowledged, pt is off the floor currently for ECHO per RN, will attempt pt later as scheduling allows.      Mayco Peralta, CHRIST, PT

## 2019-11-16 NOTE — PROGRESS NOTES
Patient scoring MEWS of 3 , BP 85/52. The patient is not symptomatic at this time. This nurse to call provider. 3591: Spoke with Dr. Adele Beasley, this nurse to hold lasix at this time. Dr. Adele Beasley will be up to assess the patient and formulate a plan moving forward.

## 2019-11-16 NOTE — CONSULTS
Cardiology Consultation Note     Subjective:      Thao Bennett is a 67 y.o. patient who is seen for evaluation of fluid overload, congestive heart failure. The patient have a history of atrial fibrillation. She had seen Dr. Jackie Scherer 2 years ago. There were records of cardiologist Dr Kaiser/Dr Radha Garcia from the [place] [facility] after she saw Dr Jackie Scherer. Now she want to transfer back to Dr. Jackie Scherer. The patient had a previous history of left ventricular ejection fraction 45% on 2D echocardiogram October 2017. There was report of moderate mitral valve regurgitation and aortic valve regurgitation but mild tricuspid regurgitation. Today on prelim 2D echocardiogram she has severe tricuspid valve regurgitation. She has chronic lower extremity edema that was considered to be lymphedema in the past and chronic kidney disease. She did not have DVT in September venous duplex.   Her symptom is mainly fatigue and orthopnea     Patient Active Problem List   Diagnosis Code    Other dyspnea and respiratory abnormality R06.09, R09.89    Other lymphedema I89.0    Morbid obesity with BMI of 50.0-59.9, adult ([facility]) E66.01, Z68.43    Lymphedema I89.0    Atrial fibrillation ([facility]) I48.91    Hypertensive heart disease with heart failure ([facility]) I11.0    Pleural effusion J90    Elevated antinuclear antibody (MARYAM) level R76.8    Acute on chronic renal failure ([facility]) N17.9, N18.9    Unspecified hypothyroidism E03.9    Chronic airway obstruction, not elsewhere classified J44.9    Anemia D64.9    ARF (acute renal failure) ([facility]) N17.9    Diarrhea R19.7    Complete heart block ([facility]) I44.2    Sepsis(995.91) A41.9    Intestinal infection due to Clostridium difficile C43.29    Diastolic CHF, acute on chronic ([facility]) I50.33    Hypokalemia E87.6    CKD (chronic kidney disease) stage 3, GFR 30-59 ml/min ([facility]) N18.3    Recurrent epistaxis Q26.2    Diastolic CHF, chronic ([facility]) I50.32    Atrial fibrillation (HCC) I48.91    CKD (chronic kidney disease) stage 3, GFR 30-59 ml/min (HCC) N18.3    Hypovolemia E86.1    Hypokalemia E87.6    History of complete heart block Z86.79    History of Clostridium difficile infection Z86.19    Breast CA (HCC) C50.919    Physical deconditioning R53.81    A-fib (HCC) I48.91    Acute on chronic diastolic heart failure (HCC) I50.33    Hypervolemia E87.70    Bilateral leg edema R60.0     Current Facility-Administered Medications   Medication Dose Route Frequency Provider Last Rate Last Dose    warfarin (COUMADIN) tablet 2.5 mg  2.5 mg Oral Elaina Polanco MD        allopurinol (ZYLOPRIM) tablet 100 mg  100 mg Oral DAILY Romana Junior, MD   100 mg at 11/16/19 4571    carvedilol (COREG) tablet 12.5 mg  12.5 mg Oral BID WITH MEALS Romana Junior, MD   12.5 mg at 11/16/19 5614    docusate sodium (COLACE) capsule 100 mg  100 mg Oral BID PRN Romana Junior, MD        furosemide (LASIX) injection 80 mg  80 mg IntraVENous BID Romana Junior, MD   Stopped at 11/16/19 0900    gabapentin (NEURONTIN) capsule 400 mg  400 mg Oral QHS Elaina Garcia MD   400 mg at 11/15/19 2121    pantoprazole (PROTONIX) tablet 40 mg  40 mg Oral ACB Romana Junior, MD   40 mg at 11/16/19 0730    miconazole (MICOTIN) 2 % powder   Topical BID Romana Junior, MD   Stopped at 11/15/19 1900    spironolactone (ALDACTONE) tablet 25 mg  25 mg Oral DAILY Romana Junior, MD   Stopped at 11/16/19 0900    sodium chloride (NS) flush 5-40 mL  5-40 mL IntraVENous Q8H Romana Junior, MD   10 mL at 11/16/19 0604    sodium chloride (NS) flush 5-40 mL  5-40 mL IntraVENous PRN Romana Junior, MD        naloxone [facility]) injection 0.4 mg  0.4 mg IntraVENous PRN Romana Junior, MD        zolpidem [facility]) tablet 5 mg  5 mg Oral QHS PRN Romana Junior, MD        acetaminophen (TYLENOL) tablet 650 mg  650 mg Oral Q4H PRN Romana Junior, MD   650 mg at 11/16/19 0245    ondansetron (ZOFRAN) injection 4 mg  4 mg IntraVENous Q4H PRN Romana Junior, MD        venlafaxine-SR [facility] capsule 75 mg  75 mg Oral DAILY WITH Julian Poole MD   75 mg at 19 0730    Warfarin - pharmacy to dose   Other Rx Dosing/Monitoring Nikhil Edward MD         Allergies   Allergen Reactions    Latex Rash    Grass Pollen-[place], Standard Unknown (comments)    Sulfa (Sulfonamide Antibiotics) Nausea and Vomiting     Past Medical History:   Diagnosis Date    Arrhythmia     A Fib    Arthritis     Atrial fibrillation (HCC)     Cancer (HCC)     left breast cancer    Cardiomegaly     CKD (chronic kidney disease) stage 3, GFR 30-59 ml/min (HCC)     judith    Clostridium difficile infection     Congestive heart failure, unspecified     COPD     Diarrhea     /constipation    Dyspepsia and other specified disorders of function of stomach     Elevated antinuclear antibody (MARYAM) level     Endocrine disease     hypothyroidism    GERD (gastroesophageal reflux disease)     Hearing loss     Heart failure (HCC)     Hypertension     Hypertensive heart disease with heart failure (HCC)     diastolic dysfunction    Joint pain     Leg swelling     Long term (current) use of anticoagulants     Lymphedema     Morbid obesity (HCC)     Obesity, unspecified     Other ill-defined conditions(799.89)     lymph edema    Pleural effusion     s/p VATS    Shortness of breath     Thyroid disease     Unspecified sleep apnea     Does not use machine     Past Surgical History:   Procedure Laterality Date    BREAST SURGERY PROCEDURE UNLISTED  13    LEFT BREAST MASTECTOMY SENTINEL NODE BIOPSY      HX BREAST BIOPSY      left    HX BREAST BIOPSY      fibroids removed right breast    HX  SECTION      times 2    HX KNEE ARTHROSCOPY      left knee     HX MODIFIED RADICAL MASTECTOMY  2013    BREAST MASTECTOMY MODIFIED RADICAL [facility]    HX OTHER SURGICAL      pleural effusion    HX RHINOPLASTY      HX DOMI AND BSO      HX TONSILLECTOMY       Family History   Problem Relation Age of Onset    Lung Disease Mother         Emphysema    Heart Disease Mother     Hypertension Mother     Cancer Father         Lung Cancer    Heart Disease Father     Hypertension Father     Cancer Paternal Aunt         colon     Social History     Tobacco Use    Smoking status: Never Smoker    Smokeless tobacco: Never Used   Substance Use Topics    Alcohol use: No        Review of Systems:   Constitutional: Negative for fever, chills, weight loss, + malaise/fatigue. HEENT: Negative for nosebleeds, vision changes. Respiratory: Negative for cough, hemoptysis  Cardiovascular: Negative for chest pain, palpitations, + orthopnea, leg swelling, no syncope, and PND. Gastrointestinal: Negative for nausea, vomiting, diarrhea, blood in stool and melena. Genitourinary: Negative for dysuria, and hematuria. Musculoskeletal: Negative for myalgias, + leg arthralgia. Skin: + drainage  Heme: bruise easily. On coumadin  Neurological: Negative for speech change and focal weakness     Objective:     Visit Vitals  BP 90/53   Pulse (!) 104   Temp 97.9 °F (36.6 °C)   Resp 17   Ht 5' 9\" (1.753 m)   Wt (!) 401 lb (181.9 kg)   SpO2 92%   BMI 59.22 kg/m²      Physical Exam:   Constitutional: well-developed and well-nourished. No respiratory distress. Head: Normocephalic and atraumatic. Eyes: Pupils are equal, round  ENT: hearing normal  Neck: supple. No JVD present. Cardiovascular: fast rate, irregular rhythm. 2/6 systolic RLSB murmur heard. Pulmonary/Chest: Effort normal No wheezes. Abdominal: morbidly obese  Musculoskeletal: 4+ edema. Neurological: alert,oriented. Skin: warm  Psychiatric: normal mood and affect. Behavior is normal. Judgment and thought content normal.      EKG: atrial fibrillation. There is low voltage QRS complexes and poor R wave progression atypical incomplete rbbb or IVCD      Assessment/Plan:   The patient has severe tricuspid valve regurgitation, severe leg edema.   She likely has pulmonary hypertension   Morbid obesity  Hx breast cancer  CKD stage 3    I agree that she should be aggressively diuresed while monitoring her renal function. Currently she is getting Lasix 80 mg IV twice daily and also spironolactone. She does not have good urine output documentation so will have to follow her weight. I have discussed with her briefly about the possibility of valvular repair. She is not likely a candidate for open heart surgery given her weight. Continue with coumadin for stroke prevention. INR is therapeutic  Consider CPAP if she is not using it yet  Blood pressure is low side so have to accept HR < 110 bpm goal during AFIB  Dr. Magdalena Nunez will assume care on Monday. Thank you for involving me in this patient's care and please call with further concerns or questions. Tung Mahoney M.D.   Electrophysiology/Cardiology  [facility] [place]  [address] [place] [address]                             [phone]

## 2019-11-16 NOTE — PROGRESS NOTES
Pharmacist Note - Warfarin Dosing  Consult provided for this 67 y. o.female to manage warfarin for Atrial Fibrillation    INR Goal: 2 - 3    Home regimen/ tablet size: 2.5 mg daily, except 5 mg on Mondays    Drugs that may increase INR: None  Drugs that may decrease INR: None  Other current anticoagulants/ drugs that may increase bleeding risk: allopurinol  Risk factors: Age > 65  Daily INR ordered: YES    Recent Labs     11/15/19  1418   HGB 12.0   INR 2.5*     Date               INR                  Dose  11/15  2.5  2.5                                                                                Assessment/ Plan: Will order warfarin 2.5 mg PO x 1 dose. Pharmacy will continue to monitor daily and adjust therapy as indicated. Please contact the pharmacist at   for outpatient recommendations if needed.

## 2019-11-16 NOTE — PROGRESS NOTES
Bedside shift change report given to Rashad Orellana (oncoming nurse) by Letha Sherman (offgoing nurse). Report included the following information SBAR.

## 2019-11-16 NOTE — PROGRESS NOTES
Patient has active nose bleed from left nare. Notified Team 3/Dr. Ladi Robledo. Patient states, \"I blew my nose and it started bleeding. \"

## 2019-11-16 NOTE — PROGRESS NOTES
Problem: Mobility Impaired (Adult and Pediatric)  Goal: *Acute Goals and Plan of Care (Insert Text)  Description  FUNCTIONAL STATUS PRIOR TO ADMISSION: Patient was fully dependent and non ambulatory/not transferring for 6 weeks. HOME SUPPORT PRIOR TO ADMISSION: The patient lived with family at unknown level of support. Physical Therapy Goals  Initiated 11/16/2019  1. Patient will move from supine to sit and sit to supine , scoot up and down and roll side to side in bed with maximal assistance within 7 day(s). 2.  Patient will transfer from bed to chair and chair to bed with maximal assistance using the least restrictive device within 7 day(s). 3.  Patient will perform AROM to BLE within 7 day(s). Outcome: Progressing Towards Goal     PHYSICAL THERAPY EVALUATION  Patient: Louis Karimi (28 y.o. female)  Date: 11/16/2019  Primary Diagnosis: Bilateral leg edema [R60.0]  Hypervolemia [E87.70]        Precautions: falls         ASSESSMENT  Based on the objective data described below, the patient presents with severely compromised mobility. Pt had previously been diuresed for 100lb but has since stopped lasix due to issues making it to the bathroom. Pt demos resistance to cues, defers activity at times and requires max encouragement to participate. Pt max-total A x2+ for rolling however able to maintain sidelying with mod A x2. Pt participates in some ROM BLE however limited by pain and fluid. Pt states that she has not transferred in 6 weeks but wishes to go to PT in rehab to begin process of tolerating mobility. Current Level of Function Impacting Discharge (mobility/balance): total assist    Functional Outcome Measure: The patient scored 0 on the TUG outcome measure which is indicative of unable to participate. Patient will benefit from skilled therapy intervention to address the above noted impairments.        PLAN :  Recommendations and Planned Interventions: bed mobility training, transfer training, edema management/control and therapeutic activities      Frequency/Duration: Patient will be followed by physical therapy:  3 times a week to address goals. Recommendation for discharge: (in order for the patient to meet his/her long term goals)  To be determined: SNF vs LTC     This discharge recommendation:  A follow-up discussion with the attending provider and/or case management is planned    IF patient discharges home will need the following DME: to be determined (TBD)         SUBJECTIVE:   Patient stated I hope to stand before I go home.     OBJECTIVE DATA SUMMARY:   HISTORY:    Past Medical History:   Diagnosis Date    Arrhythmia     A Fib    Arthritis     Atrial fibrillation ([address]     Cancer (HCC)     left breast cancer    Cardiomegaly     CKD (chronic kidney disease) stage 3, GFR 30-59 ml/min (HCC)     judith    Clostridium difficile infection     Congestive heart failure, unspecified     COPD     Diarrhea     /constipation    Dyspepsia and other specified disorders of function of stomach     Elevated antinuclear antibody (MARYAM) level     Endocrine disease     hypothyroidism    GERD (gastroesophageal reflux disease)     Hearing loss     Heart failure (HCC)     Hypertension     Hypertensive heart disease with heart failure (HCC)     diastolic dysfunction    Joint pain     Leg swelling     Long term (current) use of anticoagulants     Lymphedema     Morbid obesity (HCC)     Obesity, unspecified     Other ill-defined conditions(799.89)     lymph edema    Pleural effusion     s/p VATS    Shortness of breath     Thyroid disease     Unspecified sleep apnea     Does not use machine     Past Surgical History:   Procedure Laterality Date    BREAST SURGERY PROCEDURE UNLISTED  13    LEFT BREAST MASTECTOMY SENTINEL NODE BIOPSY      HX BREAST BIOPSY      left    HX BREAST BIOPSY      fibroids removed right breast    HX  SECTION      times 2    HX KNEE ARTHROSCOPY      left knee     HX MODIFIED RADICAL MASTECTOMY  9/19/2013    BREAST MASTECTOMY MODIFIED RADICAL performed by Marco Duran MD at [facility] MAIN OR    HX OTHER SURGICAL      pleural effusion    HX RHINOPLASTY      HX DOMI AND BSO      HX TONSILLECTOMY         Personal factors and/or comorbidities impacting plan of care:     Home Situation  Home Environment: Private residence  # Steps to Enter: 0  Wheelchair Ramp: Yes  One/Two Story Residence: One story  Living Alone: No  Support Systems: Family member(s)  Patient Expects to be Discharged to[de-identified] Rehabilitation facility  Current DME Used/Available at Home: (poorly cooperative)    EXAMINATION/PRESENTATION/DECISION MAKING:   Critical Behavior:  Neurologic State: Alert  Orientation Level: Oriented X4  Cognition: Appropriate decision making, Appropriate for age attention/concentration, Appropriate safety awareness, Follows commands     Hearing: Auditory  Auditory Impairment: None  Skin:  intact  Edema:  none  Range Of Motion:  AROM: Grossly decreased, non-functional                       Strength:    Strength: Grossly decreased, non-functional                    Tone & Sensation:                                  Coordination:  Coordination: Grossly decreased, non-functional  Vision:      Functional Mobility:  Bed Mobility:  Rolling: Total assistance;Assist x2(assist x2 total to roll, three people or more for cleaning)  Supine to Sit: Total assistance  Sit to Supine:  Total assistance     Transfers:   Unable to tolerate         Functional Measure:  TUG 0 as patient unable to tolerate       Physical Therapy Evaluation Charge Determination   History Examination Presentation Decision-Making   HIGH Complexity :3+ comorbidities / personal factors will impact the outcome/ POC  HIGH Complexity : 4+ Standardized tests and measures addressing body structure, function, activity limitation and / or participation in recreation  MEDIUM Complexity : Evolving with changing characteristics  Other outcome measures TUG HIGH       Based on the above components, the patient evaluation is determined to be of the following complexity level: MEDIUM    Pain Rating:  C/o severe pain throughout    Activity Tolerance:   Poor and requires frequent rest breaks  Please refer to the flowsheet for vital signs taken during this treatment. After treatment patient left in no apparent distress:   Supine in bed, Call bell within reach and Caregiver / family present    COMMUNICATION/EDUCATION:   The patients plan of care was discussed with: Registered Nurse and . Fall prevention education was provided and the patient/caregiver indicated understanding. and Patient/family have participated as able in goal setting and plan of care.     Thank you for this referral.  Tiffanie Maier, PT   Time Calculation: 40 mins

## 2019-11-17 LAB
ANION GAP SERPL CALC-SCNC: 5 MMOL/L (ref 5–15)
ATRIAL RATE: 87 BPM
BUN SERPL-MCNC: 29 MG/DL (ref 6–20)
BUN/CREAT SERPL: 17 (ref 12–20)
CALCIUM SERPL-MCNC: 8.7 MG/DL (ref 8.5–10.1)
CALCULATED R AXIS, ECG10: 180 DEGREES
CALCULATED T AXIS, ECG11: 35 DEGREES
CHLORIDE SERPL-SCNC: 106 MMOL/L (ref 97–108)
CO2 SERPL-SCNC: 29 MMOL/L (ref 21–32)
CREAT SERPL-MCNC: 1.73 MG/DL (ref 0.55–1.02)
DIAGNOSIS, 93000: NORMAL
GLUCOSE SERPL-MCNC: 107 MG/DL (ref 65–100)
INR PPP: 2.5 (ref 0.9–1.1)
POTASSIUM SERPL-SCNC: 4.3 MMOL/L (ref 3.5–5.1)
PROTHROMBIN TIME: 24.4 SEC (ref 9–11.1)
Q-T INTERVAL, ECG07: 372 MS
QRS DURATION, ECG06: 96 MS
QTC CALCULATION (BEZET), ECG08: 469 MS
SODIUM SERPL-SCNC: 140 MMOL/L (ref 136–145)
VENTRICULAR RATE, ECG03: 96 BPM

## 2019-11-17 PROCEDURE — P9047 ALBUMIN (HUMAN), 25%, 50ML: HCPCS | Performed by: HOSPITALIST

## 2019-11-17 PROCEDURE — 36415 COLL VENOUS BLD VENIPUNCTURE: CPT

## 2019-11-17 PROCEDURE — 85610 PROTHROMBIN TIME: CPT

## 2019-11-17 PROCEDURE — 74011250637 HC RX REV CODE- 250/637: Performed by: HOSPITALIST

## 2019-11-17 PROCEDURE — 74011250636 HC RX REV CODE- 250/636: Performed by: HOSPITALIST

## 2019-11-17 PROCEDURE — 77030041076 HC DRSG AG OPTICELL MDII -A

## 2019-11-17 PROCEDURE — 65270000029 HC RM PRIVATE

## 2019-11-17 PROCEDURE — 80048 BASIC METABOLIC PNL TOTAL CA: CPT

## 2019-11-17 RX ADMIN — ALLOPURINOL 100 MG: 100 TABLET ORAL at 09:16

## 2019-11-17 RX ADMIN — SPIRONOLACTONE 25 MG: 25 TABLET ORAL at 09:16

## 2019-11-17 RX ADMIN — CARVEDILOL 12.5 MG: 12.5 TABLET, FILM COATED ORAL at 18:25

## 2019-11-17 RX ADMIN — ACETAMINOPHEN 650 MG: 325 TABLET, FILM COATED ORAL at 22:38

## 2019-11-17 RX ADMIN — PANTOPRAZOLE SODIUM 40 MG: 40 TABLET, DELAYED RELEASE ORAL at 07:43

## 2019-11-17 RX ADMIN — Medication 10 ML: at 18:38

## 2019-11-17 RX ADMIN — GABAPENTIN 400 MG: 400 CAPSULE ORAL at 22:33

## 2019-11-17 RX ADMIN — ALBUMIN (HUMAN) 12.5 G: 0.25 INJECTION, SOLUTION INTRAVENOUS at 06:07

## 2019-11-17 RX ADMIN — FUROSEMIDE 80 MG: 10 INJECTION, SOLUTION INTRAMUSCULAR; INTRAVENOUS at 09:17

## 2019-11-17 RX ADMIN — WARFARIN SODIUM 2.5 MG: 2 TABLET ORAL at 18:24

## 2019-11-17 RX ADMIN — Medication 10 ML: at 22:34

## 2019-11-17 RX ADMIN — FUROSEMIDE 80 MG: 10 INJECTION, SOLUTION INTRAMUSCULAR; INTRAVENOUS at 18:25

## 2019-11-17 RX ADMIN — VENLAFAXINE HYDROCHLORIDE 75 MG: 37.5 CAPSULE, EXTENDED RELEASE ORAL at 07:43

## 2019-11-17 RX ADMIN — LEVOTHYROXINE SODIUM 112 MCG: 112 TABLET ORAL at 06:16

## 2019-11-17 NOTE — ROUTINE PROCESS
Bedside shift change report given to Jeaneth Vance RN (oncoming nurse) by Kole Bruner RN (offgoing nurse). Report included the following information SBAR, Kardex, Intake/Output, MAR and Recent Results.

## 2019-11-17 NOTE — PROGRESS NOTES
Hospitalist Progress Note  Ole Escobar MD  Answering service: [phone] from in house phone        Date of Service:  2019  NAME:  Jenean Aase  :  1940  MRN:  [id number]      Admission Summary:   Jenean Aase is a 67 y.o. female who presents with increasing leg swelling, sob      67 y.o. female with past medical history significant for obesity, lymphedema, elevated antinuclear antibody level, pleural effusion,  HTN, a-fib,  hearing loss, CHF, leg swelling, COPD, gout,  CKD, hypothyroidism, GERD, sleep apnea,morbid obesity, and breast cancer who presents from home via EMS with chief complaint of leg swelling and sob. Patient presents to ED with worsening SOB and lymphedema. Patient states her leg swelling is increasing and now has onset of blisters to both her legs bilaterally. Patient notes she has an old laceration to her right shin with old stiches still in place. Patient states due to her lymphedema she is unable to ambulate without heavy assistance. Patient reports she has stopped taking her Lasix because of her lack of mobility to use the bathroom when she takes Lasix. Patient notes she was previously seen in the hospital for extreme lymphedema, when she weighed over 400 lbs and was brought down to 300 lbs after fluid removal, while stating she is back to over 400 lbs today. Patient also complains of becoming easily SOB when she is stressed or overactive. Patient states her SOB has become increasingly prevalent over the past month. Patient affirms she still takes coumadin. Patient affirms SOB, leg swelling, weakness, and wounds. Patient denies fever and chills. There are no other acute medical concerns at this time. Interval history / Subjective:     F/u Bilateral pedal edema/KENT   Pedal edema persists  Having good urine output.  Don't think documented properly  Assessment & Plan:     SOB/Pedal edema  -sec to Acute on chronic BV diastolic congestive heart failure vs lymphedema:   -fluid overload due to her stopping her home diuretics  -Continue IV diuresis if possible  -I/O  -Daily weight, negative 3lbs since admission  -Echo EF 45%, global hypokinesis  -Appreciate Cardiology    Severe tricuspid valve regurgitation     CKD stage 3  -Monitor    Chronic bilateral lymphedema with wound:   -wound care consult     Chronic afib:   -continue coumadin. Rate controlled   -Appreciate Cardiology    Reported medication non compliance  -Counseled    Morbid obese  -Nutrition input    Cardiac diet    PT/OT SNF    Code status: FULL CODE  DVT prophylaxis: Coumadin  PTA: home  Baseline: bed bound    Plan: CM working for SNF, Follow Cardiology    Care Plan discussed with: Patient/Family  Disposition: TBD     Hospital Problems  Date Reviewed: 11/16/2019          Codes Class Noted POA    * (Principal) Hypervolemia ICD-10-CM: E87.70  ICD-9-CM: 276.69  11/15/2019 Unknown        Bilateral leg edema ICD-10-CM: R60.0  ICD-9-CM: 782.3  11/15/2019 Unknown        Acute on chronic diastolic heart failure ([facility] ICD-10-CM: I50.33  ICD-9-CM: 428.33  10/6/2017 Yes                Review of Systems:   A comprehensive review of systems was negative except for that written in the HPI. Vital Signs:    Last 24hrs VS reviewed since prior progress note. Most recent are:  Visit Vitals  /80 (BP 1 Location: Right arm, BP Patient Position: At rest)   Pulse 99   Temp 97.2 °F (36.2 °C)   Resp 14   Ht 5' 9\" (1.753 m)   Wt (!) 180.8 kg (398 lb 11.2 oz)   SpO2 94%   Breastfeeding? No   BMI 58.88 kg/m²         Intake/Output Summary (Last 24 hours) at 11/17/2019 1653  Last data filed at 11/17/2019 0739  Gross per 24 hour   Intake 380 ml   Output 50 ml   Net 330 ml        Physical Examination:             Constitutional:  No acute distress, cooperative, pleasant    ENT:  Oral mucous moist, oropharynx benign. Resp:  CTA bilaterally. No wheezing/rhonchi/rales.  No accessory muscle use   CV:  Regular rhythm, normal rate, no murmurs, gallops, rubs    GI:  Soft, non distended, non tender. normoactive bowel sounds, no hepatosplenomegaly     Musculoskeletal:  Bilateral pedal edema 3+, warm, 2+ pulses throughout    Neurologic:  Moves all extremities. AAOx3, CN II-XII reviewed     Skin:  Good turgor, no rashes or ulcers       Data Review:    Review and/or order of clinical lab test      Labs:     Recent Labs     11/16/19 0256 11/15/19  1418   WBC 7.2 6.5   HGB 11.4* 12.0   HCT 37.3 39.9    200     Recent Labs     11/17/19  0124 11/16/19  0256 11/15/19  1418    141 142   K 4.3 4.3 4.8    108 107   CO2 29 30 30   BUN 29* 26* 25*   CREA 1.73* 1.47* 1.43*   * 117* 98   CA 8.7 9.0 9.0   MG  --  1.9  --    PHOS  --  3.1  --      Recent Labs     11/16/19 0256   SGOT 11*   ALT 14   *   TBILI 1.2*   TP 6.5   ALB 3.4*   GLOB 3.1     Recent Labs     11/17/19 0124 11/16/19  0256 11/15/19  1418   INR 2.5* 2.6* 2.5*   PTP 24.4* 25.4* 24.3*      No results for input(s): FE, TIBC, PSAT, FERR in the last 72 hours. No results found for: FOL, RBCF   No results for input(s): PH, PCO2, PO2 in the last 72 hours. No results for input(s): CPK, CKNDX, TROIQ in the last 72 hours.     No lab exists for component: CPKMB  No results found for: CHOL, CHOLX, CHLST, CHOLV, HDL, HDLP, LDL, LDLC, DLDLP, TGLX, TRIGL, TRIGP, CHHD, [facility]  Lab Results   Component Value Date/Time    Glucose (POC) 98 10/07/2017 11:07 AM    Glucose (POC) 92 10/07/2017 06:50 AM    Glucose (POC) 169 (H) 12/05/2016 04:43 PM    Glucose (POC) 126 (H) 12/05/2016 12:15 PM    Glucose (POC) 112 (H) 12/05/2016 08:23 AM     Lab Results   Component Value Date/Time    Color DARK YELLOW 12/02/2016 05:46 PM    Appearance TURBID (A) 12/02/2016 05:46 PM    Specific gravity 1.022 12/02/2016 05:46 PM    Specific gravity 1.015 03/16/2012 01:55 AM    pH (UA) 5.0 12/02/2016 05:46 PM    Protein 30 (A) 12/02/2016 05:46 PM Glucose NEGATIVE  12/02/2016 05:46 PM    Ketone TRACE (A) 12/02/2016 05:46 PM    Bilirubin NEGATIVE  07/08/2012 12:10 AM    Urobilinogen 1.0 12/02/2016 05:46 PM    Nitrites NEGATIVE  12/02/2016 05:46 PM    Leukocyte Esterase NEGATIVE  12/02/2016 05:46 PM    Epithelial cells MANY (A) 12/02/2016 05:46 PM    Bacteria 1+ (A) 12/02/2016 05:46 PM    WBC 0-4 12/02/2016 05:46 PM    RBC 0-5 12/02/2016 05:46 PM         Medications Reviewed:     Current Facility-Administered Medications   Medication Dose Route Frequency    warfarin (COUMADIN) tablet 2.5 mg  2.5 mg Oral ONCE    influenza vaccine 2019-20 (6 mos+)(PF) (FLUARIX/FLULAVAL/FLUZONE QUAD) injection 0.5 mL  0.5 mL IntraMUSCular PRIOR TO DISCHARGE    levothyroxine (SYNTHROID) tablet 112 mcg  112 mcg Oral 6am    [Held by provider] metFORMIN (GLUCOPHAGE) tablet 500 mg  500 mg Oral DAILY WITH DINNER    sodium chloride (OCEAN) 0.65 % nasal squeeze bottle 2 Spray  2 Spray Both Nostrils Q2H PRN    allopurinol (ZYLOPRIM) tablet 100 mg  100 mg Oral DAILY    carvedilol (COREG) tablet 12.5 mg  12.5 mg Oral BID WITH MEALS    docusate sodium (COLACE) capsule 100 mg  100 mg Oral BID PRN    furosemide (LASIX) injection 80 mg  80 mg IntraVENous BID    gabapentin (NEURONTIN) capsule 400 mg  400 mg Oral QHS    pantoprazole (PROTONIX) tablet 40 mg  40 mg Oral ACB    miconazole (MICOTIN) 2 % powder   Topical BID    spironolactone (ALDACTONE) tablet 25 mg  25 mg Oral DAILY    sodium chloride (NS) flush 5-40 mL  5-40 mL IntraVENous Q8H    sodium chloride (NS) flush 5-40 mL  5-40 mL IntraVENous PRN    naloxone (NARCAN) injection 0.4 mg  0.4 mg IntraVENous PRN    zolpidem (AMBIEN) tablet 5 mg  5 mg Oral QHS PRN    acetaminophen (TYLENOL) tablet 650 mg  650 mg Oral Q4H PRN    ondansetron (ZOFRAN) injection 4 mg  4 mg IntraVENous Q4H PRN    venlafaxine-SR (EFFEXOR-XR) capsule 75 mg  75 mg Oral DAILY WITH BREAKFAST    Warfarin - pharmacy to dose   Other Rx Dosing/Monitoring ______________________________________________________________________  EXPECTED LENGTH OF STAY: - - -  ACTUAL LENGTH OF STAY:          2                 Conan Scheuermann, MD

## 2019-11-17 NOTE — PROGRESS NOTES
Cardiology Consultation Note     Subjective:      Praveen Franks is a 67 y.o. patient who is seen for management of fluid overload, congestive heart failure. The patient have a history of atrial fibrillation. She had seen Dr. Eliza Shankar 2 years ago. There were records of cardiologist Dr Kaiser/Dr Veto Li from the [place] [facility] after she saw Dr Eliza Shankar. Now she want to transfer back to Dr. Eliza Shankar. The patient had a previous history of left ventricular ejection fraction 45% on 2D echocardiogram October 2017. There was report of moderate mitral valve regurgitation and aortic valve regurgitation but mild tricuspid regurgitation. 2D echocardiogram shows severe tricuspid valve regurgitation. LVEF 45%, mild to moderate MR, moderate RV dysfunction  She has chronic lower extremity edema that was considered to be lymphedema in the past and chronic kidney disease. She did not have DVT in September venous duplex.   Her symptom is mainly fatigue and orthopnea   She had low bp with lasix IV and has not been able to urinate much  It is hard for nurse to keep track of her urine output since she is morbidly obese and not able to get in and out of bed  She said her  helps her at home    Patient Active Problem List   Diagnosis Code    Other dyspnea and respiratory abnormality R06.09, R09.89    Other lymphedema I89.0    Morbid obesity with BMI of 50.0-59.9, adult ([facility] E66.01, Z68.43    Lymphedema I89.0    Atrial fibrillation (HCC) I48.91    Hypertensive heart disease with heart failure (HCC) I11.0    Pleural effusion J90    Elevated antinuclear antibody (MARYAM) level R76.8    Acute on chronic renal failure (HCC) N17.9, N18.9    Unspecified hypothyroidism E03.9    Chronic airway obstruction, not elsewhere classified J44.9    Anemia D64.9    ARF (acute renal failure) (HCC) N17.9    Diarrhea R19.7    Complete heart block (HCC) I44.2    Sepsis(995.91) A41.9    Intestinal infection due to Clostridium difficile X35.35    Diastolic CHF, acute on chronic (HCC) I50.33    Hypokalemia E87.6    CKD (chronic kidney disease) stage 3, GFR 30-59 ml/min (HCC) N18.3    Recurrent epistaxis B72.6    Diastolic CHF, chronic (HCC) I50.32    Atrial fibrillation (HCC) I48.91    CKD (chronic kidney disease) stage 3, GFR 30-59 ml/min (HCC) N18.3    Hypovolemia E86.1    Hypokalemia E87.6    History of complete heart block Z86.79    History of Clostridium difficile infection Z86.19    Breast CA (HCC) C50.919    Physical deconditioning R53.81    A-fib (HCC) I48.91    Acute on chronic diastolic heart failure (HCC) I50.33    Hypervolemia E87.70    Bilateral leg edema R60.0     Current Facility-Administered Medications   Medication Dose Route Frequency Provider Last Rate Last Dose    warfarin (COUMADIN) tablet 2.5 mg  2.5 mg Oral Trey Wen MD        influenza vaccine 2019-20 (6 mos+)(PF) (FLUARIX/FLULAVAL/FLUZONE QUAD) injection 0.5 mL  0.5 mL IntraMUSCular PRIOR TO DISCHARGE Ashlie Durbin MD        levothyroxine (SYNTHROID) tablet 112 mcg  112 mcg Oral Rosenthal Prema Guy MD   112 mcg at 11/17/19 0616    [Held by provider] metFORMIN (GLUCOPHAGE) tablet 500 mg  500 mg Oral DAILY WITH Shimon Chaney MD   500 mg at 11/16/19 1807    sodium chloride (OCEAN) 0.65 % nasal squeeze bottle 2 Spray  2 Spray Both Nostrils Q2H PRN Ashlie Durbin MD   2 Spray at 11/16/19 2215    allopurinol (ZYLOPRIM) tablet 100 mg  100 mg Oral DAILY Harjit Mondragon MD   100 mg at 11/17/19 9580    carvedilol (COREG) tablet 12.5 mg  12.5 mg Oral BID WITH MEALS Harjit Mondragon MD   Stopped at 11/16/19 1700    docusate sodium (COLACE) capsule 100 mg  100 mg Oral BID PRN Harjit Mondragon MD        furosemide (LASIX) injection 80 mg  80 mg IntraVENous BID Elaina Garcia MD   80 mg at 11/17/19 1476    gabapentin (NEURONTIN) capsule 400 mg  400 mg Oral QHS Harjit Mondragon MD   400 mg at 11/16/19 2112    pantoprazole (PROTONIX) tablet 40 mg  40 mg Oral ACB Iven Barthel, MD   40 mg at 11/17/19 0743    miconazole (MICOTIN) 2 % powder   Topical BID Iven Barthel, MD        spironolactone (ALDACTONE) tablet 25 mg  25 mg Oral DAILY Iven Barthel, MD   25 mg at 11/17/19 2069    sodium chloride (NS) flush 5-40 mL  5-40 mL IntraVENous Q8H Iven Barthel, MD   10 mL at 11/16/19 2112    sodium chloride (NS) flush 5-40 mL  5-40 mL IntraVENous PRN Iven Barthel, MD   10 mL at 11/16/19 1819    naloxone [facility]) injection 0.4 mg  0.4 mg IntraVENous PRN Iven Barthel, MD        zolpidem [facility]) tablet 5 mg  5 mg Oral QHS PRN Iven Barthel, MD        acetaminophen (TYLENOL) tablet 650 mg  650 mg Oral Q4H PRN Iven Barthel, MD   650 mg at 11/16/19 2325    ondansetron [facility]) injection 4 mg  4 mg IntraVENous Q4H PRN Iven Barthel, MD        venlafaxine[facility] capsule 75 mg  75 mg Oral DAILY WITH Bekah Oliver MD   75 mg at 11/17/19 1596    Warfarin - pharmacy to dose   Other Rx Dosing/Monitoring Iven Barthel, MD         Allergies   Allergen Reactions    Latex Rash    Grass Pollen-[place], Standard Unknown (comments)    Sulfa (Sulfonamide Antibiotics) Nausea and Vomiting     Past Medical History:   Diagnosis Date    Arrhythmia     A Fib    Arthritis     Atrial fibrillation ([facility]     Cancer ([facility]     left breast cancer    Cardiomegaly     CKD (chronic kidney disease) stage 3, GFR 30-59 ml/min (HCC)     judith    Clostridium difficile infection     Congestive heart failure, unspecified     COPD     Diarrhea     /constipation    Dyspepsia and other specified disorders of function of stomach     Elevated antinuclear antibody (MARYAM) level     Endocrine disease     hypothyroidism    GERD (gastroesophageal reflux disease)     Hearing loss     Heart failure ([address]     Hypertension     Hypertensive heart disease with heart failure (HCC)     diastolic dysfunction    Joint pain     Leg swelling     Long term (current) use of anticoagulants     Lymphedema     Morbid obesity ([address]     Obesity, unspecified     Other ill-defined conditions(799.89)     lymph edema    Pleural effusion     s/p VATS    Shortness of breath     Thyroid disease     Unspecified sleep apnea     Does not use machine     Past Surgical History:   Procedure Laterality Date    BREAST SURGERY PROCEDURE UNLISTED  13    LEFT BREAST MASTECTOMY SENTINEL NODE BIOPSY      HX BREAST BIOPSY      left    HX BREAST BIOPSY      fibroids removed right breast    HX  SECTION      times 2    HX KNEE ARTHROSCOPY      left knee     HX MODIFIED RADICAL MASTECTOMY  2013    BREAST MASTECTOMY MODIFIED RADICAL performed by Sandi Benito MD at [facility] MAIN OR    HX OTHER SURGICAL      pleural effusion    HX RHINOPLASTY      HX DOMI AND BSO      HX TONSILLECTOMY       Family History   Problem Relation Age of Onset    Lung Disease Mother         Emphysema    Heart Disease Mother     Hypertension Mother     Cancer Father         Lung Cancer    Heart Disease Father     Hypertension Father     Cancer Paternal Aunt         colon     Social History     Tobacco Use    Smoking status: Never Smoker    Smokeless tobacco: Never Used   Substance Use Topics    Alcohol use: No        Review of Systems:   Constitutional: Negative for fever, chills, weight loss, + malaise/fatigue. HEENT: Negative for nosebleeds, vision changes. Respiratory: Negative for cough, hemoptysis  Cardiovascular: Negative for chest pain, palpitations, + orthopnea, leg swelling, no syncope, and PND. Gastrointestinal: Negative for nausea, vomiting, diarrhea, blood in stool and melena. Genitourinary: Negative for dysuria, and hematuria. Musculoskeletal: Negative for myalgias, + leg arthralgia. Skin: + drainage  Heme: bruise easily.  On coumadin  Neurological: Negative for speech change and focal weakness     Objective:     Visit Vitals  /65 (BP 1 Location: Right arm)   Pulse (!) 104   Temp 98.1 °F (36.7 °C)   Resp 18   Ht 5' 9\" (1.753 m)   Wt (!) 398 lb 11.2 oz (180.8 kg)   SpO2 93%   Breastfeeding? No   BMI 58.88 kg/m²      Physical Exam:   Constitutional: well-developed and well-nourished. No respiratory distress. Head: Normocephalic and atraumatic. Eyes: Pupils are equal, round  ENT: hearing normal  Neck: supple. No JVD present. Cardiovascular irregular rhythm. 2/6 systolic RLSB murmur heard. Pulmonary/Chest: Effort normal No wheezes. Abdominal: morbidly obese. Distended anasarca to the hips and back  Musculoskeletal: 4+ edema. Neurological: alert,oriented. Skin: warm with bruises  Psychiatric: normal mood and affect. Behavior is normal. Judgment and thought content normal.      EKG: atrial fibrillation. There is low voltage QRS complexes and poor R wave progression atypical incomplete rbbb or IVCD      Assessment/Plan:   The patient has severe tricuspid valve regurgitation, severe leg edema/anasarca. Atrial fibrillation  Morbid obesity  Hx breast cancer  CKD stage 3    Currently she is getting Lasix 80 mg IV twice daily and also spironolactone but need albumin IV to maintain BP. She does not have good urine output documentation so will have to follow her weight. She has been down 3 lbs since yesterday  She is not likely a candidate for open heart surgery given her weight. Continue with coumadin for stroke prevention   She may need ultrafiltration but there are risks, she does not want it nor garza catheter due to risk of infection  Dr. Willem Dennis will assume care on Monday. Thank you for involving me in this patient's care and please call with further concerns or questions. Jane Garcia M.D.   Electrophysiology/Cardiology  [facility] [place]  [address] [place] [address]                             [phone]

## 2019-11-17 NOTE — PROGRESS NOTES
Orders received, chart reviewed. Upon entering room, nursing at bedside and nurse/pt asking for OT to return later as patient attempting to get comfortable and urinate using purewick. Just repositioned purewick and requiring total A for this.  Will follow up later as able and appropriate for OT eval.     Hillary Ibarra, OTR/L

## 2019-11-17 NOTE — PROGRESS NOTES
Spiritual Care Assessment/Progress Note  [facility]      NAME: Melissa Interiano      MRN: [id number]  AGE: 67 y.o.  SEX: female  Buddhist Affiliation: Mormon   Language: English     11/17/2019     Total Time (in minutes): 35     Spiritual Assessment begun in [place] [address] through conversation with:         []Patient        [] Family    [] Friend(s)        Reason for Consult: Advance medical directive consult     Spiritual beliefs: (Please include comment if needed)     [x] Identifies with a moustapha tradition:         [] Supported by a moustapha community:            [] Claims no spiritual orientation:           [] Seeking spiritual identity:                [] Adheres to an individual form of spirituality:           [] Not able to assess:                           Identified resources for coping:      [x] Prayer                               [] Music                  [] Guided Imagery     [x] Family/friends                 [] Pet visits     [] Devotional reading                         [] Unknown     [] Other:                                              Interventions offered during this visit: (See comments for more details)    Patient Interventions: Advance medical directive consult, Affirmation of emotions/emotional suffering, Affirmation of moustapha, Coping skills reviewed/reinforced, Iconic (affirming the presence of God/Higher Power), Normalization of emotional/spiritual concerns           Plan of Care:     [] Support spiritual and/or cultural needs    [x] Support AMD and/or advance care planning process      [] Support grieving process   [] Coordinate Rites and/or Rituals    [] Coordination with community clergy   [] No spiritual needs identified at this time   [] Detailed Plan of Care below (See Comments)  [] Make referral to Music Therapy  [] Make referral to Pet Therapy     [] Make referral to Addiction services  [] Make referral to [facility]  [] Make referral to Spiritual Care Partner  [] No future visits requested        [x] Follow up visits as needed     Visited pt in response to an In-Basket request to assist with an Advance Medical Directive (AMD). Explained document to patient, who chose not to complete an AMD at this time.  She will first have a conversation with her  concerning the AMD. She was encouraged to have a  paged when she is ready to complete an AMD.   Chaplain Damian MDiv, MS, [address] ([phone])

## 2019-11-17 NOTE — PROGRESS NOTES
A Spiritual Care Partner Volunteer visited patient in [place] [address] on 11/17/2019.   Documented by:  Chaplain Rutledge MDiv, MS, [address] [place] [address] ([id number])

## 2019-11-17 NOTE — ACP (ADVANCE CARE PLANNING)
Visited pt in response to an In-Basket request to assist with an Advance Medical Directive (AMD). Explained document to patient, who chose not to complete an AMD at this time.  She will first have a conversation with her  concerning the AMD. She was encouraged to have a  paged when she is ready to complete an AMD.   Chaplain Ana Lilia, MDiv, MS, [facility]  [phone] ([phone])

## 2019-11-17 NOTE — PROGRESS NOTES
Pharmacist Note - Warfarin Dosing  Consult provided for this 67 y. o.female to manage warfarin for Atrial Fibrillation    INR Goal: 2 - 3    Home regimen/ tablet size: 2.5 mg daily, except 5 mg on Mondays    Drugs that may increase INR: None  Drugs that may decrease INR: None  Other current anticoagulants/ drugs that may increase bleeding risk: allopurinol  Risk factors: Age > 65  Daily INR ordered: YES    Recent Labs     11/17/19  0124 11/16/19  0256 11/15/19  1418   HGB  --  11.4* 12.0   INR 2.5* 2.6* 2.5*     Date               INR                  Dose  11/15  2.5  2.5   11/16  2.6  2.5   11/17  2.5  2.5                                                                                Assessment/ Plan: Will order warfarin 2.5 mg PO x 1 dose. Pharmacy will continue to monitor daily and adjust therapy as indicated. Please contact the pharmacist at   for outpatient recommendations if needed.      Trevor Alves, PharmD, BCPP, [facility] Specialist, [facility]

## 2019-11-17 NOTE — PROGRESS NOTES
Bedside shift change report given to Gracia Alves RN (oncoming nurse) by Luma Acharya RN (offgoing nurse). Report included the following information SBAR, Kardex, Intake/Output, MAR and Recent Results.

## 2019-11-18 LAB
ANION GAP SERPL CALC-SCNC: 6 MMOL/L (ref 5–15)
BUN SERPL-MCNC: 30 MG/DL (ref 6–20)
BUN/CREAT SERPL: 16 (ref 12–20)
CALCIUM SERPL-MCNC: 8.9 MG/DL (ref 8.5–10.1)
CHLORIDE SERPL-SCNC: 103 MMOL/L (ref 97–108)
CO2 SERPL-SCNC: 31 MMOL/L (ref 21–32)
CREAT SERPL-MCNC: 1.91 MG/DL (ref 0.55–1.02)
GLUCOSE SERPL-MCNC: 107 MG/DL (ref 65–100)
INR PPP: 2 (ref 0.9–1.1)
POTASSIUM SERPL-SCNC: 3.8 MMOL/L (ref 3.5–5.1)
PROTHROMBIN TIME: 19.4 SEC (ref 9–11.1)
SODIUM SERPL-SCNC: 140 MMOL/L (ref 136–145)

## 2019-11-18 PROCEDURE — 97535 SELF CARE MNGMENT TRAINING: CPT

## 2019-11-18 PROCEDURE — 85610 PROTHROMBIN TIME: CPT

## 2019-11-18 PROCEDURE — 74011250637 HC RX REV CODE- 250/637: Performed by: HOSPITALIST

## 2019-11-18 PROCEDURE — 77030041071 HC DRSG COLLGN MDII -A

## 2019-11-18 PROCEDURE — 80048 BASIC METABOLIC PNL TOTAL CA: CPT

## 2019-11-18 PROCEDURE — 36415 COLL VENOUS BLD VENIPUNCTURE: CPT

## 2019-11-18 PROCEDURE — 97165 OT EVAL LOW COMPLEX 30 MIN: CPT

## 2019-11-18 PROCEDURE — 65270000029 HC RM PRIVATE

## 2019-11-18 PROCEDURE — 97530 THERAPEUTIC ACTIVITIES: CPT

## 2019-11-18 PROCEDURE — 77030038269 HC DRN EXT URIN PURWCK BARD -A

## 2019-11-18 RX ORDER — BALSAM PERU/CASTOR OIL
OINTMENT (GRAM) TOPICAL 2 TIMES DAILY
Status: DISCONTINUED | OUTPATIENT
Start: 2019-11-18 | End: 2019-11-20 | Stop reason: HOSPADM

## 2019-11-18 RX ORDER — IPRATROPIUM BROMIDE AND ALBUTEROL SULFATE 2.5; .5 MG/3ML; MG/3ML
3 SOLUTION RESPIRATORY (INHALATION)
Status: DISCONTINUED | OUTPATIENT
Start: 2019-11-18 | End: 2019-11-20 | Stop reason: HOSPADM

## 2019-11-18 RX ORDER — WARFARIN SODIUM 5 MG/1
5 TABLET ORAL ONCE
Status: COMPLETED | OUTPATIENT
Start: 2019-11-18 | End: 2019-11-18

## 2019-11-18 RX ADMIN — WARFARIN SODIUM 5 MG: 5 TABLET ORAL at 17:24

## 2019-11-18 RX ADMIN — MICONAZOLE NITRATE 2 % TOPICAL POWDER: at 17:24

## 2019-11-18 RX ADMIN — VENLAFAXINE HYDROCHLORIDE 75 MG: 37.5 CAPSULE, EXTENDED RELEASE ORAL at 07:26

## 2019-11-18 RX ADMIN — LEVOTHYROXINE SODIUM 112 MCG: 112 TABLET ORAL at 06:04

## 2019-11-18 RX ADMIN — SPIRONOLACTONE 25 MG: 25 TABLET ORAL at 09:29

## 2019-11-18 RX ADMIN — ALLOPURINOL 100 MG: 100 TABLET ORAL at 09:29

## 2019-11-18 RX ADMIN — GABAPENTIN 400 MG: 400 CAPSULE ORAL at 21:46

## 2019-11-18 RX ADMIN — Medication 10 ML: at 21:46

## 2019-11-18 RX ADMIN — MICONAZOLE NITRATE 2 % TOPICAL POWDER: at 09:33

## 2019-11-18 RX ADMIN — Medication 10 ML: at 06:04

## 2019-11-18 RX ADMIN — CARVEDILOL 12.5 MG: 12.5 TABLET, FILM COATED ORAL at 17:24

## 2019-11-18 RX ADMIN — PANTOPRAZOLE SODIUM 40 MG: 40 TABLET, DELAYED RELEASE ORAL at 07:27

## 2019-11-18 RX ADMIN — CARVEDILOL 12.5 MG: 12.5 TABLET, FILM COATED ORAL at 07:29

## 2019-11-18 NOTE — PROGRESS NOTES
Problem: Self Care Deficits Care Plan (Adult)  Goal: *Acute Goals and Plan of Care (Insert Text)  Description    FUNCTIONAL STATUS PRIOR TO ADMISSION: Patient was mostly bed bound for last few months - requiring assist for bathing, dressing and toileting. Patient sleeping in recliner and  assisting with [facility] transfers (has been unable to get to [facility] for last few weeks). HOME SUPPORT: The patient lived with  and required significant assistance. Per patient,  does not allow her to try things herself in order to maintain IND. Occupational Therapy Goals  Initiated 11/18/2019  1. Patient will perform all grooming tasks sitting EOB with supervision/set-up within 7 day(s). 2.  Patient will perform anterior neck to thigh bathing sitting EOB with minimal assistance/contact guard assist within 7 day(s). 3.  Patient will perform upper body dressing with minimal assistance/contact guard assist within 7 day(s). 4.  Patient will perform toilet transfers rolling L/R in bed in prep for bedpan placement with moderate assistance within 7 day(s). 5.  Patient will participate in upper extremity therapeutic exercise/activities with supervision/set-up for 5 minutes within 7 day(s). 6.  Patient will utilize energy conservation techniques during functional activities with verbal cues within 7 day(s). Outcome: Progressing Towards Goal    OCCUPATIONAL THERAPY EVALUATION  Patient: Emerson Coffman (18 y.o. female)  Date: 11/18/2019  Primary Diagnosis: Bilateral leg edema [R60.0]  Hypervolemia [E87.70]        Precautions:  Fall    ASSESSMENT  Based on the objective data described below, the patient presents with limited ADL performance 2* impaired balance, generalized weakness, KENT, decreased functional activity tolerance, severe BLE edema/lymphedema, body habitus and limited lower body access s/p admission for bilateral leg edema and hypovolemia.  At baseline, patient reports living with his  and requiring assist for all bathing, dressing and toileting (able to self feed and complete grooming). Patient reports she has had a more steady decline the last few weeks but was able to transfer to / as few as 2 months ago. Today, with encouragement, patient able to come to sitting EOB with minimal A x2 and additional time/rest breaks. Patient able to complete oral care sitting EOB with supervision. Patient required max Ax2 to return to supine but was able to use BUE to pull herself to [facility] while bed in trendelenburg. Patient left semisupine in bed with call bell in reach, RN aware and wound care bedside. Will continue to follow, recommend SNF level rehab once medically stable to maximize IND and function as well as allow for family safety training and decreased caregiver burden. Current Level of Function Impacting Discharge (ADLs/self-care): up to total A for ADLs    Functional Outcome Measure: The patient scored 15/100 on the Barthel Index outcome measure which is indicative of severe deficits in ADLs. Other factors to consider for discharge: lives with elderly , limited social support, requiring 2 assist for ADLs/mobility at this time     Patient will benefit from skilled therapy intervention to address the above noted impairments. PLAN :  Recommendations and Planned Interventions: self care training, functional mobility training, therapeutic exercise, balance training, therapeutic activities, endurance activities, patient education, home safety training and family training/education    Frequency/Duration: Patient will be followed by occupational therapy 3 times a week to address goals.     Recommendation for discharge: (in order for the patient to meet his/her long term goals)  Therapy up to 5 days/week in SNF setting    This discharge recommendation:  Has been made in collaboration with the attending provider and/or case management    IF patient discharges home will need the following DME: to be determined (TBD)       SUBJECTIVE:   Patient stated I just get so tired so easy.     OBJECTIVE DATA SUMMARY:   HISTORY:   Past Medical History:   Diagnosis Date    Arrhythmia     A Fib    Arthritis     Atrial fibrillation ([address]     Cancer (HCC)     left breast cancer    Cardiomegaly     CKD (chronic kidney disease) stage 3, GFR 30-59 ml/min (HCC)     judith    Clostridium difficile infection     Congestive heart failure, unspecified     COPD     Diarrhea     /constipation    Dyspepsia and other specified disorders of function of stomach     Elevated antinuclear antibody (MARYAM) level     Endocrine disease     hypothyroidism    GERD (gastroesophageal reflux disease)     Hearing loss     Heart failure (HCC)     Hypertension     Hypertensive heart disease with heart failure (HCC)     diastolic dysfunction    Joint pain     Leg swelling     Long term (current) use of anticoagulants     Lymphedema     Morbid obesity (HCC)     Obesity, unspecified     Other ill-defined conditions(799.89)     lymph edema    Pleural effusion     s/p VATS    Shortness of breath     Thyroid disease     Unspecified sleep apnea     Does not use machine     Past Surgical History:   Procedure Laterality Date    BREAST SURGERY PROCEDURE UNLISTED  13    LEFT BREAST MASTECTOMY SENTINEL NODE BIOPSY      HX BREAST BIOPSY      left    HX BREAST BIOPSY      fibroids removed right breast    HX  SECTION      times 2    HX KNEE ARTHROSCOPY      left knee     HX MODIFIED RADICAL MASTECTOMY  2013    BREAST MASTECTOMY MODIFIED RADICAL performed by Marco Duran [facility] MA[facility] OR    HX OTHER SURGICAL      pleural effusion    HX RHINOPLASTY      HX DOMI AND BSO      HX TONSILLECTOMY         Expanded or extensive additional review of patient history:     Home Situation  Home Environment: Private residence  # Steps to Enter: 0  Wheelchair Ramp: Yes  One/Two Story Residence: One story  Living Alone: No  Support Systems: Spouse/Significant Other/Partner, Family member(s)  Patient Expects to be Discharged to[de-identified] Rehabilitation facility  Current DME Used/Available at Home: Commode, bedside, Wheelchair  Tub or Shower Type: Other (comment)(bed bathing at baseline)    Hand dominance: Right    EXAMINATION OF PERFORMANCE DEFICITS:  Cognitive/Behavioral Status:  Neurologic State: Alert  Orientation Level: Oriented X4  Cognition: Appropriate for age attention/concentration; Follows commands; Impaired decision making  Perception: Appears intact  Perseveration: No perseveration noted  Safety/Judgement: Awareness of environment; Fall prevention    Skin: noted laceration on R shin (wound care aware - dressing slid off during mobility), mottled skin from knees down 2* edema; bruising on BUE 2* fragile skin    Edema: 4+ in BLE    Hearing: Auditory  Auditory Impairment: Hard of hearing, right side    Vision/Perceptual:    Tracking: Able to track stimulus in all quadrants w/o difficulty    Diplopia: No    Acuity: Within Defined Limits    Corrective Lenses: Reading glasses    Range of Motion:  In BUEs  AROM: Generally decreased, functional    Strength: In BUEs  Strength: Generally decreased, functional    Coordination:  Coordination: Generally decreased, functional  Fine Motor Skills-Upper: Left Intact; Right Intact    Gross Motor Skills-Upper: Left Intact; Right Intact    Tone & Sensation:  In BUEs  Tone: Normal  Sensation: Intact     Balance:  Sitting: Impaired  Sitting - Static: Good (unsupported)  Sitting - Dynamic: Fair (occasional)    Functional Mobility and Transfers for ADLs:  Bed Mobility:  Supine to Sit: Minimum assistance; Additional time;Assist x2  Sit to Supine: Moderate assistance;Assist x2; Additional time; Adaptive equipment  Scooting: Minimum assistance; Additional time;Assist x2    Transfers:   NT this session 2* quick onset fatigue and pain in BLE    ADL Assessment:  Feeding: Setup    Oral Facial Hygiene/Grooming: Setup    Bathing: Maximum assistance    Upper Body Dressing: Moderate assistance    Lower Body Dressing: Total assistance    Toileting: Total assistance    ADL Intervention and task modifications:  Grooming  Brushing Teeth: Set-up  Cues: Verbal cues provided    Toileting  Bladder Hygiene: Total assistance (dependent)(alejo)    Cognitive Retraining  Safety/Judgement: Awareness of environment; Fall prevention     Functional Measure:  Barthel Index:    Bathin  Bladder: 5  Bowels: 5  Groomin  Dressin  Feedin  Mobility: 0  Stairs: 0  Toilet Use: 0  Transfer (Bed to Chair and Back): 0  Total: 15/100        The Barthel ADL Index: Guidelines  1. The index should be used as a record of what a patient does, not as a record of what a patient could do. 2. The main aim is to establish degree of independence from any help, physical or verbal, however minor and for whatever reason. 3. The need for supervision renders the patient not independent. 4. A patient's performance should be established using the best available evidence. Asking the patient, friends/relatives and nurses are the usual sources, but direct observation and common sense are also important. However direct testing is not needed. 5. Usually the patient's performance over the preceding 24-48 hours is important, but occasionally longer periods will be relevant. 6. Middle categories imply that the patient supplies over 50 per cent of the effort. 7. Use of aids to be independent is allowed. Sonia Weber, Barthel, D.W. (1808). Functional evaluation: the Barthel Index. [facility] (14)2. KARLA Ballard, Mayela Escalante., Dannielle Jolly.[facility], [address] [place] [address] (). Measuring the change indisability after inpatient rehabilitation; comparison of the responsiveness of the Barthel Index and Functional Dale Measure. Journal of Neurology, Neurosurgery, and Psychiatry, 66(4), 782-774.   Gloria Madrigal, N.J.A, EFRAÍN Reddy, Fanny Colmenares MSUSSY. (2004.) Assessment of post-stroke quality of life in cost-effectiveness studies: The usefulness of the Barthel Index and the EuroQoL-5D. Quality of Life Research, 15, 203-90       Occupational Therapy Evaluation Charge Determination   History Examination Decision-Making   LOW Complexity : Brief history review  MEDIUM Complexity : 3-5 performance deficits relating to physical, cognitive , or psychosocial skils that result in activity limitations and / or participation restrictions HIGH Complexity : Patient presents with comorbidities that affect occupational performance. Signifigant modification of tasks or assistance (eg, physical or verbal) with assessment (s) is necessary to enable patient to complete evaluation       Based on the above components, the patient evaluation is determined to be of the following complexity level: MEDIUM  Pain Rating:  Reporting pain with movement in BLE, however, did not quantify    Activity Tolerance:   Fair, SpO2 stable on RA, requires frequent rest breaks and observed SOB with activity  Please refer to the flowsheet for vital signs taken during this treatment. After treatment patient left in no apparent distress:    Supine in bed, Heels elevated for pressure relief, Call bell within reach, Caregiver / family present, Side rails x 3 and RN aware     COMMUNICATION/EDUCATION:   The patients plan of care was discussed with: Physical Therapist, Registered Nurse and Physician. Home safety education was provided and the patient/caregiver indicated understanding. and Patient/family have participated as able in goal setting and plan of care. This patients plan of care is appropriate for delegation to [facility].     Thank you for this referral.  Destinee Woodson OT  Time Calculation: 49 mins

## 2019-11-18 NOTE — PROGRESS NOTES
Renal-note pt's admission to hospital. She has cancelled / no showed to the last 5 office appointments she has had with me. She is essentially housebound. Appreciate Dr. Pema Blake seeing pt.   Boris Patel MD

## 2019-11-18 NOTE — NURSE NAVIGATOR
Chart reviewed by Heart Failure Nurse Navigator. Heart Failure database completed. EF:  45%    ACEi/ARB/ARNi: Not indicated    BB: Coreg    Aldosterone Antagonist: Aldactone    Obstructive Sleep Apnea Screening: Yes/ not currently on CPAP   STOP-BANG score:   Referred to Sleep Medicine:     CRT not indicated    NYHA Functional Class requested via provider message on [employer]      Heart Failure Teach Back in Patient Education. Heart Failure Avoiding Triggers on Discharge Instructions. Cardiologist: [facility]      Post discharge follow up phone call to be made within 48-72 hours of discharge.

## 2019-11-18 NOTE — PROGRESS NOTES
Cardiology Consultation Note     Subjective:      Cherelle Mayo is a 67 y.o. patient who is seen for management of severe TR, diastolic heart failure, atrial fibrillation. She had seen Dr. Serafin Moore 2 years ago and then followed by [facility]/Dr Kaiser/Dr Nino/Dr. Dario Pena after that. During my visit this morning I asked her about her desire to transfer care to Dr. Serafin Moore and she said that she did not intend to leave Dr. Dario Pena. There must have been a misunderstanding with Dr. Marian Lepe yesterday. She says she did not request to transfer care to Dr. Serafin Moore. I informed her that I would ask [facility] to follow up on her today. The patient had a previous history of left ventricular ejection fraction 45% on 2D echocardiogram October 2017. There was report of moderate mitral valve regurgitation and aortic valve regurgitation but mild tricuspid regurgitation. 2D echocardiogram this admission shows severe tricuspid valve regurgitation. LVEF 45%, mild to moderate MR, moderate RV dysfunction  She has chronic lower extremity edema that was considered to be lymphedema in the past and chronic kidney disease. She did not have DVT in September venous duplex. Her symptom is mainly fatigue and her lymphedema. She is not currently going to the lymphedema clinic - she has seen them in the past.  She denies any dyspnea or chest pain. No palpitations.       Patient Active Problem List   Diagnosis Code    Other dyspnea and respiratory abnormality R06.09, R09.89    Other lymphedema I89.0    Morbid obesity with BMI of 50.0-59.9, adult ([facility]) E66.01, Z68.43    Lymphedema I89.0    Atrial fibrillation ([facility]) I48.91    Hypertensive heart disease with heart failure ([facility]) I11.0    Pleural effusion J90    Elevated antinuclear antibody (MARYAM) level R76.8    Acute on chronic renal failure ([facility]) N17.9, N18.9    Unspecified hypothyroidism E03.9    Chronic airway obstruction, not elsewhere classified J44.9    Anemia D64.9    ARF (acute renal failure) (HCC) N17.9    Diarrhea R19.7    Complete heart block (HCC) I44.2    Sepsis(995.91) A41.9    Intestinal infection due to Clostridium difficile M49.11    Diastolic CHF, acute on chronic (HCC) I50.33    Hypokalemia E87.6    CKD (chronic kidney disease) stage 3, GFR 30-59 ml/min (HCC) N18.3    Recurrent epistaxis A49.9    Diastolic CHF, chronic (HCC) I50.32    Atrial fibrillation (HCC) I48.91    CKD (chronic kidney disease) stage 3, GFR 30-59 ml/min (HCC) N18.3    Hypovolemia E86.1    Hypokalemia E87.6    History of complete heart block Z86.79    History of Clostridium difficile infection Z86.19    Breast CA ([facility]) C50.919    Physical deconditioning R53.81    A-fib ([facility]) I48.91    Acute on chronic diastolic heart failure ([facility]) I50.33    Hypervolemia E87.70    Bilateral leg edema R60.0     Current Facility-Administered Medications   Medication Dose Route Frequency Provider Last Rate Last Dose    influenza vaccine 2019-20 (6 mos+)(PF) (FLUARIX/FLULAVAL/FLUZONE QUAD) injection 0.5 mL  0.5 mL IntraMUSCular PRIOR TO DISCHARGE Ivan Scott MD        levothyroxine (SYNTHROID) tablet 112 mcg  112 mcg Oral Maroc Fierro MD   112 mcg at 11/18/19 0604    [Held by provider] metFORMIN (GLUCOPHAGE) tablet 500 mg  500 mg Oral DAILY WITH Shimon Chopra MD   500 mg at 11/16/19 1807    sodium chloride (OCEAN) 0.65 % nasal squeeze bottle 2 Spray  2 Spray Both Nostrils Q2H PRN Ivan Scott MD   2 Spray at 11/16/19 2215    allopurinol (ZYLOPRIM) tablet 100 mg  100 mg Oral DAILY Rajni Galicia MD   100 mg at 11/17/19 8135    carvedilol (COREG) tablet 12.5 mg  12.5 mg Oral BID WITH MEALS Rajni Galicia MD   12.5 mg at 11/18/19 3939    docusate sodium (COLACE) capsule 100 mg  100 mg Oral BID PRN Rajni Galicia MD       [facility] [place] by provider] furosemide (LASIX) injection 80 mg  80 mg IntraVENous BID Elaina Garcia MD   80 mg at 11/17/19 1825    gabapentin (NEURONTIN) capsule 400 mg  400 mg Oral QHS Elaina Garcia, MD   400 mg at 11/17/19 2233    pantoprazole (PROTONIX) tablet 40 mg  40 mg Oral ACB Nargis Flanagan MD   40 mg at 11/18/19 2628    miconazole (MICOTIN) 2 % powder   Topical BID Nargis Flanagan MD        spironolactone (ALDACTONE) tablet 25 mg  25 mg Oral DAILY Nargis Flanagan MD   25 mg at 11/17/19 1674    sodium chloride (NS) flush 5-40 mL  5-40 mL IntraVENous Q8H Nargis Flanagan MD   10 mL at 11/18/19 0604    sodium chloride (NS) flush 5-40 mL  5-40 mL IntraVENous PRN Nargis Flanagan MD   10 mL at 11/16/19 1819    naloxone [facility]) injection 0.4 mg  0.4 mg IntraVENous PRN Nargis Flanagan MD        zolpidem [facility]) tablet 5 mg  5 mg Oral QHS PRN Nargis Flanagan MD        acetaminophen (TYLENOL) tablet 650 mg  650 mg Oral Q4H PRN Nargis Flanagan MD   650 mg at 11/17/19 2238    ondansetron (ZOFRAN) injection 4 mg  4 mg IntraVENous Q4H PRN Nargis Flanagan MD        venlafaxine-[facility] capsule 75 mg  75 mg Oral DAILY WITH Anne-Marie Talley MD   75 mg at 11/18/19 3540    Warfarin - pharmacy to dose   Other Rx Dosing/Monitoring Nargis Flanagan MD         Allergies   Allergen Reactions    Latex Rash    Grass Pollen-[place], Standard Unknown (comments)    Sulfa (Sulfonamide Antibiotics) Nausea and Vomiting     Past Medical History:   Diagnosis Date    Arrhythmia     A Fib    Arthritis     [facility]     Cancer ([facility]     left breast cancer    Cardiomegaly     CKD (chronic kidney disease) stage 3, GFR 30-59 ml/min (HCC)     judith    Clostridium difficile infection     Congestive heart failure, unspecified     COPD     Diarrhea     /constipation    Dyspepsia and other specified disorders of function of stomach     Elevated antinuclear antibody (MARYAM) level     Endocrine disease     hypothyroidism    GERD (gastroesophageal reflux disease)     Hearing loss     Heart failure ([address]     Hypertension     Hypertensive heart disease with heart failure (HCC)     diastolic dysfunction    Joint pain     Leg swelling     Long term (current) use of anticoagulants     Lymphedema     Morbid [facility]     Obesity, unspecified     Other ill-defined conditions(799.89)     lymph edema    Pleural effusion     s/p VATS    Shortness of breath     Thyroid disease     Unspecified sleep apnea     Does not use machine     Past Surgical History:   Procedure Laterality Date    BREAST SURGERY PROCEDURE UNLISTED  13    LEFT BREAST MASTECTOMY SENTINEL NODE BIOPSY      HX BREAST BIOPSY      left    HX BREAST BIOPSY      fibroids removed right breast    HX  SECTION      times 2    HX KNEE ARTHROSCOPY      left knee     HX MODIFIED RADICAL MASTECTOMY  2013    BREAST MASTECTOMY MODIFIED RADICAL performed by Kamari Mccabe MD at [facility] MAIN OR    HX OTHER SURGICAL      pleural effusion    HX RHINOPLASTY      HX DOMI AND BSO      HX TONSILLECTOMY       Family History   Problem Relation Age of Onset    Lung Disease Mother         Emphysema    Heart Disease Mother     Hypertension Mother     Cancer Father         Lung Cancer    Heart Disease Father     Hypertension Father     Cancer Paternal Aunt         colon     Social History     Tobacco Use    Smoking status: Never Smoker    Smokeless tobacco: Never Used   Substance Use Topics    Alcohol use: No        Review of Systems:   Constitutional: Negative for fever, chills, weight loss, + malaise/fatigue. HEENT: Negative for nosebleeds, vision changes. Respiratory: Negative for cough, hemoptysis  Cardiovascular: Negative for chest pain, palpitations,  no syncope, and PND. +lymphedema  Gastrointestinal: Negative for nausea, vomiting, diarrhea, blood in stool and melena. Genitourinary: Negative for dysuria, and hematuria. Musculoskeletal: Negative for myalgias  Skin: + drainage  Heme: bruise easily.  On coumadin  Neurological: Negative for speech change and focal weakness     Objective:     Visit Vitals  /66   Pulse (!) 108   Temp 97.9 °F (36.6 °C)   Resp 18   Ht 5' 9\" (1.753 m)   Wt (!) 397 lb 9.6 oz (180.4 kg)   SpO2 93%   Breastfeeding? No   BMI 58.72 kg/m²      Physical Exam:   Constitutional: well-developed and well-nourished. No respiratory distress. Head: Normocephalic and atraumatic. ENT: hearing normal  Neck: supple. No JVD present. Cardiovascular irregular rhythm. 2/6 systolic RLSB murmur heard. Pulmonary/Chest: Effort normal No wheezes. Abdominal: morbidly obese. Distended anasarca to the hips and back  Musculoskeletal: 4+ edema. Neurological: alert,oriented. Skin: warm with bruises  Psychiatric: normal mood and affect. Behavior is normal. Judgment and thought content normal.      Assessment/Plan:     1. Acute on chronic diastolic CHF - hx of pleural effusion s/p VATS in the past  -diuresed with IV lasix but will hold diuretics now for rising creatinine, continue spironolactone  -LVEF 45% by TTE with valve disease (see below)  -will consult [facility] to assume her cardiovascular care  2. Severe TR - holding diuretics today for rising creatinine  3. Afib  - rate controlled on Coreg, [id number] on coumadin, INR 2.0 today, coumadin dosing per pharmacy  4. HTN - well controlled on current regimen  5. CKD stage III - creatinine up to 1.9 today, -3800ml fluid balance in last 24 hours, will hold lasix for now and consult nephrology, followed by Dr. Taryn Sanches as OP    6. SOB - multifactorial with obesity, COPD, Atrial Fib, diastolic CHF, severe TR   7. NSVT -hx of , continue coreg, keep K 4-4.5 and Mg 2-2.5  8. Hx of GIANNA - does not use CPAP  9. Hx of left breast cancer  10. Morbid obesity - Body mass index is 58.72 kg/m².  needs to work on diet, exercise and weight loss    Echo 11/19 - EF 45%, global HK, mod reduced RV systolic function, severely dilated RA, LA ok, AV sclerosis, mild to mod MR, severe TR, RVSP 39  Echo 10/17 - LVEF 45 %, possible moderate hypokinesis of the apical wall(s), mild to mod MR, mod AI, mild TR  Echo 12/16 - LVEF 55 %, no WMA, dilated LA, mild MR, mild TR  Echo 3/12 - LVEF 60 % to 65 %, no WMA, wall thickness was mildly to moderately increased, mild cLVH, RV mildly dilated, wall thickness was mildly increased with systolic pressure moderately increased (RVSP 45mmHG), mod dilated LA, markedly dilated RA, mild TR, mod TR       Rosio Lara NP   [facility]  [address] [place] [address] Avenue                             [phone]

## 2019-11-18 NOTE — PROGRESS NOTES
Problem: Falls - Risk of  Goal: *Absence of Falls  Description  Document [place] Fall Risk and appropriate interventions in the flowsheet. Outcome: Progressing Towards Goal  Note:   Fall Risk Interventions:            Medication Interventions: Patient to call before getting OOB, Evaluate medications/consider consulting pharmacy    Elimination Interventions: Call light in reach, Toileting schedule/hourly rounds              Problem: Patient Education: Go to Patient Education Activity  Goal: Patient/Family Education  Outcome: Progressing Towards Goal     Problem: Pressure Injury - Risk of  Goal: *Prevention of pressure injury  Description  Document Devon Scale and appropriate interventions in the flowsheet. Outcome: Progressing Towards Goal  Note:   Pressure Injury Interventions:  Sensory Interventions: Float heels, Keep linens dry and wrinkle-free, Discuss PT/OT consult with provider, Turn and reposition approx. every two hours (pillows and wedges if needed), Minimize linen layers, Assess need for specialty bed, Check visual cues for pain    Moisture Interventions: Absorbent underpads, Assess need for specialty bed, Check for incontinence Q2 hours and as needed, Internal/External urinary devices, Apply protective barrier, creams and emollients, Limit adult briefs    Activity Interventions: Pressure redistribution bed/mattress(bed type), PT/OT evaluation    Mobility Interventions: Assess need for specialty bed, Float heels, Pressure redistribution bed/mattress (bed type), PT/OT evaluation, Turn and reposition approx.  every two hours(pillow and wedges)    Nutrition Interventions: Document food/fluid/supplement intake, Offer support with meals,snacks and hydration    Friction and Shear Interventions: Apply protective barrier, creams and emollients, Lift sheet, Lift team/patient mobility team                Problem: Patient Education: Go to Patient Education Activity  Goal: Patient/Family Education  Outcome: Progressing Towards Goal

## 2019-11-18 NOTE — PROGRESS NOTES
Upon calling cardiology consult, it was stated that Dr Nevin Kohler saw patient over weekend and patient was adamant about seeing Dr Mendoza Mai. Upon rounding with patient, Khadijah Lo NP asked patient if she wanted to continue seeing Dr Anastasiya Almanzar or intended to transfer to Dr Mendoza Mai. Patient stated with RN in room that she intends to continue with Dr Anastasiya Almanzar. Efforts have been made to call consult several times with confusion from both practices on which doctor would be seen. This RN clarified with patient that she does in fact wish to continue with Dr Anastasiya Almanzar. 80- Cardiology to see tomorrow.

## 2019-11-18 NOTE — PROGRESS NOTES
Hospitalist Progress Note  Fernando Ocampo MD  Answering service: [phone] from in house phone        Date of Service:  2019  NAME:  Thao Bennett  :  1940  MRN:  [id number]      Admission Summary:   Thao Bennett is a 67 y.o. female who presents with increasing leg swelling, sob      67 y.o. female with past medical history significant for obesity, lymphedema, elevated antinuclear antibody level, pleural effusion,  HTN, a-fib,  hearing loss, CHF, leg swelling, COPD, gout,  CKD, hypothyroidism, GERD, sleep apnea,morbid obesity, and breast cancer who presents from home via EMS with chief complaint of leg swelling and sob. Patient presents to ED with worsening SOB and lymphedema. Patient states her leg swelling is increasing and now has onset of blisters to both her legs bilaterally. Patient notes she has an old laceration to her right shin with old stiches still in place. Patient states due to her lymphedema she is unable to ambulate without heavy assistance. Patient reports she has stopped taking her Lasix because of her lack of mobility to use the bathroom when she takes Lasix. Patient notes she was previously seen in the hospital for extreme lymphedema, when she weighed over 400 lbs and was brought down to 300 lbs after fluid removal, while stating she is back to over 400 lbs today. Patient also complains of becoming easily SOB when she is stressed or overactive. Patient states her SOB has become increasingly prevalent over the past month. Patient affirms she still takes coumadin. Patient affirms SOB, leg swelling, weakness, and wounds. Patient denies fever and chills. There are no other acute medical concerns at this time. Interval history / Subjective:     F/u Bilateral pedal edema/KENT   Pedal edema persists  Having good urine output.    Assessment & Plan:     SOB/Pedal edema  -sec to Acute on chronic BV diastolic congestive heart failure vs lymphedema:   -fluid overload due to her stopping her home diuretics  -I/O, negative 3l  -Daily weight, negative 3lbs since admission  -Echo EF 45%, global hypokinesis  -Appreciate Cardiology  -Was getting IV diuresis but with worsening renal function, diuresis on hold from 11/18    Severe tricuspid valve regurgitation  -Follow Cardiology     Renal dysfunction on CKD stage 3  -in the setting of IV diuresis  -Appreciate Nephrology    Chronic bilateral lymphedema with wound:   -wound care consult     Chronic afib:   -continue coumadin. Rate controlled   -Appreciate Cardiology    Reported medication non compliance  -Counseled    Morbid obese  -Nutrition input    GIANNA  -does not use CPAP    Cardiac diet    PT/OT SNF    Code status: FULL CODE  DVT prophylaxis: Coumadin  PTA: home  Baseline: bed bound    Plan: CM working for SNF, Follow Cardiology/Nephrology    Care Plan discussed with: Patient/Family  Disposition: TBD     Hospital Problems  Date Reviewed: 11/16/2019          Codes Class Noted POA    * (Principal) Hypervolemia ICD-10-CM: E87.70  ICD-9-CM: 276.69  11/15/2019 Unknown        Bilateral leg edema ICD-10-CM: R60.0  ICD-9-CM: 782.3  11/15/2019 Unknown        Acute on chronic diastolic heart failure ([facility] [address] ICD-10-CM: I50.33  ICD-9-CM: 428.33  10/6/2017 Yes                Review of Systems:   A comprehensive review of systems was negative except for that written in the HPI. Vital Signs:    Last 24hrs VS reviewed since prior progress note. Most recent are:  Visit Vitals  /66   Pulse (!) 108   Temp 97.9 °F (36.6 °C)   Resp 18   Ht 5' 9\" (1.753 m)   Wt (!) 180.4 kg (397 lb 9.6 oz)   SpO2 93%   Breastfeeding?  No   BMI 58.72 kg/m²         Intake/Output Summary (Last 24 hours) at 11/18/2019 1452  Last data filed at 11/18/2019 0800  Gross per 24 hour   Intake 360 ml   Output 4400 ml   Net -4040 ml        Physical Examination:             Constitutional:  No acute distress, cooperative, pleasant ENT:  Oral mucous moist, oropharynx benign. Resp:  CTA bilaterally. No wheezing/rhonchi/rales. No accessory muscle use   CV:  Regular rhythm, normal rate, no murmurs, gallops, rubs    GI:  Soft, non distended, non tender. normoactive bowel sounds, no hepatosplenomegaly     Musculoskeletal:  Bilateral pedal edema 3+, warm, 2+ pulses throughout    Neurologic:  Moves all extremities. AAOx3, CN II-XII reviewed     Skin:  Good turgor, no rashes or ulcers       Data Review:    Review and/or order of clinical lab test      Labs:     Recent Labs     11/16/19 0256   WBC 7.2   HGB 11.4*   HCT 37.3        Recent Labs     11/18/19 0257 11/17/19 0124 11/16/19 0256    140 141   K 3.8 4.3 4.3    106 108   CO2 31 29 30   BUN 30* 29* 26*   CREA 1.91* 1.73* 1.47*   * 107* 117*   CA 8.9 8.7 9.0   MG  --   --  1.9   PHOS  --   --  3.1     Recent Labs     11/16/19 0256   SGOT 11*   ALT 14   *   TBILI 1.2*   TP 6.5   ALB 3.4*   GLOB 3.1     Recent Labs     11/18/19 0257 11/17/19 0124 11/16/19 0256   INR 2.0* 2.5* 2.6*   PTP 19.4* 24.4* 25.4*      No results for input(s): FE, TIBC, PSAT, FERR in the last 72 hours. No results found for: FOL, RBCF   No results for input(s): PH, PCO2, PO2 in the last 72 hours. No results for input(s): CPK, CKNDX, TROIQ in the last 72 hours.     No lab exists for component: CPKMB  No results found for: CHOL, CHOLX, CHLST, CHOLV, HDL, HDLP, LDL, LDLC, DLDLP, TGLX, TRIGL, TRIGP, CHHD, [facility]  Lab Results   Component Value Date/Time    Glucose (POC) 98 10/07/2017 11:07 AM    Glucose (POC) 92 10/07/2017 06:50 AM    Glucose (POC) 169 (H) 12/05/2016 04:43 PM    Glucose (POC) 126 (H) 12/05/2016 12:15 PM    Glucose (POC) 112 (H) 12/05/2016 08:23 AM     Lab Results   Component Value Date/Time    Color DARK YELLOW 12/02/2016 05:46 PM    Appearance TURBID (A) 12/02/2016 05:46 PM    Specific gravity 1.022 12/02/2016 05:46 PM    Specific gravity 1.015 03/16/2012 01:55 AM pH (UA) 5.0 12/02/2016 05:46 PM    Protein 30 (A) 12/02/2016 05:46 PM    Glucose NEGATIVE  12/02/2016 05:46 PM    Ketone TRACE (A) 12/02/2016 05:46 PM    Bilirubin NEGATIVE  07/08/2012 12:10 AM    Urobilinogen 1.0 12/02/2016 05:46 PM    Nitrites NEGATIVE  12/02/2016 05:46 PM    Leukocyte Esterase NEGATIVE  12/02/2016 05:46 PM    Epithelial cells MANY (A) 12/02/2016 05:46 PM    Bacteria 1+ (A) 12/02/2016 05:46 PM    WBC 0-4 12/02/2016 05:46 PM    RBC 0-5 12/02/2016 05:46 PM         Medications Reviewed:     Current Facility-Administered Medications   Medication Dose Route Frequency    warfarin (COUMADIN) tablet 5 mg  5 mg Oral ONCE    influenza vaccine 2019-20 (6 mos+)(PF) (FLUARIX/FLULAVAL/FLUZONE QUAD) injection 0.5 mL  0.5 mL IntraMUSCular PRIOR TO DISCHARGE    levothyroxine (SYNTHROID) tablet 112 mcg  112 mcg Oral 6am    sodium chloride (OCEAN) 0.65 % nasal squeeze bottle 2 Spray  2 Spray Both Nostrils Q2H PRN    allopurinol (ZYLOPRIM) tablet 100 mg  100 mg Oral DAILY    carvedilol (COREG) tablet 12.5 mg  12.5 mg Oral BID WITH MEALS    docusate sodium (COLACE) capsule 100 mg  100 mg Oral BID PRN    [Held by provider] furosemide (LASIX) injection 80 mg  80 mg IntraVENous BID    gabapentin (NEURONTIN) capsule 400 mg  400 mg Oral QHS    pantoprazole (PROTONIX) tablet 40 mg  40 mg Oral ACB    miconazole (MICOTIN) 2 % powder   Topical BID    spironolactone (ALDACTONE) tablet 25 mg  25 mg Oral DAILY    sodium chloride (NS) flush 5-40 mL  5-40 mL IntraVENous Q8H    sodium chloride (NS) flush 5-40 mL  5-40 mL IntraVENous PRN    naloxone (NARCAN) injection 0.4 mg  0.4 mg IntraVENous PRN    zolpidem (AMBIEN) tablet 5 mg  5 mg Oral QHS PRN    acetaminophen (TYLENOL) tablet 650 mg  650 mg Oral Q4H PRN    ondansetron (ZOFRAN) injection 4 mg  4 mg IntraVENous Q4H PRN    venlafaxine-SR (EFFEXOR-XR) capsule 75 mg  75 mg Oral DAILY WITH BREAKFAST    Warfarin - pharmacy to dose   Other Rx Dosing/Monitoring ______________________________________________________________________  EXPECTED LENGTH OF STAY: 4d 2h  ACTUAL LENGTH OF STAY:          Wash MD Brendon

## 2019-11-18 NOTE — WOUND CARE
WOCN Note:  
 
New consult placed for assessment of bilateral low leg wounds and abdominal folds. Chart reviewed. Admitted DX: Bilateral leg edema; Hypervolemia Assessment:  
Patient is A&O x 3, communicative and requires assist with repositioning. Bed: [facility] Patient has a pure wick. Patient reports no pain. Heels intact without erythema and offloaded on pillows. 1. POA Right low leg, full thickness wound: 2 x 6.5 x 1.5 cm  100% red. Small serosanguinous exudate. Periwound without erythema. 2.  POA Left low leg, partial thickness wounds from edema:  Cluster of 3 wounds within an area 4 x 2.5 x 0.1 cm; 100% red; small serous weeping exudate. 3.  POA Sacrum, non blanching purple 3 x 2 x 0 cm. Recommendations:   
1. Upgrade bed 2. Turn team 
3. Right and left wounds:  Every other day (even days) cleanse with saline; apply Alejandra (found on 5e); cover with dry dressing. 4.  Abdominal folds:  Antifungal powder BID 5. Sacrum:  Venelex TID Skin Care & Pressure Prevention: 
Minimize layers of linen/pads under patient to optimize support surface. Turn/reposition approximately every 2 hours and offload heels. Manage incontinence / promote continence Specialty bed: compella ordered via [employer]. Use only flat sheet and one incontinence pad. Please call [facility] ([phone]) if not delivered. [phone]. Discussed above plan with patient and Martha iVramontes RN. Transition of Care: Plan to follow as needed while admitted to hospital. 
 
Corinne Girt, BSN RN [facility]ound [facility] Office [phone] Pager [phone]

## 2019-11-18 NOTE — CONSULTS
[address]    Name:  Baldomero Serrano  MR#:  [id number]  :  1940  ACCOUNT #:  [de-identified]  DATE OF SERVICE:  2019    REASON FOR CONSULTATION:  Seen for chronic kidney disease and volume overload. HISTORY OF PRESENT ILLNESS:  She is known to have CKD stage III, sees Dr. Ritu Veronica. Chronic lymphedema and she has been not taking her diuretics, came with some weight gain and needing IV diuresis. PAST MEDICAL HISTORY:  1.  CKD stage III. 2.  Hypertension. 3.  Lymphedema. 4.  COPD.  5.  Obesity. 6.  Pleural effusion. 7.  Back surgery. 8.  . MEDICATIONS AS INPATIENT:  Include:  1. Allopurinol. 2.  Coreg. 3.  Lasix 80 IV twice a day. 4.  Neurontin 400 every bedtime. 5.  Levothyroxine. 6.  Protonix. 7.  Venlafaxine. 8.  Spironolactone. 9.  Coumadin. ALLERGIES:  LATEX, GRASS, AND SULFA. SOCIAL HISTORY:  Reviewed. FAMILY HISTORY:  Reviewed. REVIEW OF SYSTEMS:  Look at the HPI. Rest is negative. PHYSICAL EXAMINATION:  GENERAL:  Not in acute distress. Urine output yesterday around 4 L. VITAL SIGNS:  Blood pressure 117/66, satting 98%. NECK:  JVD is difficult to assess. LUNGS:  Decreased breath sounds. EXTREMITIES:  Lower extremity edema, swollen, but not lot of pitting edema. LABORATORY DATA:  There is no CBC today. BUN 30, creatinine 1.9, potassium is 3.8. No magnesium or phosphorus. IMPRESSION:  1. Chronic kidney disease stage III. Creatinine is little bit worse in the setting of diuresis. 2.  Volume overload. 3.  Lymphedema. 4.  Obesity. RECOMMENDATIONS:  1.  IV diuretics for now, same dose. 2.  Check magnesium and phosphorus in the morning. 3.  If creatinine worse, we will back off from the IV diuretics. 4.  Discussed in length with her and her .       MD BAM [id number]  D:  2019 14:38  T:  2019 15:41  JOB #:  [id number]

## 2019-11-18 NOTE — PROGRESS NOTES
Occupational therapy 8333 -   11.18.2019    Chart reviewed in prep for OT evaluation. RN reporting they are in the process of changing out patient bed. Will defer and f/u later in AM/PM as able to complete evaluation, thank you. Landen Sosa MS, OTR/L

## 2019-11-18 NOTE — CONSULTS
Seen and examined  Thanks for the consult  A/P:  CKD stage 3  CRISTINA,hemodynamics in the setting of diuresis  Lymphedema  Volume overload  Obesity    IV diuretics  Monitor renal function and electrolytes

## 2019-11-18 NOTE — PROGRESS NOTES
Pharmacist Note - Warfarin Dosing  Consult provided for this 67 y. o.female to manage warfarin for Atrial Fibrillation    INR Goal: 2 - 3    Home regimen/ tablet size: 2.5 mg daily, except 5 mg on Mondays    Drugs that may increase INR: None  Drugs that may decrease INR: None  Other current anticoagulants/ drugs that may increase bleeding risk: allopurinol  Risk factors: Age > 65  Daily INR ordered: YES    Recent Labs     11/18/19  0257 11/17/19  0124 11/16/19  0256 11/15/19  1418   HGB  --   --  11.4* 12.0   INR 2.0* 2.5* 2.6* 2.5*     Date               INR                  Dose  11/15  2.5  2.5   11/16  2.6  2.5   11/17  2.5  2.5  11/18              2.0                   5 mg                                                                                Assessment/ Plan: Will order warfarin 5 mg PO x 1 dose. Pharmacy will continue to monitor daily and adjust therapy as indicated. Please contact the pharmacist at   for outpatient recommendations if needed.

## 2019-11-18 NOTE — PROGRESS NOTES
Problem: Mobility Impaired (Adult and Pediatric)  Goal: *Acute Goals and Plan of Care (Insert Text)  Description  FUNCTIONAL STATUS PRIOR TO ADMISSION: Patient was fully dependent and non ambulatory/not transferring for 6 weeks. HOME SUPPORT PRIOR TO ADMISSION: The patient lived with family at unknown level of support. Physical Therapy Goals  Initiated 11/16/2019  1. Patient will move from supine to sit and sit to supine , scoot up and down and roll side to side in bed with maximal assistance within 7 day(s). 2.  Patient will transfer from bed to chair and chair to bed with maximal assistance using the least restrictive device within 7 day(s). 3.  Patient will perform AROM to BLE within 7 day(s). Outcome: Progressing Towards Goal   PHYSICAL THERAPY TREATMENT  Patient: Royal Rodriguez (53 y.o. female)  Date: 11/18/2019  Diagnosis: Bilateral leg edema [R60.0]  Hypervolemia [E87.70] Hypervolemia       Precautions: Fall  Chart, physical therapy assessment, plan of care and goals were reviewed. ASSESSMENT  Patient continues with skilled PT services and is slowly progressing towards goals. Pt presents with decreased endurance, significantly decreased strength BLE, decreased ROM BLE with decreased tolerance for assistance to move BLE, severe edema/lymphedema BLE, morbid obesity, decreased dynamic sitting balance, and significantly impaired functional mobility. Pt reports she sleeps in a recliner at home and has had significant difficulty with transferring from the recliner to a wheelchair for the last few weeks. Pt required much additional time and encouragement to complete transfer to sitting EOB with minimal assist x 2. Once sitting pt demonstrated good sitting balance and tolerated sitting~15 min. Pt reported KENT following transfers and while brushing her teeth with SPO2 93% on RA.  Pt had increased difficulty transferring sit to supine requiring 2 person assistance to lift BLE into bed and modified bed to assist with sliding to [facility]. Pt continued to c/o KENT in supine position but did recover with HOB elevated. Current Level of Function Impacting Discharge (mobility/balance): supine to sit with minimal assist x 2, sit to supine with moderate assist x 2, tolerated sitting EOB  ~ 15 min    Other factors to consider for discharge: fall risk, requires 2 person assistance, functional decline in the last 2 weeks and will require intensive therapy with possible mechanical lifts to safely progress with mobility          PLAN :  Patient continues to benefit from skilled intervention to address the above impairments. Continue treatment per established plan of care. to address goals. Recommendation for discharge: (in order for the patient to meet his/her long term goals)  Therapy up to 5 days/week in SNF setting    This discharge recommendation:  Has been made in collaboration with the attending provider and/or case management    IF patient discharges home will need the following DME: bedside commode, hospital bed and mechanical lift       SUBJECTIVE:   Patient stated You all don't give me much time.     OBJECTIVE DATA SUMMARY:   Critical Behavior:  Neurologic State: Alert  Orientation Level: Oriented X4  Cognition: Appropriate for age attention/concentration, Follows commands  Safety/Judgement: Awareness of environment, Fall prevention  Functional Mobility Training:  Bed Mobility:     Supine to Sit: Minimum assistance; Additional time;Assist x2, used bilateral hand rails, tolerated sitting EOB  ~ 15 min  Sit to Supine: Moderate assistance;Assist x2; Additional time; Adaptive equipment  Scooting: Minimum assistance; Additional time;Assist x2                                   Balance:  Sitting: Impaired  Sitting - Static: Good (unsupported)  Sitting - Dynamic: Fair (occasional)              Pain Rating:  Verbal: did not rate, c/o discomfort with assistance to move BLE during transfers and while removing and replacing pillows under legs     Activity Tolerance:   Fair, + KENT with minimal activity, discomfort with minimal movement of BLE  Please refer to the flowsheet for vital signs taken during this treatment.     After treatment patient left in no apparent distress:   Supine in bed and Call bell within reach    COMMUNICATION/COLLABORATION:   The patients plan of care was discussed with: Registered Nurse    Amy Arthor Heimlich   Time Calculation: 51 mins

## 2019-11-18 NOTE — PROGRESS NOTES
Bedside shift change report given to Dawood Shah (oncoming nurse) by Be Schultz RN (offgoing nurse). Report included the following information SBAR and Kardex.

## 2019-11-18 NOTE — PROGRESS NOTES
MOHIT:    Referral sent to [facility]. [place] of Choice form in file.     Anat Vasquez RN/CRM

## 2019-11-19 ENCOUNTER — APPOINTMENT (OUTPATIENT)
Dept: GENERAL RADIOLOGY | Age: 79
DRG: 291 | End: 2019-11-19
Attending: NURSE PRACTITIONER
Payer: MEDICARE

## 2019-11-19 LAB
ANION GAP SERPL CALC-SCNC: 5 MMOL/L (ref 5–15)
BUN SERPL-MCNC: 32 MG/DL (ref 6–20)
BUN/CREAT SERPL: 18 (ref 12–20)
CALCIUM SERPL-MCNC: 8.7 MG/DL (ref 8.5–10.1)
CHLORIDE SERPL-SCNC: 102 MMOL/L (ref 97–108)
CO2 SERPL-SCNC: 32 MMOL/L (ref 21–32)
CREAT SERPL-MCNC: 1.79 MG/DL (ref 0.55–1.02)
GLUCOSE SERPL-MCNC: 100 MG/DL (ref 65–100)
INR PPP: 1.7 (ref 0.9–1.1)
MAGNESIUM SERPL-MCNC: 1.8 MG/DL (ref 1.6–2.4)
MAGNESIUM SERPL-MCNC: 2.1 MG/DL (ref 1.6–2.4)
PHOSPHATE SERPL-MCNC: 3.3 MG/DL (ref 2.6–4.7)
POTASSIUM SERPL-SCNC: 3.7 MMOL/L (ref 3.5–5.1)
PROTHROMBIN TIME: 16.5 SEC (ref 9–11.1)
SODIUM SERPL-SCNC: 139 MMOL/L (ref 136–145)

## 2019-11-19 PROCEDURE — 71045 X-RAY EXAM CHEST 1 VIEW: CPT

## 2019-11-19 PROCEDURE — 83735 ASSAY OF MAGNESIUM: CPT

## 2019-11-19 PROCEDURE — 80048 BASIC METABOLIC PNL TOTAL CA: CPT

## 2019-11-19 PROCEDURE — 77030038269 HC DRN EXT URIN PURWCK BARD -A

## 2019-11-19 PROCEDURE — 36415 COLL VENOUS BLD VENIPUNCTURE: CPT

## 2019-11-19 PROCEDURE — 65270000029 HC RM PRIVATE

## 2019-11-19 PROCEDURE — 74011250636 HC RX REV CODE- 250/636: Performed by: INTERNAL MEDICINE

## 2019-11-19 PROCEDURE — 74011250637 HC RX REV CODE- 250/637: Performed by: HOSPITALIST

## 2019-11-19 PROCEDURE — 85610 PROTHROMBIN TIME: CPT

## 2019-11-19 PROCEDURE — 84100 ASSAY OF PHOSPHORUS: CPT

## 2019-11-19 RX ORDER — WARFARIN SODIUM 5 MG/1
5 TABLET ORAL ONCE
Status: COMPLETED | OUTPATIENT
Start: 2019-11-19 | End: 2019-11-19

## 2019-11-19 RX ORDER — MAGNESIUM SULFATE HEPTAHYDRATE 40 MG/ML
2 INJECTION, SOLUTION INTRAVENOUS ONCE
Status: COMPLETED | OUTPATIENT
Start: 2019-11-19 | End: 2019-11-19

## 2019-11-19 RX ADMIN — Medication 10 ML: at 21:24

## 2019-11-19 RX ADMIN — CASTOR OIL AND BALSAM, PERU: 788; 87 OINTMENT TOPICAL at 17:39

## 2019-11-19 RX ADMIN — WARFARIN SODIUM 5 MG: 5 TABLET ORAL at 16:09

## 2019-11-19 RX ADMIN — LEVOTHYROXINE SODIUM 112 MCG: 112 TABLET ORAL at 07:37

## 2019-11-19 RX ADMIN — CARVEDILOL 12.5 MG: 12.5 TABLET, FILM COATED ORAL at 16:09

## 2019-11-19 RX ADMIN — ALLOPURINOL 100 MG: 100 TABLET ORAL at 09:29

## 2019-11-19 RX ADMIN — SPIRONOLACTONE 25 MG: 25 TABLET ORAL at 09:29

## 2019-11-19 RX ADMIN — VENLAFAXINE HYDROCHLORIDE 75 MG: 37.5 CAPSULE, EXTENDED RELEASE ORAL at 07:37

## 2019-11-19 RX ADMIN — Medication 10 ML: at 07:39

## 2019-11-19 RX ADMIN — MICONAZOLE NITRATE 2 % TOPICAL POWDER: at 09:30

## 2019-11-19 RX ADMIN — PANTOPRAZOLE SODIUM 40 MG: 40 TABLET, DELAYED RELEASE ORAL at 07:37

## 2019-11-19 RX ADMIN — ACETAMINOPHEN 650 MG: 325 TABLET, FILM COATED ORAL at 04:31

## 2019-11-19 RX ADMIN — MICONAZOLE NITRATE 2 % TOPICAL POWDER: at 17:39

## 2019-11-19 RX ADMIN — Medication 10 ML: at 16:11

## 2019-11-19 RX ADMIN — MAGNESIUM SULFATE HEPTAHYDRATE 2 G: 40 INJECTION, SOLUTION INTRAVENOUS at 09:31

## 2019-11-19 RX ADMIN — CASTOR OIL AND BALSAM, PERU: 788; 87 OINTMENT TOPICAL at 09:30

## 2019-11-19 RX ADMIN — GABAPENTIN 400 MG: 400 CAPSULE ORAL at 21:23

## 2019-11-19 NOTE — PROGRESS NOTES
aTransitional Care Team: Initial HUG Note    Date of Assessment: 11/19/19  Time of Assessment:  12:36 PM    Lida Dean is a 67 y.o. female inpatient at [facility],409 is resting in bed post lunch when writer enters the room. This writer introduces self and Loyda Hernandez José Miguel Nathaly is given permission to proceed. Pt initially c/o shortness of breath, increased edema and blistering to lower extremities. She has been accepted to [facility] for continued rehab but pt has reservations about this. She goes on to say she's not sure she can stand. She would have liked more PT while IP. Cardiac: 11/16 ECHO= EF 45% Severe pulmonary hypertension, severe tricuspid regurgitation; chronic lower extremity edema thought to be lymphedema. Fluid overload due to her stopping her home diuretics. Chronic Afib on Coumadin. Renal: CKD stage 3; history of not attending appointments with Dr. Elizabeth Moscoso, Cr level not in range d/t iv diuretics. 11/16/2019 02:56 11/17/2019 01:24 11/18/2019 02:57 11/19/2019 03:55   BUN  26 (H) 29 (H) 30 (H) 32 (H)   Creatinine  1.47 (H) 1.73 (H) 1.91 (H) 1.79 (H)     Lymphedema: Chronic, previous care/treatment included [address] [place] [address] Lymphedema clinic. Last visit was in 2018. Pt reports not being able to apply compression devices recently. Reports having called for another appointment but was told the practice didn't have openings d/t staffing. Wound care has seen pt and recommends 1. Upgrade bed 2. Turn team 3. Right and left wounds:  Every other day (even days) cleanse with saline; apply Alejandra (found on 5e); cover with dry dressing. 4.  Abdominal folds:  Antifungal powder BID 5. Sacrum:  Venelex TID     Primary Diagnosis: Hypervolemia    Advance Directive:  not on file; education provided. Pt has been asked to have nurse page pastoral care should she choose to complete blank on her bedside table once her  returns later this afternoon.       Current Code Status:  Full Code    Referral to Hospice/ Palliative Care Appropriate: no.    Awareness of Medical Conditions: (Trajectory of illness and pts expectations). Mrs. Alicia Olivas is asked to consider the reason for this admission and her health history- whats her opinion about her overall health condition. She replies \"I can't say if anything has improved\". She isn't happy that she can't stand long enough to transfer from a wheelchair to a chair nor to go to the restroom. Mrs. Alicia Olivas expresses how she would like to be able to travel and enjoy beautiful days outside. She has a old ACP on hand but doesn't agree with all the verbiage. She's not sure if she wants full code or DNR status- \"I don't want to be resuscitated if I'm going to be a vegetable\". This writer explains there are no guarantees when it comes to this process. She has agreed to think about it more and discuss with her . When ready she will seek assistance with completing ACP. Mrs. Alicia Olivas is then asked if there is anything that can be done post discharge to prevent to this type of admission again. Pt reports being compliant with taking her Lasix as indicated and attending provider appointments. Discharge Needs: (to include safety issues) None identified during conversation with pt    Barriers Identified:   Chronic Lymphedema  Weight (185 kg)  Medication non- compliance (Lasix)   Wheelchair bound/decreased mobility    Patient is willing to go to SNF/Inpatient Rehab if recommended: yes    Medication Review:  was not performed, awaiting final med list on AVS.    Can patient afford medications:  yes. Patient is Compliant with Medication regimen:    Who manages medications at home:     Best Patient Contact Number: [phone]       HUG (Healthy Understanding of Goals) program introduced to patient/family. The Transitional Care Team bridges the gaps in care and education surrounding discharge from the acute care facility.  The objective is to empower the patient and family in taking a proactive role in preventing readmission within the first thirty days after discharge. The team is also involved in the efforts to reduce readmission to the acute care setting after stabilization and discharge from the acute care environment either to skilled nursing facilities or community. [facility] RN/NP will return with [facility] Calendar/ follow up appointments/ Ambulatory Nurse Navigator name and contact information when the patient is ready for discharge. No future appointments. Non-[facility] follow up appointment(s): TBD    Dispatch Health: call information not given as pt recalls a previous experience that didn't go so well. Patient education focused on readmission zones as described as: The Red Zone: High risk for readmission, days 1-21  The Yellow Zone: Moderate  risk for readmission, days 22-29   The Green Zone: Lower risk for readmission, days                30 and after    The [facility] Team will attempt to follow the patient from a distance while inpatient as well as be available for further transition/disposition needs.  .    Past Medical History:   Diagnosis Date    Arrhythmia     A Fib    Arthritis     Atrial fibrillation (HCC)     Cancer (HCC)     left breast cancer    Cardiomegaly     CKD (chronic kidney disease) stage 3, GFR 30-59 ml/min (HCC)     judith    Clostridium difficile infection     Congestive heart failure, unspecified     COPD     Diarrhea     /constipation    Dyspepsia and other specified disorders of function of stomach     Elevated antinuclear antibody (MARYAM) level     Endocrine disease     hypothyroidism    GERD (gastroesophageal reflux disease)     Hearing loss     Heart failure (HCC)     Hypertension     Hypertensive heart disease with heart failure (HCC)     diastolic dysfunction    Joint pain     Leg swelling     Long term (current) use of anticoagulants     Lymphedema     Morbid obesity (HCC)     Obesity, unspecified     Other ill-defined conditions(799.89)     lymph edema    Pleural effusion     s/p VATS    Shortness of breath     Thyroid disease     Unspecified sleep apnea     Does not use machine

## 2019-11-19 NOTE — PROGRESS NOTES
CM offered screening for Medicaid Long-Term Services & Supports. Patient Consented to screening.     Howie Durham RN/CRM

## 2019-11-19 NOTE — PROGRESS NOTES
MOHIT:    [facility] can accept patient at 2:30pm tomorrow. [facility] scheduled for transport at 2:00pm tomorrow. Awaiting discharge orders tomorrow.     Jannet Litten, RN/CRM

## 2019-11-19 NOTE — PROGRESS NOTES
[facility] Screening   Previous Assessment Search Results Assessment Tracking Number: [id number] Status: Submitted for Processing    Assessment Date: 11/12/2019    Wendy Maxime Information:   Screener's Name: [facility] DEPT  NPI: [id number] Provider Name: [address] [place] [address] Drive Information:  Screener's Name: Mor Brock  NPI: [id number] Provider Name: Davin Hawley DEPT [facility] Information:  Screener's Name:   NPI: Provider Name:

## 2019-11-19 NOTE — PROGRESS NOTES
RENAL  PROGRESS NOTE        Subjective:     Doing well  Objective:   VITALS SIGNS:    Visit Vitals  BP 96/57 (BP 1 Location: Right arm, BP Patient Position: At rest)   Pulse 99   Temp 98.2 °F (36.8 °C)   Resp 16   Ht 5' 9\" (1.753 m)   Wt (!) 180.4 kg (397 lb 9.6 oz)   SpO2 91%   Breastfeeding? No   BMI 58.72 kg/m²       O2 Device: Room air       Temp (24hrs), Av.9 °F (36.6 °C), Min:97.6 °F (36.4 °C), Max:98.2 °F (36.8 °C)         PHYSICAL EXAM:  NAD    DATA REVIEW:     INTAKE / OUTPUT:   Last shift:      No intake/output data recorded. Last 3 shifts:  1901 -  0700  In: 1080 [P.O.:1080]  Out: 3900 [Urine:3900]    Intake/Output Summary (Last 24 hours) at 2019 1040  Last data filed at 2019 0030  Gross per 24 hour   Intake 720 ml   Output 2100 ml   Net -1380 ml         LABS:   No results for input(s): WBC, HGB, HCT, PLT, HGBEXT, HCTEXT, PLTEXT in the last 72 hours.   Recent Labs     19  0355 19  0257 19  0124    140 140   K 3.7 3.8 4.3    103 106   CO2 32 31 29    107* 107*   BUN 32* 30* 29*   CREA 1.79* 1.91* 1.73*   CA 8.7 8.9 8.7   MG 1.8  --   --    PHOS 3.3  --   --    INR 1.7* 2.0* 2.5*           Assessment:     CKD stage 3  CRISTINA,hemodynamics in the setting of diuresis  Lymphedema  Volume overload  Obesity      Plan:   Labs better  Good diuresis  Lamar Eduardo MD

## 2019-11-19 NOTE — PROGRESS NOTES
Hospitalist Progress Note  Adali Sweet MD  Answering service: [phone] from in house phone        Date of Service:  2019  NAME:  Myra Modi  :  1940  MRN:  [id number]      Admission Summary:   Myra Modi is a 67 y.o. female who presents with increasing leg swelling, sob      67 y.o. female with past medical history significant for obesity, lymphedema, elevated antinuclear antibody level, pleural effusion,  HTN, a-fib,  hearing loss, CHF, leg swelling, COPD, gout,  CKD, hypothyroidism, GERD, sleep apnea,morbid obesity, and breast cancer who presents from home via EMS with chief complaint of leg swelling and sob. Patient presents to ED with worsening SOB and lymphedema. Patient states her leg swelling is increasing and now has onset of blisters to both her legs bilaterally. Patient notes she has an old laceration to her right shin with old stiches still in place. Patient states due to her lymphedema she is unable to ambulate without heavy assistance. Patient reports she has stopped taking her Lasix because of her lack of mobility to use the bathroom when she takes Lasix. Patient notes she was previously seen in the hospital for extreme lymphedema, when she weighed over 400 lbs and was brought down to 300 lbs after fluid removal, while stating she is back to over 400 lbs today. Patient also complains of becoming easily SOB when she is stressed or overactive. Patient states her SOB has become increasingly prevalent over the past month. Patient affirms she still takes coumadin. Patient affirms SOB, leg swelling, weakness, and wounds. Patient denies fever and chills. There are no other acute medical concerns at this time. Interval history / Subjective:     F/u Bilateral pedal edema/KENT   Doing ok today, denies signficant SOB today, but ongoing LE swelling.  Very concerned about going to SNF, and not having pure-wic in place for urine. Reviewed that she will need to continue diuresis at SNF, and at home, despite the inconvenience of having to get up and urinate or having to go in her bedpan/diaper. Reviewed that without the lasix she will have worse swelling which will ultimately be even worse on her mobility.  -1.4L today   Assessment & Plan:     SOB/Pedal edema - slowly improving.  -sec to Acute on chronic BV diastolic congestive heart failure as well as lymphedema:   -fluid overload due to her stopping her home diuretics (pt had difficulty getting up to the bathroom and decided to stop them to avoid unnecessary trips)  -Echo EF 45%, global hypokinesis  -Appreciate Cardiology  -Diuresis on hold due to CRISTINA  - Nephrology following as well. Will need recs for final diuretic regimen for SNF. Severe tricuspid valve regurgitation - stable, per echo. Cardiology following, diurese as able. Renal dysfunction on CKD stage 3  -in the setting of IV diuresis  -Appreciate Nephrology  -Will need close outpatient follow up with Dr. Pooja Magaña  - I and O, avoid nephrotoxins. Chronic bilateral lymphedema with wound:   -wound care consult, would benefit from o/p lymphadema clinic as well     Chronic afib:   -continue coumadin.  Rate controlled   -Appreciate Cardiology    Reported medication non compliance  -Counseled    Morbid obese  -Nutrition input    GIANNA  -does not use CPAP    Cardiac diet    PT/OT SNF    Code status: FULL CODE  DVT prophylaxis: Coumadin  PTA: home  Baseline: bed bound/pivots with some transfers to wheelchair    Care Plan discussed with: Patient/Family  Disposition: Dispo to SNF tomorrow 11/20     Hospital Problems  Date Reviewed: 11/16/2019          Codes Class Noted POA    * (Principal) Hypervolemia ICD-10-CM: E87.70  ICD-9-CM: 276.69  11/15/2019 Unknown        Bilateral leg edema ICD-10-CM: R60.0  ICD-9-CM: 782.3  11/15/2019 Unknown        Acute on chronic diastolic heart failure (HCC) ICD-10-CM: I50.33  ICD-9-CM: 428.33  10/6/2017 Yes                Review of Systems:   A comprehensive review of systems was negative except for that written in the HPI. Vital Signs:    Last 24hrs VS reviewed since prior progress note. Most recent are:  Visit Vitals  /75   Pulse (!) 107   Temp 97.5 °F (36.4 °C)   Resp 16   Ht 5' 9\" (1.753 m)   Wt (!) 185.5 kg (408 lb 15.3 oz)   SpO2 92%   Breastfeeding? No   BMI 60.39 kg/m²         Intake/Output Summary (Last 24 hours) at 11/19/2019 1716  Last data filed at 11/19/2019 0030  Gross per 24 hour   Intake 240 ml   Output 800 ml   Net -560 ml        Physical Examination:             Constitutional:  No acute distress, cooperative, pleasant, obese. ENT:  Oral mucous moist, oropharynx benign. Resp:  CTA bilaterally. No wheezing/rhonchi/rales. No accessory muscle use   CV:  Regular rhythm, normal rate, no murmurs, gallops, rubs    GI:  Soft, non distended, non tender. normoactive bowel sounds, no hepatosplenomegaly     Musculoskeletal:  Bilateral pedal edema 3+ with significant lymphadema, warm, 2+ pulses throughout    Neurologic:  Moves all extremities. AAOx3, CN II-XII reviewed     Skin:  Good turgor, no rashes or ulcers       Data Review:    Review and/or order of clinical lab test  Review and/or order of tests in the radiology section of CPT  Review and/or order of tests in the medicine section of CPT      Labs:     No results for input(s): WBC, HGB, HCT, PLT, HGBEXT, HCTEXT, PLTEXT, HGBEXT, HCTEXT, PLTEXT in the last 72 hours. Recent Labs     11/19/19  0355 11/18/19  0257 11/17/19  0124    140 140   K 3.7 3.8 4.3    103 106   CO2 32 31 29   BUN 32* 30* 29*   CREA 1.79* 1.91* 1.73*    107* 107*   CA 8.7 8.9 8.7   MG 1.8  --   --    PHOS 3.3  --   --      No results for input(s): SGOT, GPT, ALT, AP, TBIL, TBILI, TP, ALB, GLOB, GGT, AML, LPSE in the last 72 hours.     No lab exists for component: [facility], [facility]  Recent Labs     11/19/19  0355 11/18/19  0251 11/17/19  0124   INR 1.7* 2.0* 2.5*   PTP 16.5* 19.4* 24.4*      No results for input(s): FE, TIBC, PSAT, FERR in the last 72 hours. No results found for: FOL, RBCF   No results for input(s): PH, PCO2, PO2 in the last 72 hours. No results for input(s): CPK, CKNDX, TROIQ in the last 72 hours.     No lab exists for component: CPKMB  No results found for: CHOL, CHOLX, CHLST, CHOLV, HDL, HDLP, LDL, LDLC, DLDLP, TGLX, Shady Larger, CHHD, CHHDX  Lab Results   Component Value Date/Time    Glucose (POC) 98 10/07/2017 11:07 AM    Glucose (POC) 92 10/07/2017 06:50 AM    Glucose (POC) 169 (H) 12/05/2016 04:43 PM    Glucose (POC) 126 (H) 12/05/2016 12:15 PM    Glucose (POC) 112 (H) 12/05/2016 08:23 AM     Lab Results   Component Value Date/Time    Color DARK YELLOW 12/02/2016 05:46 PM    Appearance TURBID (A) 12/02/2016 05:46 PM    Specific gravity 1.022 12/02/2016 05:46 PM    Specific gravity 1.015 03/16/2012 01:55 AM    pH (UA) 5.0 12/02/2016 05:46 PM    Protein 30 (A) 12/02/2016 05:46 PM    Glucose NEGATIVE  12/02/2016 05:46 PM    Ketone TRACE (A) 12/02/2016 05:46 PM    Bilirubin NEGATIVE  07/08/2012 12:10 AM    Urobilinogen 1.0 12/02/2016 05:46 PM    Nitrites NEGATIVE  12/02/2016 05:46 PM    Leukocyte Esterase NEGATIVE  12/02/2016 05:46 PM    Epithelial cells MANY (A) 12/02/2016 05:46 PM    Bacteria 1+ (A) 12/02/2016 05:46 PM    WBC 0-4 12/02/2016 05:46 PM    RBC 0-5 12/02/2016 05:46 PM         Medications Reviewed:     Current Facility-Administered Medications   Medication Dose Route Frequency    balsam peru-castor oil (VENELEX) ointment   Topical BID    albuterol-ipratropium (DUO-NEB) 2.5 MG-0.5 MG/3 ML  3 mL Nebulization Q6H PRN    influenza vaccine 2019-20 (6 mos+)(PF) (FLUARIX/FLULAVAL/FLUZONE QUAD) injection 0.5 mL  0.5 mL IntraMUSCular PRIOR TO DISCHARGE    levothyroxine (SYNTHROID) tablet 112 mcg  112 mcg Oral 6am    sodium chloride (OCEAN) 0.65 % nasal squeeze bottle 2 Spray  2 Spray Both Nostrils Q2H PRN  allopurinol (ZYLOPRIM) tablet 100 mg  100 mg Oral DAILY    carvedilol (COREG) tablet 12.5 mg  12.5 mg Oral BID WITH MEALS    docusate sodium (COLACE) capsule 100 mg  100 mg Oral BID PRN    [Held by provider] furosemide (LASIX) injection 80 mg  80 mg IntraVENous BID    gabapentin (NEURONTIN) capsule 400 mg  400 mg Oral QHS    pantoprazole (PROTONIX) tablet 40 mg  40 mg Oral ACB    miconazole (MICOTIN) 2 % powder   Topical BID    spironolactone (ALDACTONE) tablet 25 mg  25 mg Oral DAILY    sodium chloride (NS) flush 5-40 mL  5-40 mL IntraVENous Q8H    sodium chloride (NS) flush 5-40 mL  5-40 mL IntraVENous PRN    naloxone (NARCAN) injection 0.4 mg  0.4 mg IntraVENous PRN    zolpidem (AMBIEN) tablet 5 mg  5 mg Oral QHS PRN    acetaminophen (TYLENOL) tablet 650 mg  650 mg Oral Q4H PRN    ondansetron (ZOFRAN) injection 4 mg  4 mg IntraVENous Q4H PRN    venlafaxine-SR (EFFEXOR-XR) capsule 75 mg  75 mg Oral DAILY WITH BREAKFAST    Warfarin - pharmacy to dose   Other Rx Dosing/Monitoring     ______________________________________________________________________  EXPECTED LENGTH OF STAY: 4d 2h  ACTUAL LENGTH OF STAY:          4                 Kathleen Devlin MD

## 2019-11-19 NOTE — PROGRESS NOTES
Cardiology Progress Note  2019     Admit Date: 11/15/2019  Admit Diagnosis: Bilateral leg edema [R60.0]  Hypervolemia [E87.70]  CC: none currently    Assessment:   Principal Problem:    Hypervolemia (11/15/2019)    Active Problems:    Acute on chronic diastolic heart failure ([address] (10/6/2017)      Bilateral leg edema (11/15/2019)      Plan:   Events noted. Longstanding issues with obesity, CKD, cardiomyopathy with fairly stable ECHO findings. Estimated PA pressure is not particularly high. LVEF 45%. No further cardiac studies and diurese per Nephrology. SOR. Volume status:euvolemic  Renal function: stable    For other plans, see orders. Subjective: Lisandro Crabtree reports   Chest Pain:  [x]   none,  consistent with  []   non-cardiac   []   atypical   []   angina             [x]   none now    []      on-going  Dyspnea: [x]   none  []   at rest  []   with exertion     []   improved   []   unchanged   []   worsening  PND:       [x]   none  []   overnight    Orthopnea: [x]   none  []   improved  []   unchanged  []   worsening  Presyncope: [x]   none   []   improved    []   unchanged    []   worsening  Ambulated in hallway without symptoms  []   Yes  Ambulated in room without symptoms  []   Yes    Objective:    Physical Exam:  Overall VSSAF;    Visit Vitals  BP 96/57 (BP 1 Location: Right arm, BP Patient Position: At rest)   Pulse 99   Temp 98.2 °F (36.8 °C)   Resp 16   Ht 5' 9\" (1.753 m)   Wt (!) 180.4 kg (397 lb 9.6 oz)   SpO2 91%   Breastfeeding?  No   BMI 58.72 kg/m²     Temp (24hrs), Av.9 °F (36.6 °C), Min:97.6 °F (36.4 °C), Max:98.2 °F (36.8 °C)    Patient Vitals for the past 8 hrs:   Pulse   19 0720 99    Patient Vitals for the past 8 hrs:   Resp   19 0720 16    Patient Vitals for the past 8 hrs:   BP   19 0720 96/57          Intake/Output Summary (Last 24 hours) at 2019 1038  Last data filed at 2019 0030  Gross per 24 hour   Intake 720 ml   Output 2100 ml Net -1380 ml       General Appearance: Morbidly obese. Ears/Nose/Mouth/Throat:   Normal MM; anicteric. JVP: WNL   Resp:   Lungs clear to auscultation bilaterally. Nl resp effort. Cardiovascular:  irreg, S1, S2 normal, no new murmur. No gallop or rub. Abdomen:   Soft, non-tender, bowel sounds are present. Extremities: Massive brawny  edema bilaterally. Skin:  Neuro: Warm and dry. A/O x3, grossly nonfocal    []      cath site intact w/o hematoma or bruit; distal pulse unchanged. Recent Labs     11/19/19 0355 11/18/19 0257 11/17/19 0124    140 140   K 3.7 3.8 4.3    103 106   CO2 32 31 29   BUN 32* 30* 29*   CREA 1.79* 1.91* 1.73*    107* 107*   PHOS 3.3  --   --    CA 8.7 8.9 8.7     No results for input(s): SGOT, GPT, ALT, AP, TBIL, TBILI, TP, ALB, GLOB, GGT, AML, LPSE in the last 72 hours. No lab exists for component: AMYP, HLPSE  Recent Labs     11/19/19 0355 11/18/19 0257 11/17/19 0124   INR 1.7* 2.0* 2.5*   PTP 16.5* 19.4* 24.4*      No results for input(s): FE, TIBC, PSAT, FERR in the last 72 hours.    Lab Results   Component Value Date/Time    Glucose (POC) 98 10/07/2017 11:07 AM    Glucose (POC) 92 10/07/2017 06:50 AM    Glucose (POC) 169 (H) 12/05/2016 04:43 PM    Glucose (POC) 126 (H) 12/05/2016 12:15 PM    Glucose (POC) 112 (H) 12/05/2016 08:23 AM       Current Facility-Administered Medications   Medication Dose Route Frequency    balsam peru-castor oil (VENELEX) ointment   Topical BID    albuterol-ipratropium (DUO-NEB) 2.5 MG-0.5 MG/3 ML  3 mL Nebulization Q6H PRN    influenza vaccine 2019-20 (6 mos+)(PF) (FLUARIX/FLULAVAL/FLUZONE QUAD) injection 0.5 mL  0.5 mL IntraMUSCular PRIOR TO DISCHARGE    levothyroxine (SYNTHROID) tablet 112 mcg  112 mcg Oral 6am    sodium chloride (OCEAN) 0.65 % nasal squeeze bottle 2 Spray  2 Spray Both Nostrils Q2H PRN    allopurinol (ZYLOPRIM) tablet 100 mg  100 mg Oral DAILY    carvedilol (COREG) tablet 12.5 mg  12.5 mg Oral BID WITH MEALS    docusate sodium (COLACE) capsule 100 mg  100 mg Oral BID PRN    [Held by provider] furosemide (LASIX) injection 80 mg  80 mg IntraVENous BID    gabapentin (NEURONTIN) capsule 400 mg  400 mg Oral QHS    pantoprazole (PROTONIX) tablet 40 mg  40 mg Oral ACB    miconazole (MICOTIN) 2 % powder   Topical BID    spironolactone (ALDACTONE) tablet 25 mg  25 mg Oral DAILY    sodium chloride (NS) flush 5-40 mL  5-40 mL IntraVENous Q8H    sodium chloride (NS) flush 5-40 mL  5-40 mL IntraVENous PRN    naloxone (NARCAN) injection 0.4 mg  0.4 mg IntraVENous PRN    zolpidem (AMBIEN) tablet 5 mg  5 mg Oral QHS PRN    acetaminophen (TYLENOL) tablet 650 mg  650 mg Oral Q4H PRN    ondansetron (ZOFRAN) injection 4 mg  4 mg IntraVENous Q4H PRN    venlafaxine-SR (EFFEXOR-XR) capsule 75 mg  75 mg Oral DAILY WITH BREAKFAST    Warfarin - pharmacy to dose   Other Rx Dosing/Monitoring        Gary David MD

## 2019-11-19 NOTE — PROGRESS NOTES
RN held morning dose of carvedilol d/t Pt's BP being low (96/57). Pt refused turns overnight except to check for incontinence. Bedside shift change report given to Mary Billings RN (oncoming nurse) by Dann Pierce RN (offgoing nurse). Report included the following information SBAR, Kardex, Intake/Output, MAR and Recent Results.

## 2019-11-19 NOTE — PROGRESS NOTES
Bedside shift change report given to [address] [place] [address] (oncoming nurse) by Arin Cardona RN (offgoing nurse). Report included the following information SBAR, Kardex, Intake/Output and MAR.

## 2019-11-19 NOTE — PROGRESS NOTES
MOHIT:    Manny declined patient.     Referrals be sent to [facility] and [facility] per patient request.    Huyen Cortez RN/CRM

## 2019-11-20 VITALS
DIASTOLIC BLOOD PRESSURE: 69 MMHG | HEART RATE: 98 BPM | WEIGHT: 293 LBS | OXYGEN SATURATION: 98 % | HEIGHT: 69 IN | RESPIRATION RATE: 18 BRPM | TEMPERATURE: 97.4 F | SYSTOLIC BLOOD PRESSURE: 113 MMHG | BODY MASS INDEX: 43.4 KG/M2

## 2019-11-20 LAB
ANION GAP SERPL CALC-SCNC: 5 MMOL/L (ref 5–15)
BUN SERPL-MCNC: 32 MG/DL (ref 6–20)
BUN/CREAT SERPL: 18 (ref 12–20)
CALCIUM SERPL-MCNC: 8.8 MG/DL (ref 8.5–10.1)
CHLORIDE SERPL-SCNC: 103 MMOL/L (ref 97–108)
CO2 SERPL-SCNC: 31 MMOL/L (ref 21–32)
CREAT SERPL-MCNC: 1.74 MG/DL (ref 0.55–1.02)
GLUCOSE SERPL-MCNC: 99 MG/DL (ref 65–100)
INR PPP: 1.6 (ref 0.9–1.1)
POTASSIUM SERPL-SCNC: 3.9 MMOL/L (ref 3.5–5.1)
PROTHROMBIN TIME: 16.2 SEC (ref 9–11.1)
SODIUM SERPL-SCNC: 139 MMOL/L (ref 136–145)

## 2019-11-20 PROCEDURE — 85610 PROTHROMBIN TIME: CPT

## 2019-11-20 PROCEDURE — 74011250637 HC RX REV CODE- 250/637: Performed by: HOSPITALIST

## 2019-11-20 PROCEDURE — 36415 COLL VENOUS BLD VENIPUNCTURE: CPT

## 2019-11-20 PROCEDURE — 90471 IMMUNIZATION ADMIN: CPT

## 2019-11-20 PROCEDURE — 90686 IIV4 VACC NO PRSV 0.5 ML IM: CPT | Performed by: HOSPITALIST

## 2019-11-20 PROCEDURE — 80048 BASIC METABOLIC PNL TOTAL CA: CPT

## 2019-11-20 PROCEDURE — 74011250636 HC RX REV CODE- 250/636: Performed by: HOSPITALIST

## 2019-11-20 RX ORDER — VENLAFAXINE HYDROCHLORIDE 75 MG/1
75 CAPSULE, EXTENDED RELEASE ORAL
Qty: 30 CAP | Refills: 0 | Status: SHIPPED
Start: 2019-11-21 | End: 2019-12-21

## 2019-11-20 RX ADMIN — LEVOTHYROXINE SODIUM 112 MCG: 112 TABLET ORAL at 06:56

## 2019-11-20 RX ADMIN — CARVEDILOL 12.5 MG: 12.5 TABLET, FILM COATED ORAL at 09:06

## 2019-11-20 RX ADMIN — INFLUENZA VIRUS VACCINE 0.5 ML: 15; 15; 15; 15 SUSPENSION INTRAMUSCULAR at 09:05

## 2019-11-20 RX ADMIN — SPIRONOLACTONE 25 MG: 25 TABLET ORAL at 09:06

## 2019-11-20 RX ADMIN — ACETAMINOPHEN 650 MG: 325 TABLET, FILM COATED ORAL at 00:11

## 2019-11-20 RX ADMIN — MICONAZOLE NITRATE 2 % TOPICAL POWDER: at 09:07

## 2019-11-20 RX ADMIN — Medication 10 ML: at 06:57

## 2019-11-20 RX ADMIN — CASTOR OIL AND BALSAM, PERU: 788; 87 OINTMENT TOPICAL at 09:07

## 2019-11-20 RX ADMIN — ALLOPURINOL 100 MG: 100 TABLET ORAL at 09:06

## 2019-11-20 RX ADMIN — PANTOPRAZOLE SODIUM 40 MG: 40 TABLET, DELAYED RELEASE ORAL at 06:56

## 2019-11-20 RX ADMIN — VENLAFAXINE HYDROCHLORIDE 75 MG: 37.5 CAPSULE, EXTENDED RELEASE ORAL at 09:06

## 2019-11-20 NOTE — PROGRESS NOTES
CM verified patient has a qualifying hospital stay for [facility].     Carmela Conde RN, BSN  Care Management Department

## 2019-11-20 NOTE — PROGRESS NOTES
Transitional Care Team: Discharge HUG Note    Date of Assessment: 11/20/19  Time of Assessment:  1:35 PM    Rebel Levine is a 67 y.o. female inpatient at [facility] is being discharged to [facility] via [facility]. Pt continues to have reservation about this because of not knowing about the facility first hand. This writer again tries to ease pt's anxiety. Hypervolemia: SOB/Pedal edema - slowly improving.  -sec to Acute on chronic BV diastolic congestive heart failure as well as lymphedema:   -fluid overload due to her stopping her home diuretics (pt had difficulty getting up to the bathroom and decided to stop them to avoid unnecessary trips)    Chronic bilateral lymphedema with wound:   -wound care consult, would benefit from o/p [facility] clinic as well     -Resume prior home Lasix dose 80 mg twice daily  -Continue home dose venlafaxine.  -Continue to weigh yourself daily call your cardiologist or nephrologist if you notice any large fluctuations in weight 1 to 2 pounds within 24 hours    Last noted chest xray (11/19/19)-   1. Continued evidence of marked cardiomegaly. 2. Prominence of the pulmonary interstitial markings as described above. 3. Presence of some increased density at the left lung base. Parenchymal disease  in this region may be present. Primary Discharge Diagnosis: Hypervolemia    Advance Directive:  not on file; education provided. Additional education provided with  in the room, not ready to complete at this time. Current Code Status:  Full Code    Discharge Needs: (to include safety issues) None identified    Barriers Identified:   Chronic Lymphedema  Weight (185 kg)  Medication non- compliance (Lasix)   Wheelchair bound/decreased mobility    Medication Review:  was performed via PharmD    Best Patient Contact Number: [phone]       HUG (Healthy Understanding of Goals) program introduced to patient/family.  The Transitional Care Team bridges the gaps in care and education surrounding discharge from the acute care facility. The objective is to empower the patient and family in taking a proactive role in preventing readmission within the first thirty days after discharge. The team is also involved in the efforts to reduce readmission to the acute care setting after stabilization and discharge from the acute care environment either to skilled nursing facilities or community. [facility] RN/NP will return with [facility] Calendar/ follow up appointments/ Ambulatory Nurse Navigator name and contact information when the patient is ready for discharge. No future appointments. Non-[facility] follow up appointment(s):   TBD- Nephrology- Dr. Maia Alegre office will contact the pt directly    [facility]: information not given d/t pt not liking a previous experience. Patient education focused on readmission zones as described as: The Red Zone: High risk for readmission, days 1-21  The Yellow Zone: Moderate  risk for readmission, days 22-29   The Green Zone: Lower risk for readmission, days                30 and after    [facility] team will continue to offer support during the 30- 90 day discharge from acute care setting.   Will notify Ambulatory Community Care Team.     Past Medical History:   Diagnosis Date    Arrhythmia     A Fib    Arthritis     Atrial fibrillation ([address]     Cancer ([address]     left breast cancer    Cardiomegaly     CKD (chronic kidney disease) stage 3, GFR 30-59 ml/min (HCC)     judith    Clostridium difficile infection     Congestive heart failure, unspecified     COPD     Diarrhea     /constipation    Dyspepsia and other specified disorders of function of stomach     Elevated antinuclear antibody (MARYAM) level     Endocrine disease     hypothyroidism    GERD (gastroesophageal reflux disease)     Hearing loss     Heart failure ([address]     Hypertension     Hypertensive heart disease with heart failure ([address] diastolic dysfunction    Joint pain     Leg swelling     Long term (current) use of anticoagulants     Lymphedema     Morbid obesity (HCC)     Obesity, unspecified     Other ill-defined conditions(799.89)     lymph edema    Pleural effusion     s/p VATS    Shortness of breath     Thyroid disease     Unspecified sleep apnea     Does not use machine

## 2019-11-20 NOTE — PROGRESS NOTES
TRANSFER - OUT REPORT:    Verbal report given to Tristen Jama (name) on [facility]  being transferred to [facility] (unit) for discharge. Report consisted of patients Situation, Background, Assessment and   Recommendations(SBAR). Information from the following report(s) SBAR, Kardex, Intake/Output, MAR, Recent Results and Med Rec Status was reviewed with the receiving nurse. Lines:   Peripheral IV 11/15/19 Right Antecubital (Active)   Site Assessment Clean, dry, & intact 11/20/2019  8:46 AM   Phlebitis Assessment 0 11/20/2019  8:46 AM   Infiltration Assessment 0 11/20/2019  8:46 AM   Dressing Status Clean, dry, & intact 11/20/2019  8:46 AM   Dressing Type Transparent 11/20/2019  8:46 AM   Hub Color/Line Status Capped 11/20/2019  8:46 AM   Action Taken Open ports on tubing capped 11/20/2019  8:46 AM   Alcohol Cap Used Yes 11/20/2019  8:46 AM        Opportunity for questions and clarification was provided.       Patient transported with:  JANAE

## 2019-11-20 NOTE — ROUTINE PROCESS
Bedside and Verbal shift change report given to Sabina Hodgson (oncoming nurse) by Nithin Reynaga RN (offgoing nurse). Report included the following information SBAR, Kardex, Intake/Output, MAR and Recent Results.

## 2019-11-20 NOTE — PROGRESS NOTES
RENAL  PROGRESS NOTE        Subjective:   No complaints  Objective:   VITALS SIGNS:    Visit Vitals  /72   Pulse 98   Temp 98.6 °F (37 °C)   Resp 18   Ht 5' 9\" (1.753 m)   Wt (!) 185.5 kg (408 lb 15.3 oz)   SpO2 96%   Breastfeeding No   BMI 60.39 kg/m²       O2 Device: Room air       Temp (24hrs), Av.1 °F (36.7 °C), Min:97.5 °F (36.4 °C), Max:98.6 °F (37 °C)         PHYSICAL EXAM:  NAD    DATA REVIEW:     INTAKE / OUTPUT:   Last shift:      No intake/output data recorded. Last 3 shifts:  1901 -  0700  In: -   Out: 1150 [Urine:1150]    Intake/Output Summary (Last 24 hours) at 2019 0945  Last data filed at 2019 0045  Gross per 24 hour   Intake    Output 750 ml   Net -750 ml         LABS:   No results for input(s): WBC, HGB, HCT, PLT, HGBEXT, HCTEXT, PLTEXT, HGBEXT, HCTEXT, PLTEXT in the last 72 hours.   Recent Labs     19  0358 19  2040 19  0355 19  0257     --  139 140   K 3.9  --  3.7 3.8     --  102 103   CO2 31  --  32 31   GLU 99  --  100 107*   BUN 32*  --  32* 30*   CREA 1.74*  --  1.79* 1.91*   CA 8.8  --  8.7 8.9   MG  --  2.1 1.8  --    PHOS  --   --  3.3  --    INR 1.6*  --  1.7* 2.0*           Assessment:     CKD stage 3  CRISTINA,hemodynamics in the setting of diuresis  Lymphedema  Volume overload  Obesity      Plan:   Labs better  Good diuresis  Can dc on lasix 80 mg bid;TO SEE  [facility] as OP  Ant Pablo MD

## 2019-11-20 NOTE — PROGRESS NOTES
Pharmacist Discharge Medication Reconciliation    Discharging Provider: Dr. Miryam Longoria [facility]:   Past Medical History:   Diagnosis Date    Arrhythmia     A Fib    Arthritis     Atrial fibrillation ([facility]     Cancer ([facility]     left breast cancer    Cardiomegaly     CKD (chronic kidney disease) stage 3, GFR 30-59 ml/min ([facility])     judith    Clostridium difficile infection     Congestive heart failure, unspecified     COPD     Diarrhea     /constipation    Dyspepsia and other specified disorders of function of stomach     Elevated antinuclear antibody (MARYAM) level     Endocrine disease     hypothyroidism    GERD (gastroesophageal reflux disease)     Hearing loss     Heart failure (HCC)     Hypertension     Hypertensive heart disease with heart failure (HCC)     diastolic dysfunction    Joint pain     Leg swelling     Long term (current) use of anticoagulants     Lymphedema     Morbid obesity ([facility]     Obesity, unspecified     Other ill-defined conditions(799.89)     lymph edema    Pleural effusion     s/p VATS    Shortness of breath     Thyroid disease     Unspecified sleep apnea     Does not use machine     Chief Complaint for this Admission:   Chief Complaint   Patient presents with    Skin Problem     Allergies: Latex; Grass pollen-bermuda, standard; and Sulfa (sulfonamide antibiotics)    Discharge Medications:   Current Discharge Medication List        START taking these medications    Details   venlafaxine-SR (EFFEXOR-XR) 75 mg capsule Take 1 Cap by mouth daily (with breakfast) for 30 days. Qty: 30 Cap, Refills: 0           CONTINUE these medications which have NOT CHANGED    Details   metFORMIN ER (GLUCOPHAGE XR) 500 mg tablet Take 500 mg by mouth daily (with dinner). albuterol (PROVENTIL HFA, VENTOLIN HFA, PROAIR HFA) 90 mcg/actuation inhaler Take 2 Puffs by inhalation every four (4) hours as needed for Wheezing.       furosemide (LASIX) 80 mg tablet Take 1 Tab by mouth two (2) times a day.  Qty: 60 Tab, Refills: 1      carvedilol (COREG) 12.5 mg tablet Take 1 Tab by mouth two (2) times daily (with meals). Qty: 60 Tab, Refills: 1      nystatin (MYCOSTATIN) powder Apply  to affected area three (3) times daily. APPLY TO bilateral groins and under both breasts  Indications: CUTANEOUS CANDIDIASIS  Qty: 1 Bottle, Refills: 0      spironolactone (ALDACTONE) 25 mg tablet Take 1 Tab by mouth daily. Qty: 30 Tab, Refills: 1      !! warfarin (COUMADIN) 2.5 mg tablet Take 2.5 mg by mouth six (6) days a week. Every day except Mondays      guaiFENesin ER (MUCINEX) 600 mg ER tablet Take 600 mg by mouth two (2) times a day. docusate sodium (COLACE) 50 mg capsule Take 50 mg by mouth two (2) times daily as needed for Constipation. levothyroxine (SYNTHROID) 112 mcg tablet Take 1 Tab by mouth Daily (before breakfast). Qty: 30 Tab, Refills: 0      allopurinol (ZYLOPRIM) 100 mg tablet Take 100 mg by mouth daily. POTASSIUM CHLORIDE (KLOR-CON PO) Take 20 mEq by mouth two (2) times a day. FERROUS SULFATE, DRIED (IRON, DRIED, PO) Take 65 mg by mouth two (2) times a day. LACTOBACILLUS RHAMNOSUS GG (PROBIOTIC PO) Take 1 Cap by mouth daily. 15 billion       loratadine (CLARITIN) 10 mg tablet Take 10 mg by mouth daily. gabapentin (NEURONTIN) 400 mg capsule Take 400 mg by mouth nightly. pantoprazole (PROTONIX) 40 mg tablet Take 40 mg by mouth daily. !! warfarin (COUMADIN) 5 mg tablet Take 5 mg by mouth every Monday. Mondays only       !! - Potential duplicate medications found. Please discuss with provider.         The patient's chart, MAR and AVS were reviewed by Lester Greenfield PHARMD.

## 2019-11-20 NOTE — PROGRESS NOTES
Problem: Falls - Risk of  Goal: *Absence of Falls  Description  Document Noam Valle Fall Risk and appropriate interventions in the flowsheet. Outcome: Resolved/Met  Note: Fall Risk Interventions:            Medication Interventions: Patient to call before getting OOB    Elimination Interventions: Call light in reach              Problem: Patient Education: Go to Patient Education Activity  Goal: Patient/Family Education  Outcome: Resolved/Met     Problem: Pressure Injury - Risk of  Goal: *Prevention of pressure injury  Description  Document Devon Scale and appropriate interventions in the flowsheet. Outcome: Resolved/Met  Note: Pressure Injury Interventions:  Sensory Interventions: Discuss PT/OT consult with provider, Assess changes in LOC, Assess need for specialty bed, Float heels, Turn and reposition approx. every two hours (pillows and wedges if needed)    Moisture Interventions: Check for incontinence Q2 hours and as needed, Apply protective barrier, creams and emollients, Absorbent underpads, Internal/External urinary devices, Offer toileting Q_hr    Activity Interventions: Increase time out of bed, PT/OT evaluation    Mobility Interventions: HOB 30 degrees or less, PT/OT evaluation, Turn and reposition approx.  every two hours(pillow and wedges), Pressure redistribution bed/mattress (bed type)    Nutrition Interventions: Document food/fluid/supplement intake    Friction and Shear Interventions: Foam dressings/transparent film/skin sealants, HOB 30 degrees or less, Lift team/patient mobility team                Problem: Patient Education: Go to Patient Education Activity  Goal: Patient/Family Education  Outcome: Resolved/Met     Problem: Patient Education: Go to Patient Education Activity  Goal: Patient/Family Education  Outcome: Resolved/Met     Problem: Patient Education: Go to Patient Education Activity  Goal: Patient/Family Education  Outcome: Resolved/Met

## 2019-11-20 NOTE — DISCHARGE INSTRUCTIONS
Discharge Summary       PATIENT ID: Ingrid Sagastume  MRN: [id number]   YOB: 1940    DATE OF ADMISSION: 11/15/2019  1:57 PM    DATE OF DISCHARGE: 11/20/2019   PRIMARY CARE PROVIDER: Mikala Stauffer MD     DISCHARGING PROVIDER: Angelica Dupont MD    To contact this individual call [phone] and ask the  to page. If unavailable ask to be transferred the Adult Hospitalist Department. CONSULTATIONS: IP CONSULT TO HOSPITALIST  IP CONSULT TO CARDIOLOGY  IP CONSULT TO CARDIOLOGY  IP CONSULT TO NEPHROLOGY    PROCEDURES/SURGERIES: * No surgery found *    ADMITTING [facility] COURSE:   Acute on Chronic diastolic CHF   Shortness of breath  Lower extremity edema  Renal dysfunction on CKD stage III  Bilateral lymphedema      Karen Elizabeth is a 67 y. o. female who presents with increasing leg swelling, sob      67 y.o. female with past medical history significant for obesity, lymphedema, elevated antinuclear antibody level, pleural effusion,  HTN, a-fib,  hearing loss, CHF, leg swelling, COPD, gout,  CKD, hypothyroidism, GERD, sleep apnea,morbid obesity, and breast cancer who presents from home via EMS with chief complaint of leg swelling and sob. Patient presents to ED with worsening SOB and lymphedema. Patient states her leg swelling is increasing and now has onset of blisters to both her legs bilaterally. Patient notes she has an old laceration to her right shin with old stiches still in place. Patient states due to her lymphedema she is unable to ambulate without heavy assistance. Patient reports she has stopped taking her Lasix because of her lack of mobility to use the bathroom when she takes Lasix. Patient notes she was previously seen in the hospital for extreme lymphedema, when she weighed over 400 lbs and was brought down to 300 lbs after fluid removal, while stating she is back to over 400 lbs today.  Patient also complains of becoming easily SOB when she is stressed or overactive. Patient states her SOB has become increasingly prevalent over the past month. Patient affirms she still takes coumadin. Patient affirms SOB, leg swelling, weakness, and wounds. Patient denies fever and chills. There are no other acute medical concerns at this time. Patient was admitted with shortness of breath secondary to acute on chronic diastolic congestive heart failure and fluid overload. She was initially diuresed with Bumex IV however this was, then held after her creatinine increased. She has had about 6 pounds weight loss over her stay. Creatinine stabilized after holding IV diuresis x1 day. Nephrology and cardiology were both consulted and following. Patient will be discharged on Lasix 80 mg twice daily to continue at home. Patient was noncompliant with her Lasix for the last few months, because she was having a lot of difficulty with mobility and getting up to the bathroom. PT OT was consulted and recommended SNF placement given difficulty with mobility. Patient is planned to continue with therapy as a bridge to get home. She has been consulted on the importance of continuing to take Lasix even if this does require her to get up out of bed more often than she would like. Patient is stable for discharge. DISCHARGE DIAGNOSES / PLAN:      SOB/Pedal edema - slowly improving.  -sec to Acute on chronic BV diastolic congestive heart failure as well as lymphedema:   -fluid overload due to her stopping her home diuretics (pt had difficulty getting up to the bathroom and decided to stop them to avoid unnecessary trips)  -Echo EF 45%, global hypokinesis  -Appreciate Cardiology  -Diuresis on hold due to CRISTINA  - Nephrology following as well. Will need recs for final diuretic regimen for SNF.     Severe tricuspid valve regurgitation - stable, per echo. Cardiology following, diurese as able.      Renal dysfunction on CKD stage 3  -in the setting of IV diuresis  -Appreciate Nephrology  -Will need close outpatient follow up with Dr. Toby Bennett  - I and O, avoid nephrotoxins.     Chronic bilateral lymphedema with wound:   -wound care consult, would benefit from o/p [facility] clinic as well      Chronic afib:   -continue coumadin. Rate controlled   -Appreciate Cardiology     Reported medication non compliance  -Counseled     Morbid obese  -Nutrition input     GIANNA  -does not use CPAP          ADDITIONAL CARE RECOMMENDATIONS:   1. Please take all medications as prescribed. Note changes as below.   -Resume prior home Lasix dose 80 mg twice daily  -Continue home dose venlafaxine. 2. Please make sure to follow up with your primary care physician within 1-2 weeks of discharge for hospital follow up you also need to follow-up with, Dr. Toby Bnenett, as well as her cardiologist  3. Continue work with PT OT and getting stronger with the goal of getting home. 4.  It is very important for you to take your Lasix even if this requires you to get up out of bed more often would like. 5.  Continue to weigh yourself daily call your cardiologist or nephrologist if you notice any large fluctuations in weight 1 to 2 pounds within 24 hours.           PENDING TEST RESULTS:   At the time of discharge the following test results are still pending: None    FOLLOW UP APPOINTMENTS:    Follow-up Information     Follow up With Specialties Details Why Contact Info    [address] [facility],[address] Floor of [address] [place] [address] Drive [address] [facility]    Crista Lin MD Family Practice In 1 week  [address] [facility]  [phone]      Cm Hedrick MD Nephrology In 2 weeks call to make a follow up prema Pappas [address] [facility] [address]  [address] [address] [phone]               DIET: Cardiac Diet    ACTIVITY: PT/OT Eval and Treat    WOUND CARE: None    EQUIPMENT needed: Per PT/OT      DISCHARGE MEDICATIONS:  Current Discharge Medication List      START taking these medications    Details   venlafaxine-SR (EFFEXOR-XR) 75 mg capsule Take 1 Cap by mouth daily (with breakfast) for 30 days. Qty: 30 Cap, Refills: 0         CONTINUE these medications which have NOT CHANGED    Details   metFORMIN ER (GLUCOPHAGE XR) 500 mg tablet Take 500 mg by mouth daily (with dinner). albuterol (PROVENTIL HFA, VENTOLIN HFA, PROAIR HFA) 90 mcg/actuation inhaler Take 2 Puffs by inhalation every four (4) hours as needed for Wheezing. furosemide (LASIX) 80 mg tablet Take 1 Tab by mouth two (2) times a day. Qty: 60 Tab, Refills: 1      carvedilol (COREG) 12.5 mg tablet Take 1 Tab by mouth two (2) times daily (with meals). Qty: 60 Tab, Refills: 1      nystatin (MYCOSTATIN) powder Apply  to affected area three (3) times daily. APPLY TO bilateral groins and under both breasts  Indications: CUTANEOUS CANDIDIASIS  Qty: 1 Bottle, Refills: 0      spironolactone (ALDACTONE) 25 mg tablet Take 1 Tab by mouth daily. Qty: 30 Tab, Refills: 1      !! warfarin (COUMADIN) 2.5 mg tablet Take 2.5 mg by mouth six (6) days a week. Every day except Mondays      guaiFENesin ER (MUCINEX) 600 mg ER tablet Take 600 mg by mouth two (2) times a day. docusate sodium (COLACE) 50 mg capsule Take 50 mg by mouth two (2) times daily as needed for Constipation. levothyroxine (SYNTHROID) 112 mcg tablet Take 1 Tab by mouth Daily (before breakfast). Qty: 30 Tab, Refills: 0      allopurinol (ZYLOPRIM) 100 mg tablet Take 100 mg by mouth daily. FERROUS SULFATE, DRIED (IRON, DRIED, PO) Take 65 mg by mouth two (2) times a day. LACTOBACILLUS RHAMNOSUS GG (PROBIOTIC PO) Take 1 Cap by mouth daily. 15 billion       loratadine (CLARITIN) 10 mg tablet Take 10 mg by mouth daily. gabapentin (NEURONTIN) 400 mg capsule Take 400 mg by mouth nightly. pantoprazole (PROTONIX) 40 mg tablet Take 40 mg by mouth daily.       !! warfarin (COUMADIN) 5 mg tablet Take 5 mg by mouth every Monday. Mondays only       !! - Potential duplicate medications found. Please discuss with provider. STOP taking these medications       POTASSIUM CHLORIDE (KLOR-CON PO) Comments:   Reason for Stopping:                 NOTIFY YOUR PHYSICIAN FOR ANY OF THE FOLLOWING:   Fever over 101 degrees for 24 hours. Chest pain, shortness of breath, fever, chills, nausea, vomiting, diarrhea, change in mentation, falling, weakness, bleeding. Severe pain or pain not relieved by medications. Or, any other signs or symptoms that you may have questions about.     DISPOSITION:    Home With:   OT  PT  HH  RN      X Long term SNF/Inpatient Rehab    Independent/assisted living    Hospice    Other:       PATIENT CONDITION AT DISCHARGE:     Functional status    Poor    X Deconditioned     Independent      Cognition    X Lucid     Forgetful     Dementia      Catheters/lines (plus indication)    Godinez     PICC     PEG    X None      Code status    X Full code     DNR      PHYSICAL EXAMINATION AT DISCHARGE:  Visit Vitals  /72   Pulse 98   Temp 98.6 °F (37 °C)   Resp 18   Ht 5' 9\" (1.753 m)   Wt (!) 185.5 kg (408 lb 15.3 oz)   SpO2 96%   Breastfeeding No   BMI 60.39 kg/m²     Gen: NAD, awake in bed  HEENT: NC/AT, sclera anicteric, PERRL, EOMI  CV: RRR no m/r/g  Resp: CTA b/l no increased work of breathing, on RA, no increased WOB  Abd: NT/ND, normal bowel sounds  Ext: 2+ pulses,3+ edema, lymphedema   Skin: No rashes      CHRONIC MEDICAL DIAGNOSES:  Problem List as of 11/20/2019 Date Reviewed: 11/16/2019          Codes Class Noted - Resolved    * (Principal) Hypervolemia ICD-10-CM: E87.70  ICD-9-CM: 276.69  11/15/2019 - Present        Bilateral leg edema ICD-10-CM: R60.0  ICD-9-CM: 782.3  11/15/2019 - Present        Acute on chronic diastolic heart failure ([facility] ICD-10-CM: I50.33  ICD-9-CM: 428.33  10/6/2017 - Present        A-fib ([facility] ICD-10-CM: I48.91  ICD-9-CM: 427.31  12/2/2016 - Present        Physical deconditioning ICD-10-CM: R53.81  ICD-9-CM: 799.3  3/19/2014 - Present        Breast CA ([address] ICD-10-CM: C50.919  ICD-9-CM: 174.9  7/15/2013 - Present        Recurrent epistaxis ICD-10-CM: R04.0  ICD-9-CM: 784.7  10/1/2012 - Present        Diastolic CHF, chronic (HCC) ICD-10-CM: I50.32  ICD-9-CM: 428.32, 428.0  10/1/2012 - Present        Atrial fibrillation (HCC) ICD-10-CM: I48.91  ICD-9-CM: 427.31  10/1/2012 - Present        CKD (chronic kidney disease) stage 3, GFR 30-59 ml/min (HCC) ICD-10-CM: N18.3  ICD-9-CM: 585.3  10/1/2012 - Present        Hypovolemia ICD-10-CM: E86.1  ICD-9-CM: 276.52  10/1/2012 - Present        Hypokalemia ICD-10-CM: E87.6  ICD-9-CM: 276.8  10/1/2012 - Present        History of complete heart block ICD-10-CM: Z86.79  ICD-9-CM: V12.59  10/1/2012 - Present        History of Clostridium difficile infection ICD-10-CM: Z86.19  ICD-9-CM: V12.09  10/1/2012 - Present        Diastolic CHF, acute on chronic (HCC) ICD-10-CM: I50.33  ICD-9-CM: 428.33, 428.0  5/4/2012 - Present        Hypokalemia ICD-10-CM: E87.6  ICD-9-CM: 276.8  5/4/2012 - Present        CKD (chronic kidney disease) stage 3, GFR 30-59 ml/min (HCC) (Chronic) ICD-10-CM: N18.3  ICD-9-CM: 585.3  5/4/2012 - Present        Sepsis(995.91) ICD-10-CM: A41.9  ICD-9-CM: 995.91  5/3/2012 - Present        Intestinal infection due to Clostridium difficile ICD-10-CM: A04.72  ICD-9-CM: 008.45  5/3/2012 - Present        Complete heart block ([facility] ICD-10-CM: I44.2  ICD-9-CM: 426.0  4/5/2012 - Present        ARF (acute renal failure) ([facility] ICD-10-CM: N17.9  ICD-9-CM: 584.9  3/25/2012 - Present        Diarrhea ICD-10-CM: R19.7  ICD-9-CM: 787.91  3/25/2012 - Present        Anemia ICD-10-CM: D64.9  ICD-9-CM: 285.9  3/19/2012 - Present        Acute on chronic renal failure (HCC) ICD-10-CM: N17.9, N18.9  ICD-9-CM: 584.9, 585.9  3/16/2012 - Present        Unspecified hypothyroidism ICD-10-CM: E03.9  ICD-9-CM: 244.9  3/16/2012 - Present        Chronic airway obstruction, not elsewhere classified ICD-10-CM: J44.9  ICD-9-CM: 437  3/16/2012 - Present        Hypertensive heart disease with heart failure (HCC) ICD-10-CM: I11.0  ICD-9-CM: 402.91, 428.9  Unknown - Present    Overview Signed 1/1/2011  7:37 PM by Brain MD Brionna     diastolic dysfunction             Pleural effusion ICD-10-CM: J90  ICD-9-CM: 511.9  Unknown - Present    Overview Signed 1/1/2011  7:37 PM by Frandy Staton MD     s/p VATS             Elevated antinuclear antibody (MARYAM) level ICD-10-CM: R76.8  ICD-9-CM: 795.79  Unknown - Present        Other dyspnea and respiratory abnormality ICD-10-CM: R06.09, R09.89  ICD-9-CM: 786.09  Unknown - Present        Other lymphedema ICD-10-CM: I89.0  ICD-9-CM: 457.1  Unknown - Present        Morbid obesity with BMI of 50.0-59.9, adult ([facility] ICD-10-CM: E66.01, Z68.43  ICD-9-CM: 278.01, V85.43  Unknown - Present        Lymphedema (Chronic) ICD-10-CM: I89.0  ICD-9-CM: 457.1  Unknown - Present        Atrial fibrillation ([facility] (Chronic) ICD-10-CM: I48.91  ICD-9-CM: 427.31  Unknown - Present            Signed:   Yuki Chaney MD  11/20/2019  10:29 AM

## 2019-11-20 NOTE — PROGRESS NOTES
Transition of Care Plan to SNF/Rehab    SNF/Rehab Transition:  Patient has been accepted to [facility] and meets criteria for admission. Patient will transported by [facility] and expected to leave at 2pm.    Communication to Patient/Family:  Met with patient and  (identified care giver) and they are agreeable to the transition plan. Communication to SNF/Rehab:  Bedside RN, Charisse Win, has been notified to update the transition plan to the facility and call report (phone number [phone]). Discharge information has been updated on the AVS.     Discharge instructions to be fax'd to facility at [facility] # [phone]). SNF/Rehab Transition:  Patient to follow-up with Home Health: [facility])  PCP/Specialist: Marsha Villafana  Ambulatory Care Management:    Reviewed and confirmed with facility, Fay they can manage the patient care needs for the following:     Pamela Ocasio with (X) only those applicable:    Medication:  [x]  Medications will be available at the facility  []  IV Antibiotics  []  Controlled Substance - hard copy to be sent with patient   []  Weekly Labs   Documents:  [x] Hard RX  [x] MAR  [x] Kardex  [x] AVS  [x]Transfer Summary  [x]Discharge   Equipment:  []  CPAP/BiPAP  []  Wound Vacuum  []  Godinez or Urinary Device  []  PICC/Central Line  []  Nebulizer  []  Ventilator   Treatment:  []Isolation (for MRSA, VRE, etc.)  []Surgical Drain Management  []Tracheostomy Care  []Dressing Changes  []Dialysis with transportation and chair time  []PEG Care  []Oxygen  []Daily Weights for Heart Failure   Dietary:  []Any diet limitations  []Tube Feedings   []Total Parenteral Management (TPN)   Eligible for Medicaid Long Term Services and Supports  Yes:  [] Eligible for medical assistance or will become eligible within 180 days and UAI completed. [] Provider/Patient and/or support system has requested screening.   [] UAI copy provided to patient or responsible party,  [] UAI unavailable at discharge will send once processed to SNF provider. [] UAI unavailable at discharged mailed to patient  No:   [] Private pay and is not financially eligible for Medicaid within the next 180 days. [] Reside out-of-state. [] A residents of a state owned/operated facility that is licensed  by [address] [place] [facility] or [facility]  [] Enrollment in [facility] hospice services  [] [facility]  [] Patient /Family declines to have screening completed or provide financial information for screening     Financial Resources:  Medicaid    [] Initiated and application pending   [] Full coverage     Advanced Care Plan:  []Surrogate Decision Maker of Care  []POA  [x]Communicated Code StatusFull   Other    Medicaid screening done by [facility] on 11/12/19.     See CM note

## 2019-11-20 NOTE — PROGRESS NOTES
Bedside shift change report given to [facility], RN (oncoming nurse) by Anna RN (offgoing nurse). Report included the following information SBAR, Kardex, Intake/Output, MAR and Recent Results.

## 2019-11-20 NOTE — DISCHARGE SUMMARY
Discharge Summary       PATIENT ID: Jaymie Lay  MRN: [id number]   YOB: 1940    DATE OF ADMISSION: 11/15/2019  1:57 PM    DATE OF DISCHARGE: 11/20/2019   PRIMARY CARE PROVIDER: Yajaira Olivares MD     DISCHARGING PROVIDER: Ishan Gamble MD    To contact this individual call [phone] and ask the  to page. If unavailable ask to be transferred the Adult Hospitalist Department. CONSULTATIONS: IP CONSULT TO HOSPITALIST  IP CONSULT TO CARDIOLOGY  IP CONSULT TO CARDIOLOGY  IP CONSULT TO NEPHROLOGY    PROCEDURES/SURGERIES: * No surgery found *    ADMITTING [address] [place] [address] COURSE:   Acute on Chronic diastolic CHF   Shortness of breath  Lower extremity edema  Renal dysfunction on CKD stage III  Bilateral lymphedema      Yuridia Neighbor Glenn is a 67 y. o. female who presents with increasing leg swelling, sob      67 y.o. female with past medical history significant for obesity, lymphedema, elevated antinuclear antibody level, pleural effusion,  HTN, a-fib,  hearing loss, CHF, leg swelling, COPD, gout,  CKD, hypothyroidism, GERD, sleep apnea,morbid obesity, and breast cancer who presents from home via EMS with chief complaint of leg swelling and sob. Patient presents to ED with worsening SOB and lymphedema. Patient states her leg swelling is increasing and now has onset of blisters to both her legs bilaterally. Patient notes she has an old laceration to her right shin with old stiches still in place. Patient states due to her lymphedema she is unable to ambulate without heavy assistance. Patient reports she has stopped taking her Lasix because of her lack of mobility to use the bathroom when she takes Lasix. Patient notes she was previously seen in the hospital for extreme lymphedema, when she weighed over 400 lbs and was brought down to 300 lbs after fluid removal, while stating she is back to over 400 lbs today.  Patient also complains of becoming easily SOB when she is stressed or overactive. Patient states her SOB has become increasingly prevalent over the past month. Patient affirms she still takes coumadin. Patient affirms SOB, leg swelling, weakness, and wounds. Patient denies fever and chills. There are no other acute medical concerns at this time. Patient was admitted with shortness of breath secondary to acute on chronic diastolic congestive heart failure and fluid overload. She was initially diuresed with Bumex IV however this was, then held after her creatinine increased. She has had about 6 pounds weight loss over her stay. Creatinine stabilized after holding IV diuresis x1 day. Nephrology and cardiology were both consulted and following. Patient will be discharged on Lasix 80 mg twice daily to continue at home. Patient was noncompliant with her Lasix for the last few months, because she was having a lot of difficulty with mobility and getting up to the bathroom. PT OT was consulted and recommended SNF placement given difficulty with mobility. Patient is planned to continue with therapy as a bridge to get home. She has been consulted on the importance of continuing to take Lasix even if this does require her to get up out of bed more often than she would like. Patient is stable for discharge. DISCHARGE DIAGNOSES / PLAN:      SOB/Pedal edema - slowly improving. NYHA class III on admission, class II on discharge  -sec to Acute on chronic BV diastolic congestive heart failure as well as lymphedema:   -fluid overload due to her stopping her home diuretics (pt had difficulty getting up to the bathroom and decided to stop them to avoid unnecessary trips)  -Echo EF 45%, global hypokinesis  -Appreciate Cardiology  -Diuresis on hold due to CRISTINA  - Nephrology following as well. Will need recs for final diuretic regimen for SNF.     Severe tricuspid valve regurgitation - stable, per echo. Cardiology following, diurese as able.      Renal dysfunction on CKD stage 3  -in the setting of IV diuresis  -Appreciate Nephrology  -Will need close outpatient follow up with Dr. Houston Anglin  - I and O, avoid nephrotoxins.     Chronic bilateral lymphedema with wound:   -wound care consult, would benefit from o/p [facility] as well      Chronic afib:   -continue coumadin. Rate controlled   -Appreciate Cardiology     Reported medication non compliance  -Counseled     Morbid obese  -Nutrition input     GIANNA  -does not use CPAP          ADDITIONAL CARE RECOMMENDATIONS:   1. Please take all medications as prescribed. Note changes as below.   -Resume prior home Lasix dose 80 mg twice daily  -Continue home dose venlafaxine. 2. Please make sure to follow up with your primary care physician within 1-2 weeks of discharge for hospital follow up you also need to follow-up with, Dr. Houston Anglin, as well as her cardiologist  3. Continue work with PT OT and getting stronger with the goal of getting home. 4.  It is very important for you to take your Lasix even if this requires you to get up out of bed more often would like. 5.  Continue to weigh yourself daily call your cardiologist or nephrologist if you notice any large fluctuations in weight 1 to 2 pounds within 24 hours.           PENDING TEST RESULTS:   At the time of discharge the following test results are still pending: None    FOLLOW UP APPOINTMENTS:    Follow-up Information     Follow up With Specialties Details Why Contact Info    [facility],5Th Floor of [address] [place] [address] Drive [address] [facility]    George Durant MD Family Practice In 1 week  [address] [facility]  [phone]      Henry Espinoza MD Nephrology In 2 weeks call to make a follow up prema Pappas [address] [facility] [address]  [address] [address] [phone]               DIET: Cardiac Diet    ACTIVITY: PT/OT Eval and Treat    WOUND CARE: None    EQUIPMENT needed: Per PT/OT      DISCHARGE MEDICATIONS:  Current Discharge Medication List      START taking these medications    Details   venlafaxine-SR (EFFEXOR-XR) 75 mg capsule Take 1 Cap by mouth daily (with breakfast) for 30 days. Qty: 30 Cap, Refills: 0         CONTINUE these medications which have NOT CHANGED    Details   metFORMIN ER (GLUCOPHAGE XR) 500 mg tablet Take 500 mg by mouth daily (with dinner). albuterol (PROVENTIL HFA, VENTOLIN HFA, PROAIR HFA) 90 mcg/actuation inhaler Take 2 Puffs by inhalation every four (4) hours as needed for Wheezing. furosemide (LASIX) 80 mg tablet Take 1 Tab by mouth two (2) times a day. Qty: 60 Tab, Refills: 1      carvedilol (COREG) 12.5 mg tablet Take 1 Tab by mouth two (2) times daily (with meals). Qty: 60 Tab, Refills: 1      nystatin (MYCOSTATIN) powder Apply  to affected area three (3) times daily. APPLY TO bilateral groins and under both breasts  Indications: CUTANEOUS CANDIDIASIS  Qty: 1 Bottle, Refills: 0      spironolactone (ALDACTONE) 25 mg tablet Take 1 Tab by mouth daily. Qty: 30 Tab, Refills: 1      !! warfarin (COUMADIN) 2.5 mg tablet Take 2.5 mg by mouth six (6) days a week. Every day except Mondays      guaiFENesin ER (MUCINEX) 600 mg ER tablet Take 600 mg by mouth two (2) times a day. docusate sodium (COLACE) 50 mg capsule Take 50 mg by mouth two (2) times daily as needed for Constipation. levothyroxine (SYNTHROID) 112 mcg tablet Take 1 Tab by mouth Daily (before breakfast). Qty: 30 Tab, Refills: 0      allopurinol (ZYLOPRIM) 100 mg tablet Take 100 mg by mouth daily. POTASSIUM CHLORIDE (KLOR-CON PO) Take 20 mEq by mouth two (2) times a day. FERROUS SULFATE, DRIED (IRON, DRIED, PO) Take 65 mg by mouth two (2) times a day. LACTOBACILLUS RHAMNOSUS GG (PROBIOTIC PO) Take 1 Cap by mouth daily. 15 billion       loratadine (CLARITIN) 10 mg tablet Take 10 mg by mouth daily.         gabapentin (NEURONTIN) 400 mg capsule Take 400 mg by mouth nightly. pantoprazole (PROTONIX) 40 mg tablet Take 40 mg by mouth daily. !! warfarin (COUMADIN) 5 mg tablet Take 5 mg by mouth every Monday. Mondays only       !! - Potential duplicate medications found. Please discuss with provider. NOTIFY YOUR PHYSICIAN FOR ANY OF THE FOLLOWING:   Fever over 101 degrees for 24 hours. Chest pain, shortness of breath, fever, chills, nausea, vomiting, diarrhea, change in mentation, falling, weakness, bleeding. Severe pain or pain not relieved by medications. Or, any other signs or symptoms that you may have questions about.     DISPOSITION:    Home With:   OT  PT  HH  RN      X Long term SNF/Inpatient Rehab    Independent/assisted living    Hospice    Other:       PATIENT CONDITION AT DISCHARGE:     Functional status    Poor    X Deconditioned     Independent      Cognition    X Lucid     Forgetful     Dementia      Catheters/lines (plus indication)    Godinez     PICC     PEG    X None      Code status    X Full code     DNR      PHYSICAL EXAMINATION AT DISCHARGE:  Visit Vitals  /72   Pulse 98   Temp 98.6 °F (37 °C)   Resp 18   Ht 5' 9\" (1.753 m)   Wt (!) 185.5 kg (408 lb 15.3 oz)   SpO2 96%   Breastfeeding No   BMI 60.39 kg/m²     Gen: NAD, awake in bed  HEENT: NC/AT, sclera anicteric, PERRL, EOMI  CV: RRR no m/r/g  Resp: CTA b/l no increased work of breathing, on RA, no increased WOB  Abd: NT/ND, normal bowel sounds  Ext: 2+ pulses,3+ edema, lymphedema   Skin: No rashes      CHRONIC MEDICAL DIAGNOSES:  Problem List as of 11/20/2019 Date Reviewed: 11/16/2019          Codes Class Noted - Resolved    * (Principal) Hypervolemia ICD-10-CM: E87.70  ICD-9-CM: 276.69  11/15/2019 - Present        Bilateral leg edema ICD-10-CM: R60.0  ICD-9-CM: 782.3  11/15/2019 - Present        Acute on chronic diastolic heart failure ([facility] ICD-10-CM: I50.33  ICD-9-CM: 428.33  10/6/2017 - Present        A-fib ([facility] ICD-10-CM: I48.91  ICD-9-CM: 427.31  12/2/2016 - Present Physical deconditioning ICD-10-CM: R53.81  ICD-9-CM: 799.3  3/19/2014 - Present        Breast CA ([address] ICD-10-CM: C50.919  ICD-9-CM: 174.9  7/15/2013 - Present        Recurrent epistaxis ICD-10-CM: R04.0  ICD-9-CM: 784.7  10/1/2012 - Present        Diastolic CHF, chronic (HCC) ICD-10-CM: I50.32  ICD-9-CM: 428.32, 428.0  10/1/2012 - Present        Atrial fibrillation (HCC) ICD-10-CM: I48.91  ICD-9-CM: 427.31  10/1/2012 - Present        CKD (chronic kidney disease) stage 3, GFR 30-59 ml/min (HCC) ICD-10-CM: N18.3  ICD-9-CM: 585.3  10/1/2012 - Present        Hypovolemia ICD-10-CM: E86.1  ICD-9-CM: 276.52  10/1/2012 - Present        Hypokalemia ICD-10-CM: E87.6  ICD-9-CM: 276.8  10/1/2012 - Present        History of complete heart block ICD-10-CM: Z86.79  ICD-9-CM: V12.59  10/1/2012 - Present        History of Clostridium difficile infection ICD-10-CM: Z86.19  ICD-9-CM: V12.09  10/1/2012 - Present        Diastolic CHF, acute on chronic (HCC) ICD-10-CM: I50.33  ICD-9-CM: 428.33, 428.0  5/4/2012 - Present        Hypokalemia ICD-10-CM: E87.6  ICD-9-CM: 276.8  5/4/2012 - Present        CKD (chronic kidney disease) stage 3, GFR 30-59 ml/min (HCC) (Chronic) ICD-10-CM: N18.3  ICD-9-CM: 585.3  5/4/2012 - Present        Sepsis(995.91) ICD-10-CM: A41.9  ICD-9-CM: 995.91  5/3/2012 - Present        Intestinal infection due to Clostridium difficile ICD-10-CM: A04.72  ICD-9-CM: 008.45  5/3/2012 - Present        Complete heart block ([facility] ICD-10-CM: I44.2  ICD-9-CM: 426.0  4/5/2012 - Present        ARF (acute renal failure) ([facility] ICD-10-CM: N17.9  ICD-9-CM: 584.9  3/25/2012 - Present        Diarrhea ICD-10-CM: R19.7  ICD-9-CM: 787.91  3/25/2012 - Present        Anemia ICD-10-CM: D64.9  ICD-9-CM: 285.9  3/19/2012 - Present        Acute on chronic renal failure (HCC) ICD-10-CM: N17.9, N18.9  ICD-9-CM: 584.9, 585.9  3/16/2012 - Present        Unspecified hypothyroidism ICD-10-CM: E03.9  ICD-9-CM: 244.9  3/16/2012 - Present        Chronic airway obstruction, not elsewhere classified ICD-10-CM: J44.9  ICD-9-CM: 290  3/16/2012 - Present        Hypertensive heart disease with heart failure (HCC) ICD-10-CM: I11.0  ICD-9-CM: 402.91, 428.9  Unknown - Present    Overview Signed 1/1/2011  7:37 PM by Raymond Lipscomb MD     diastolic dysfunction             Pleural effusion ICD-10-CM: J90  ICD-9-CM: 511.9  Unknown - Present    Overview Signed 1/1/2011  7:37 PM by Raymond Lipscomb MD     s/p VATS             Elevated antinuclear antibody (MARYAM) level ICD-10-CM: R76.8  ICD-9-CM: 795.79  Unknown - Present        Other dyspnea and respiratory abnormality ICD-10-CM: R06.09, R09.89  ICD-9-CM: 786.09  Unknown - Present        Other lymphedema ICD-10-CM: I89.0  ICD-9-CM: 457.1  Unknown - Present        Morbid obesity with BMI of 50.0-59.9, adult ([facility] ICD-10-CM: E66.01, Z68.43  ICD-9-CM: 278.01, V85.43  Unknown - Present        Lymphedema (Chronic) ICD-10-CM: I89.0  ICD-9-CM: 457.1  Unknown - Present        Atrial fibrillation ([facility] (Chronic) ICD-10-CM: I48.91  ICD-9-CM: 427.31  Unknown - Present              Greater than 30 minutes were spent with the patient on counseling and coordination of care    Signed:   Neftali Garcia MD  11/20/2019  10:29 AM

## 2019-11-21 ENCOUNTER — PATIENT OUTREACH (OUTPATIENT)
Dept: CASE MANAGEMENT | Age: 79
End: 2019-11-21

## 2019-11-21 NOTE — PROGRESS NOTES
Patient discharged to a SNF [facility], [facility], [address] (Medicare [facility]. Patient admitted 11/20/2019 room 411.

## 2019-11-22 ENCOUNTER — PATIENT OUTREACH (OUTPATIENT)
Dept: CASE MANAGEMENT | Age: 79
End: 2019-11-22

## 2019-11-22 NOTE — PROGRESS NOTES
Community Care Team documentation for patient in [facility]  Initial Follow Up       Patient was discharged to Pending sale to [facility]. Information included in this progress note has been provided to SNF. Hospital Admission and Diagnosis: [facility] 11/15-11/20 SOB/Pedal edema       RRAT Score: 20       Advance Care Planning:  Not on file      PCP : Cash Eller MD    SNF Attending:  Vahid Foy MD    Spoke with SNF team. LOS TBD. PT and OT, not walking at baseline. Currently, transfers dependent, upper min, lower max. Barrier: Limited motivation. Plan to return home with . Pt was open to At [facility] P[facility]. Per chart, [facility] submitted. Possible personal care needed upon SNF DC. Hosp DC wt 408 lbs 15.3 oz on 11/19. SNF weight 12/21 408lbs. Community Care Team will follow up weekly with Jori Veto until discharge. Medications were not reconciled and general patient assessment was not completed during this [facility] outreach.

## 2019-11-26 NOTE — CDMP QUERY
· Pt admitted with Acute on Chronic diastolic CHF in setting of HTN, and CKD stage 3. After further study, please clarify & document the etiology of CHF, if able to be determined as any of the following: 
 
-CHF d/t right heart failure 
-CHF d/t Hypertensive Heart & Kidney Disease  
-CHF d/t both right heart failure & Hypertensive Heart & Kidney Disease  
-CHF d/t other explanation, please specify  
-Clinically unable to be determine The medical record reflects the following: 
  
Risk Factors:  History of Afib, HTN, chronic diastolic CHF & admit for Acute on Chronic diastolic CHF Clinical Indicators: 
· 11/17-Per cardiology PN-The patient had a previous history of left ventricular ejection fraction 45% on 2D echocardiogram October 2017. There was report of moderate mitral valve regurgitation and aortic valve regurgitation but mild tricuspid regurgitation. 2D echocardiogram shows severe tricuspid valve regurgitation. LVEF 45%, mild to moderate MR, moderate RV dysfunction · 11/17-Per PN-Echo EF 45%, global hypokinesis, severe tricuspid valve regurgitation 11/18-Per cardiology-2D echocardiogram this admission shows severe tricuspid valve regurgitation. LVEF 45%, mild to moderate MR, moderate RV dysfunction Treatment: IV Lasix, ECHO, PO aldactone, PO Coreg, cardiac monitoring,  EKG,  monitor I&Os, daily wts, BNP levels, pulse ox continuous, CXR Thank you, Scar GIRALDON, RN 
[facility]  
[phone]

## 2019-12-06 ENCOUNTER — PATIENT OUTREACH (OUTPATIENT)
Dept: CASE MANAGEMENT | Age: 79
End: 2019-12-06

## 2019-12-09 NOTE — PROGRESS NOTES
Community Care Team Documentation for Patient in [facility]  Subsequent Follow up     Patient remains at [facility] ([facility]). See previous [facility] Team notes. Spoke with SNF team. LOS 1wk, not walking, transfers max x2, upper supervision to min A, lower max A to total. Wt 186.9 on 12/5. Discharge planned for 12/13 home with spouse using [facility]. DME hospital bed, sit to stand lift ordered. Medications were not reconciled and general patient assessment was not completed during this skilled nursing facility outreach. Pearl Trejo, BSN, [address] Team  [phone]

## 2019-12-12 ENCOUNTER — PATIENT OUTREACH (OUTPATIENT)
Dept: CASE MANAGEMENT | Age: 79
End: 2019-12-12

## 2019-12-12 NOTE — PROGRESS NOTES
Community Care Team Documentation for Patient in [facility]  Subsequent Follow up     Patient remains at [facility] ([facility]). See previous [facility] Team notes. Spoke with SNF team. [facility] appeal started 12/11/19 - pt won appeal. PT/OT continue. currently not walking, transfers STS and lift with max x 2, upper supervision, lower max to total, weight 12/11/19 387. Plan to discharge home with spouse and At [facility]. DC date pending continued therapy due to pt winning appeal. Anticipate new DC date to be issued next week. Medications were not reconciled and general patient assessment was not completed during this skilled nursing facility outreach.

## 2019-12-20 ENCOUNTER — PATIENT OUTREACH (OUTPATIENT)
Dept: CASE MANAGEMENT | Age: 79
End: 2019-12-20

## 2019-12-23 ENCOUNTER — PATIENT OUTREACH (OUTPATIENT)
Dept: INTERNAL MEDICINE CLINIC | Age: 79
End: 2019-12-23

## 2019-12-23 NOTE — PROGRESS NOTES
Received notification patient discharged from [facility] [place] 12/16/2019. Outgoing call placed spoke to patient identified utilizing 2 identifiers. Introduced self and reason for the call. Patient verbalized understanding. Patient reports her care is being managed by Kendra Zacarias NP of [facility]. Patient states she is unable to leave her home d/t inability to fit in her wheelchair. Patient reports she cannot get larger chair because it would not fit the ramp. Patient states she is unable to see her cardiologist. Patient confirms At [address] is following for SN, PT and her  assist with ADL's. Patient states she has a [place] lift to assist with transfers. Will continue to monitor patient's progress.

## 2020-01-03 ENCOUNTER — PATIENT OUTREACH (OUTPATIENT)
Dept: INTERNAL MEDICINE CLINIC | Age: 80
End: 2020-01-03

## 2020-01-03 NOTE — PROGRESS NOTES
HF Bundle f/u call. Pt was previously managed by SANAZ Mike LPN while admitted to SNF. Pt is currently at home and being managed by [facility]. I contacted [facility] and confirmed they are seeing pt and she was last seen today by their team. Provided contact number for this CTN if any assistance is needed to help prevent readmission. Called At [facility] and confirmed that pt is opened to SN and PT. Home Health also assisting with wound care to RLE laceration that is weeping d/t blood thinners. CTN will f/u in one month.

## 2020-01-23 ENCOUNTER — PATIENT OUTREACH (OUTPATIENT)
Dept: INTERNAL MEDICINE CLINIC | Age: 80
End: 2020-01-23

## 2020-02-24 ENCOUNTER — PATIENT OUTREACH (OUTPATIENT)
Dept: INTERNAL MEDICINE CLINIC | Age: 80
End: 2020-02-24

## 2020-02-24 NOTE — PROGRESS NOTES
Patient has graduated from the Bundle program on 2/24/2020. Patient was not referred to the [facility] team for further management. Pt is managed by [employer] group.

## 2020-10-13 NOTE — ED NOTES
Sheets changed due to pt soiling. New wick placed in perineum. Lights dimmed for comfort. Pt given water per request. Pt updated on plan of care. [Dear  ___] : Dear  [unfilled], [Consult Letter:] : I had the pleasure of evaluating your patient, [unfilled]. [Please see my note below.] : Please see my note below. [Consult Closing:] : Thank you very much for allowing me to participate in the care of this patient.  If you have any questions, please do not hesitate to contact me.

## 2020-12-28 NOTE — ED TRIAGE NOTES
Patients shortness of breath for severe for 1 week. ED [facility] Monday night and she went to follow up appointment today and they sent her here for further evaluation. Patient denies chest pain. Legs are weeping. No

## 2021-08-03 PROBLEM — I48.91 ATRIAL FIBRILLATION (HCC): Status: RESOLVED | Noted: 2021-08-03 | Resolved: 2021-08-03

## 2022-10-13 NOTE — PROGRESS NOTES
This nurse to hold Coreg and Lasix at this time. In one hour this nurse to recheck BP to assess whether or not lasix and Coreg are appropriate at this time per Dr. Tonya Renae. Yes